# Patient Record
Sex: FEMALE | Race: BLACK OR AFRICAN AMERICAN | Employment: OTHER | URBAN - METROPOLITAN AREA
[De-identification: names, ages, dates, MRNs, and addresses within clinical notes are randomized per-mention and may not be internally consistent; named-entity substitution may affect disease eponyms.]

---

## 2017-01-23 RX ORDER — LEVOTHYROXINE SODIUM 25 UG/1
TABLET ORAL
Qty: 90 TAB | Refills: 1 | Status: SHIPPED | OUTPATIENT
Start: 2017-01-23 | End: 2017-07-28 | Stop reason: SDUPTHER

## 2017-02-14 ENCOUNTER — DOCUMENTATION ONLY (OUTPATIENT)
Dept: INTERNAL MEDICINE CLINIC | Age: 72
End: 2017-02-14

## 2017-02-14 NOTE — PROGRESS NOTES
Notification of ED visit from [facility] received. Patient seen at 2/10/17. Retrieved hospital notes from [facility] medical records portal and forwarded to BSD for review.

## 2017-02-15 ENCOUNTER — DOCUMENTATION ONLY (OUTPATIENT)
Dept: INTERNAL MEDICINE CLINIC | Age: 72
End: 2017-02-15

## 2017-02-15 NOTE — PROGRESS NOTES
Patient on the MSSP report dated 2/15/17. Admitted to [facility] 2/14/17 with AMS and aggressive behavior. Retrieved hospital [facility] from [facility] and forwarded to BSD for review.

## 2017-02-22 ENCOUNTER — TELEPHONE (OUTPATIENT)
Dept: INTERNAL MEDICINE CLINIC | Age: 72
End: 2017-02-22

## 2017-02-22 NOTE — TELEPHONE ENCOUNTER
Spoke with Mr. Chandrika Norman,    Patient was weaned off of her seizure medication. Shortly after, she had increased aggitation and ended up at [facility].  She had a seizure and stroke with right sided weakness while at [facility].  She is now back on her seizure meds and is following up with her neurologist at [facility].

## 2017-02-22 NOTE — TELEPHONE ENCOUNTER
----- Message from Won Rodriguez sent at 2/22/2017  1:01 PM EST -----  Regarding: Dr. Efrain Pino wanted to advise that his wife had a stroke a week ago, wanted to see if she needs to come in for a f/up.  Would like a callback on [phone]

## 2017-03-01 ENCOUNTER — DOCUMENTATION ONLY (OUTPATIENT)
Dept: INTERNAL MEDICINE CLINIC | Age: 72
End: 2017-03-01

## 2017-03-01 NOTE — PROGRESS NOTES
Retrieved hospital records from [facility] medical records portal and forwarded to BSD for review.

## 2017-03-10 PROBLEM — I63.9 CEREBROVASCULAR ACCIDENT (CVA) DUE TO THROMBOSIS (HCC): Status: ACTIVE | Noted: 2017-03-10

## 2017-04-20 ENCOUNTER — DOCUMENTATION ONLY (OUTPATIENT)
Dept: INTERNAL MEDICINE CLINIC | Age: 72
End: 2017-04-20

## 2017-04-20 NOTE — PROGRESS NOTES
Retrieved hospital notes from [facility] medical records portal and forwarded to BSD for review.

## 2017-05-02 NOTE — TELEPHONE ENCOUNTER
Patient needs a refill on generic Depakote 250mg, sig: take 3 tablest every 12 hours.  This medication was prescribed when she was admitted to [facility], please send script to          Pharmacy: [facility] [place], [address] [place]     Last OV:12/12/16  Next OV:06/05/17

## 2017-05-03 RX ORDER — DIVALPROEX SODIUM 250 MG/1
TABLET, DELAYED RELEASE ORAL
Refills: 2 | COMMUNITY
Start: 2017-04-05 | End: 2018-09-10 | Stop reason: SDUPTHER

## 2017-05-03 RX ORDER — DIVALPROEX SODIUM 250 MG/1
750 TABLET, DELAYED RELEASE ORAL EVERY 12 HOURS
Qty: 180 TAB | OUTPATIENT
Start: 2017-05-03

## 2017-05-03 NOTE — TELEPHONE ENCOUNTER
Last office visit - 12.12.16  Next office visit - 06.05.17      Requested Prescriptions     Pending Prescriptions Disp Refills    divalproex DR (DEPAKOTE) 250 mg tablet 180 Tab      Sig: Take 3 Tabs by mouth every twelve (12) hours.

## 2017-05-04 NOTE — TELEPHONE ENCOUNTER
Call to pt - pt was unable to identify herself and  Caretaker came onto the phone and advised pt was \"unable to talk like me & her\". She requested that I call back at 04:45pm and speak with the  Haylee Thompson. Will call back to request they contact her neurologist for further refills on this medication.

## 2017-05-05 NOTE — TELEPHONE ENCOUNTER
Call to Mr. Cyn Aguilar (/HIPPA) - advised that her Rx Depakote refills should be filled by her neurologist.  Cyn Aguilar acknowledged understanding and all questions were answered to patients satisfaction. No further questions or concerns at this time.

## 2017-06-05 ENCOUNTER — OFFICE VISIT (OUTPATIENT)
Dept: INTERNAL MEDICINE CLINIC | Age: 72
End: 2017-06-05

## 2017-06-05 VITALS
OXYGEN SATURATION: 97 % | BODY MASS INDEX: 22.48 KG/M2 | DIASTOLIC BLOOD PRESSURE: 62 MMHG | HEART RATE: 65 BPM | WEIGHT: 157 LBS | RESPIRATION RATE: 14 BRPM | SYSTOLIC BLOOD PRESSURE: 108 MMHG | TEMPERATURE: 98 F | HEIGHT: 70 IN

## 2017-06-05 DIAGNOSIS — Z79.899 ENCOUNTER FOR LONG-TERM (CURRENT) USE OF MEDICATIONS: ICD-10-CM

## 2017-06-05 DIAGNOSIS — E03.9 HYPOTHYROIDISM, ADULT: Primary | ICD-10-CM

## 2017-06-05 DIAGNOSIS — E78.2 MIXED HYPERLIPIDEMIA: ICD-10-CM

## 2017-06-05 DIAGNOSIS — G40.909 SEIZURE DISORDER (HCC): ICD-10-CM

## 2017-06-05 NOTE — PROGRESS NOTES
1. Have you been to the ER, urgent care clinic since your last visit? Hospitalized since your last visit? 3/1/2017 [facility] for ICU admit x1 week. 2. Have you seen or consulted any other health care providers outside of the [facility] since your last visit? Include any pap smears or colon screening. See above. Chief Complaint   Patient presents with    Hypothyroidism     6 month follow up    Depression     6 month follow up    Mass     Mass found on left kidney     Not fasting.

## 2017-06-05 NOTE — PROGRESS NOTES
Subjective:      Kyle Elam is a 73 y.o. female who presents today for follow up of her hypothyroid. Since her last visit, she had another stroke in 3/2017. Labs done at [facility] in 3/2017 - Tsh in normal range. She is getting OT and speech due to right arm weakness from last CVA. Her expressive dysphagia has also significantly worsened. She is still seeing her psychiatrist at [facility]. Her neurologist did a follow up  MRI of her brain and it did not show a mass that was initially thought with previous scans. She does have a mass on her left kidney that is being followed by urology at [facility]. She is going to have another CT of her kidneys in October, 2017. 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 90 Tab 1    atorvastatin (LIPITOR) 20 mg tablet Take 0.5 Tabs by mouth nightly. 30 Tab 5    sertraline (ZOLOFT) 100 mg tablet Take 150 mg by mouth daily.       clopidogrel (PLAVIX) 75 mg tablet Take 1 Tab by mouth daily. 30 Tab 1    Calcium Carbonate-Vit D3-Min (CALTRATE 600+D PLUS MINERALS) 600 mg calcium- 400 unit tab Take 1 Tab by mouth daily.  fish oil-omega-3 fatty acids 340-1,000 mg capsule Take 1 Cap by mouth daily. Review of Systems    Pertinent items are noted in HPI. Objective:     Visit Vitals    /62 (BP 1 Location: Left arm, BP Patient Position: Sitting)    Pulse 65    Temp 98 °F (36.7 °C) (Oral)    Resp 14    Ht 5' 10\" (1.778 m)    Wt 157 lb (71.2 kg)    SpO2 97%    BMI 22.53 kg/m2     General appearance: alert, cooperative, no distress, appears stated age, dyarthric  Head: Normocephalic, without obvious abnormality, atraumatic  Lungs: clear to auscultation bilaterally  Heart: regular rate and rhythm, S1, S2 normal, no murmur, click, rub or gallop    Assessment/Plan:     1. Hypothyroidism, adult  -last TSH done in 3/2017 (see scanned)  -continue levothyroxine 25mcg daily    2. Mixed hyperlipidemia  -dose of lipitor was increased from 10mg daily to 20mg daily in 3/2017 during her hospitalization for her CVA    3. Seizure disorder [address] [address]  -now on depakote. Levels and seizure followed closely by neurologist at [facility]    4. Encounter for long-term (current) use of medications    5. Left renal mass  -will be getting another renal imaging study done in October, 2017.   -followed by urology at [facility]    No orders of the defined types were placed in this encounter. Follow-up Disposition:     Follow up in 6 months     Return if symptoms worsen or fail to improve. Advised patient to call back or return to office if symptoms worsen/change/persist.     Discussed expected course/resolution/complications of diagnosis in detail with patient. Medication risks/benefits/costs/interactions/alternatives discussed with patient. Patient was given an after visit summary which includes diagnoses, current medications, & vitals.    Patient expressed understanding with the diagnosis and plan.

## 2017-06-05 NOTE — MR AVS SNAPSHOT
Visit Information Date & Time Provider Department Dept. Phone Encounter #  
 6/5/2017 10:40 AM Hannah Moore MD [place] [address] [facility] [phone] Follow-up Instructions Return in about 6 months (around 12/5/2017) for hypothyroid. Upcoming Health Maintenance Date Due DTaP/Tdap/Td series (1 - Tdap) 10/7/1968 ZOSTER VACCINE AGE 60> 10/7/2007 OSTEOPOROSIS SCREENING (DEXA) 10/7/2012 GLAUCOMA SCREENING Q2Y 10/7/2016 MEDICARE YEARLY EXAM 6/14/2017 INFLUENZA AGE 9 TO ADULT 8/1/2017 BREAST CANCER SCRN MAMMOGRAM 7/1/2018 COLONOSCOPY 1/1/2019 Allergies as of 6/5/2017  Review Complete On: 12/12/2016 By: Hannah Moore MD  
  
 Severity Noted Reaction Type Reactions Rofecoxib Medium 12/12/2016    Hives, Rash Other reaction(s): Hives, Hives Aleve [Naproxen Sodium]  12/05/2011   Side Effect Nausea and Vomiting Motrin [Ibuprofen]  12/05/2011   Side Effect Nausea and Vomiting Percocet [Oxycodone-acetaminophen]  12/05/2011   Side Effect Other (comments) Other reaction(s): Hallucinations (finding) 
hallucinations Current Immunizations  Reviewed on 12/12/2016 Name Date Influenza High Dose Vaccine PF 12/12/2016 Influenza Vaccine Intradermal PF 1/11/2013 Influenza Vaccine Whole 10/6/2009 Pneumococcal Polysaccharide (PPSV-23) 7/30/2013  8:45 AM  
  
 Not reviewed this visit Vitals BP Pulse Temp Resp Height(growth percentile) Weight(growth percentile) 108/62 (BP 1 Location: Left arm, BP Patient Position: Sitting) 65 98 °F (36.7 °C) (Oral) 14 5' 10\" (1.778 m) 157 lb (71.2 kg) SpO2 BMI OB Status Smoking Status 97% 22.53 kg/m2 Postmenopausal Never Smoker Vitals History BMI and BSA Data Body Mass Index Body Surface Area  
 22.53 kg/m 2 1.88 m 2 Preferred Pharmacy Pharmacy Name Phone [id number], [address] [address] [address] [phone] Your Updated Medication List  
  
   
This list is accurate as of: 6/5/17 11:23 AM.  Always use your most recent med list.  
  
  
  
  
 atorvastatin 20 mg tablet Commonly known as:  LIPITOR Take 0.5 Tabs by mouth nightly. CALTRATE 600+D PLUS MINERALS 600 mg calcium- 400 unit Tab Generic drug:  Calcium Carbonate-Vit D3-Min Take 1 Tab by mouth daily. clopidogrel 75 mg Tab Commonly known as:  PLAVIX Take 1 Tab by mouth daily. divalproex  mg tablet Commonly known as:  DEPAKOTE  
TAKE 3 TABLETS BY MOUTH EVERY 12 HOURS  
  
 fish oil-omega-3 fatty acids 340-1,000 mg capsule Take 1 Cap by mouth daily. levothyroxine 25 mcg tablet Commonly known as:  SYNTHROID  
TAKE 1 TAB BY MOUTH DAILY (BEFORE BREAKFAST). sertraline 100 mg tablet Commonly known as:  ZOLOFT Take 150 mg by mouth daily. Follow-up Instructions Return in about 6 months (around 12/5/2017) for hypothyroid. To-Do List   
 07/07/2017 11:00 AM  
  Appointment with [facility] DAV[facility] 1 at [address] [place] [address] ([phone]) Shower or bathe using soap and water. Do not use deodorant, powder, perfumes, or lotion the day of your exam.  If your prior mammograms were not performed at [facility] 6 please bring films with you or forward prior images 2 days before your procedure. Check in at registration 15min before your appointment time unless you were instructed to do otherwise. A script is not necessary, but if you have one, please bring it on the day of the mammogram or have it faxed to the department. [facility] [phone] [facility]  [phone] [facility] [address]  [phone] [facility] [phone] [place] [address] Kimber Gowers [phone] Introducing [facility]! [facility] introduces [employer] patient portal. Now you can access parts of your medical record, email your doctor's office, and request medication refills online.    
 
1. In your internet browser, go to [email or web address] 2. Click on the First Time User? Click Here link in the Sign In box. You will see the New Member Sign Up page. 3. Enter your 39 [employer] Access Code exactly as it appears below. You will not need to use this code after youve completed the sign-up process. If you do not sign up before the expiration date, you must request a new code. · 39 [employer] Access Code: [email or web address] Expires: 8/29/2017  9:52 AM 
 
4. Enter the last four digits of your Social Security Number (xxxx) and Date of Birth (mm/dd/yyyy) as indicated and click Submit. You will be taken to the next sign-up page. 5. Create a 39 Health ID. This will be your 39 Health login ID and cannot be changed, so think of one that is secure and easy to remember. 6. Create a 39 Health password. You can change your password at any time. 7. Enter your Password Reset Question and Answer. This can be used at a later time if you forget your password. 8. Enter your e-mail address. You will receive e-mail notification when new information is available in [address] 9. Click Sign Up. You can now view and download portions of your medical record. 10. Click the Download Summary menu link to download a portable copy of your medical information. If you have questions, please visit the Frequently Asked Questions section of the 39 [employer] website. Remember, 39 [employer] is NOT to be used for urgent needs. For medical emergencies, dial 911. Now available from your iPhone and Android! Please provide this summary of care documentation to your next provider. Your primary care clinician is listed as Nubia Lopez. If you have any questions after today's visit, please call [phone].

## 2017-07-07 ENCOUNTER — HOSPITAL ENCOUNTER (OUTPATIENT)
Dept: MAMMOGRAPHY | Age: 72
Discharge: HOME | End: 2017-07-07
Attending: INTERNAL MEDICINE
Payer: MEDICARE

## 2017-07-07 DIAGNOSIS — Z12.31 VISIT FOR SCREENING MAMMOGRAM: ICD-10-CM

## 2017-07-07 PROCEDURE — 77067 SCR MAMMO BI INCL CAD: CPT

## 2017-07-28 RX ORDER — LEVOTHYROXINE SODIUM 25 UG/1
TABLET ORAL
Qty: 90 TAB | Refills: 1 | Status: SHIPPED | OUTPATIENT
Start: 2017-07-28 | End: 2017-12-12 | Stop reason: DRUGHIGH

## 2017-09-11 ENCOUNTER — OFFICE VISIT (OUTPATIENT)
Dept: INTERNAL MEDICINE CLINIC | Age: 72
End: 2017-09-11

## 2017-09-11 VITALS
RESPIRATION RATE: 18 BRPM | HEART RATE: 76 BPM | WEIGHT: 160 LBS | BODY MASS INDEX: 22.9 KG/M2 | HEIGHT: 70 IN | OXYGEN SATURATION: 98 % | SYSTOLIC BLOOD PRESSURE: 100 MMHG | DIASTOLIC BLOOD PRESSURE: 58 MMHG | TEMPERATURE: 98.5 F

## 2017-09-11 DIAGNOSIS — Z00.00 INITIAL MEDICARE ANNUAL WELLNESS VISIT: Primary | ICD-10-CM

## 2017-09-11 NOTE — PROGRESS NOTES
Chief Complaint   Patient presents with   [facility] Annual Wellness Visit        1. Have you been to the ER, urgent care clinic since your last visit? No  Hospitalized since your last visit? No    2. Have you seen or consulted any other health care providers outside of the [address] [place] [address] since your last visit? No  Include any pap smears or colon screening.  No

## 2017-09-11 NOTE — PATIENT INSTRUCTIONS

## 2017-09-29 ENCOUNTER — TELEPHONE (OUTPATIENT)
Dept: INTERNAL MEDICINE CLINIC | Age: 72
End: 2017-09-29

## 2017-09-29 DIAGNOSIS — R47.1 DYSARTHRIA: ICD-10-CM

## 2017-09-29 DIAGNOSIS — R29.898 RIGHT HAND WEAKNESS: ICD-10-CM

## 2017-09-29 DIAGNOSIS — Z86.73 HISTORY OF STROKE: Primary | ICD-10-CM

## 2017-10-02 NOTE — TELEPHONE ENCOUNTER
Orders fax to [address] [place] [address] per pt request. Confirmation received. No further action needed.

## 2017-12-11 ENCOUNTER — HOSPITAL ENCOUNTER (OUTPATIENT)
Dept: LAB | Age: 72
Discharge: HOME | End: 2017-12-11
Payer: MEDICARE

## 2017-12-11 ENCOUNTER — OFFICE VISIT (OUTPATIENT)
Dept: INTERNAL MEDICINE CLINIC | Age: 72
End: 2017-12-11

## 2017-12-11 VITALS
DIASTOLIC BLOOD PRESSURE: 68 MMHG | HEIGHT: 70 IN | TEMPERATURE: 97.4 F | HEART RATE: 67 BPM | RESPIRATION RATE: 14 BRPM | WEIGHT: 162 LBS | SYSTOLIC BLOOD PRESSURE: 122 MMHG | OXYGEN SATURATION: 96 % | BODY MASS INDEX: 23.19 KG/M2

## 2017-12-11 DIAGNOSIS — E78.2 MIXED HYPERLIPIDEMIA: ICD-10-CM

## 2017-12-11 DIAGNOSIS — Z79.899 ENCOUNTER FOR LONG-TERM (CURRENT) USE OF MEDICATIONS: ICD-10-CM

## 2017-12-11 DIAGNOSIS — R73.09 ELEVATED GLUCOSE: ICD-10-CM

## 2017-12-11 DIAGNOSIS — R29.898 RIGHT HAND WEAKNESS: ICD-10-CM

## 2017-12-11 DIAGNOSIS — E03.9 HYPOTHYROIDISM, ADULT: Primary | ICD-10-CM

## 2017-12-11 PROCEDURE — 84439 ASSAY OF FREE THYROXINE: CPT

## 2017-12-11 PROCEDURE — 84443 ASSAY THYROID STIM HORMONE: CPT

## 2017-12-11 PROCEDURE — 80053 COMPREHEN METABOLIC PANEL: CPT

## 2017-12-11 PROCEDURE — 36415 COLL VENOUS BLD VENIPUNCTURE: CPT

## 2017-12-11 PROCEDURE — 85025 COMPLETE CBC W/AUTO DIFF WBC: CPT

## 2017-12-11 PROCEDURE — 80061 LIPID PANEL: CPT

## 2017-12-11 PROCEDURE — 83036 HEMOGLOBIN GLYCOSYLATED A1C: CPT

## 2017-12-11 RX ORDER — LEVOTHYROXINE SODIUM 25 UG/1
TABLET ORAL
COMMUNITY
Start: 2017-03-01 | End: 2017-12-11 | Stop reason: SDUPTHER

## 2017-12-11 RX ORDER — CLOPIDOGREL BISULFATE 75 MG/1
TABLET ORAL
COMMUNITY
Start: 2017-05-15 | End: 2017-12-11 | Stop reason: SDUPTHER

## 2017-12-11 RX ORDER — SERTRALINE HYDROCHLORIDE 100 MG/1
TABLET, FILM COATED ORAL
COMMUNITY
Start: 2017-05-01 | End: 2017-12-11 | Stop reason: SDUPTHER

## 2017-12-11 RX ORDER — GLUCOSAM/CHONDRO/HERB 149/HYAL 750-100 MG
TABLET ORAL
COMMUNITY
Start: 2017-03-01 | End: 2017-12-11

## 2017-12-11 NOTE — PROGRESS NOTES
Patient's identity verified with two patient identifiers (name and date of birth). 1. Have you been to the ER, urgent care clinic since your last visit? Hospitalized since your last visit? No  2. Have you seen or consulted any other health care providers outside of the [address] [place] [address] since your last visit? Include any pap smears or colon screening. No    Chief Complaint   Patient presents with    Thyroid Problem     6 month follow up    Cholesterol Problem     6 month follow up    Hand Swelling     Right hand, swelling has decreased, but still no movement. Would like to discuss- needs to see [facility]oped[facility], referral?      Not fasting. Health Maintenance Due   Topic Date Due    ZOSTER VACCINE AGE 60>  08/07/2007    GLAUCOMA SCREENING Q2Y  10/07/2016     Reviewed record in preparation for visit and have obtained necessary documentation. Wt Readings from Last 3 Encounters:   12/11/17 162 lb (73.5 kg)   09/11/17 160 lb (72.6 kg)   06/05/17 157 lb (71.2 kg)     Temp Readings from Last 3 Encounters:   12/11/17 97.4 °F (36.3 °C) (Oral)   09/11/17 98.5 °F (36.9 °C) (Oral)   06/05/17 98 °F (36.7 °C) (Oral)     BP Readings from Last 3 Encounters:   12/11/17 122/68   09/11/17 100/58   06/05/17 108/62     Pulse Readings from Last 3 Encounters:   12/11/17 67   09/11/17 76   06/05/17 65     Patient is accompanied by  I have received verbal consent from Adam Nair to discuss any/all medical information while they are present in the room.

## 2017-12-11 NOTE — MR AVS SNAPSHOT
Visit Information Date & Time Provider Department Dept. Phone Encounter #  
 12/11/2017 11:20 AM Suzan Medina MD [place] [id number] [facility] [phone] Follow-up Instructions Return in about 6 months (around 6/11/2018) for hypothyroid. Upcoming Health Maintenance Date Due  
 GLAUCOMA SCREENING Q2Y 10/7/2016 ZOSTER VACCINE AGE 60> 3/1/2018* MEDICARE YEARLY EXAM 9/12/2018 COLONOSCOPY 1/1/2019 BREAST CANCER SCRN MAMMOGRAM 7/7/2019 DTaP/Tdap/Td series (2 - Td) 9/11/2027 *Topic was postponed. The date shown is not the original due date. Allergies as of 12/11/2017  Review Complete On: 12/11/2017 By: Suzan Medina MD  
  
 Severity Noted Reaction Type Reactions Rofecoxib Medium 12/12/2016    Hives, Rash Other reaction(s): Hives, Hives Aleve [Naproxen Sodium]  12/05/2011   Side Effect Nausea and Vomiting Motrin [Ibuprofen]  12/05/2011   Side Effect Nausea and Vomiting Percocet [Oxycodone-acetaminophen]  12/05/2011   Side Effect Other (comments) Other reaction(s): Hallucinations (finding) 
hallucinations Current Immunizations  Reviewed on 12/11/2017 Name Date Influenza High Dose Vaccine PF 12/12/2016 Influenza Vaccine Intradermal PF 1/11/2013 Influenza Vaccine Whole 10/6/2009 Pneumococcal Conjugate (PCV-13) 6/20/2016 Pneumococcal Polysaccharide (PPSV-23) 7/30/2013  8:45 AM  
  
 Reviewed by Suzan Medina MD on 12/11/2017 at 11:55 AM  
You Were Diagnosed With   
  
 Codes Comments Hypothyroidism, adult    -  Primary ICD-10-CM: E03.9 ICD-9-CM: 244.9 Mixed hyperlipidemia     ICD-10-CM: E78.2 ICD-9-CM: 272.2 Right hand weakness     ICD-10-CM: R29.898 ICD-9-CM: 728.87 Elevated glucose     ICD-10-CM: R73.09 
ICD-9-CM: 790.29 Encounter for long-term (current) use of medications     ICD-10-CM: Z79.899 ICD-9-CM: V58.69 Vitals BP Pulse Temp Resp Height(growth percentile) Weight(growth percentile) 122/68 (BP 1 Location: Left arm, BP Patient Position: Sitting) 67 97.4 °F (36.3 °C) (Oral) 14 5' 10\" (1.778 m) 162 lb (73.5 kg) SpO2 BMI OB Status Smoking Status 96% 23.24 kg/m2 Postmenopausal Never Smoker Vitals History BMI and BSA Data Body Mass Index Body Surface Area  
 23.24 kg/m 2 1.91 m 2 Preferred Pharmacy Pharmacy Name Phone [id number], [address] [place] [address] [phone] Your Updated Medication List  
  
   
This list is accurate as of: 12/11/17 12:24 PM.  Always use your most recent med list.  
  
  
  
  
 atorvastatin 20 mg tablet Commonly known as:  LIPITOR  
TAKE 1 TABLET BY MOUTH EVERY DAY  
  
 CALTRATE 600+D PLUS MINERALS 600 mg calcium- 400 unit Tab Generic drug:  Calcium Carbonate-Vit D3-Min Take 1 Tab by mouth daily. clopidogrel 75 mg Tab Commonly known as:  PLAVIX Take 1 Tab by mouth daily. divalproex  mg tablet Commonly known as:  DEPAKOTE  
TAKE 3 TABLETS BY MOUTH EVERY 12 HOURS  
  
 fish oil-omega-3 fatty acids 340-1,000 mg capsule Take 1 Cap by mouth daily. levothyroxine 25 mcg tablet Commonly known as:  SYNTHROID  
TAKE 1 TAB BY MOUTH DAILY (BEFORE BREAKFAST). sertraline 100 mg tablet Commonly known as:  ZOLOFT Take 150 mg by mouth daily. We Performed the Following CBC WITH AUTOMATED DIFF [47757 CPT(R)] HEMOGLOBIN A1C WITH EAG [62470 CPT(R)] LIPID PANEL [70239 CPT(R)] METABOLIC PANEL, COMPREHENSIVE [52754 CPT(R)] T4, FREE A847768 CPT(R)] TSH 3RD GENERATION [20742 CPT(R)] Follow-up Instructions Return in about 6 months (around 6/11/2018) for hypothyroid. Introducing [facility]! [facility] introduces eBrisk Video patient portal. Now you can access parts of your medical record, email your doctor's office, and request medication refills online.    
 
1. In your internet browser, go to [email or web address] 2. Click on the First Time User? Click Here link in the Sign In box. You will see the New Member Sign Up page. 3. Enter your Hotalot Access Code exactly as it appears below. You will not need to use this code after youve completed the sign-up process. If you do not sign up before the expiration date, you must request a new code. · Hotalot Access Code: [email or web address] Expires: 3/11/2018 12:24 PM 
 
4. Enter the last four digits of your Social Security Number (xxxx) and Date of Birth (mm/dd/yyyy) as indicated and click Submit. You will be taken to the next sign-up page. 5. Create a Freight Connectiont ID. This will be your Hotalot login ID and cannot be changed, so think of one that is secure and easy to remember. 6. Create a Hotalot password. You can change your password at any time. 7. Enter your Password Reset Question and Answer. This can be used at a later time if you forget your password. 8. Enter your e-mail address. You will receive e-mail notification when new information is available in [address] 9. Click Sign Up. You can now view and download portions of your medical record. 10. Click the Download Summary menu link to download a portable copy of your medical information. If you have questions, please visit the Frequently Asked Questions section of the Hotalot website. Remember, Hotalot is NOT to be used for urgent needs. For medical emergencies, dial 911. Now available from your iPhone and Android! Please provide this summary of care documentation to your next provider. Your primary care clinician is listed as Nubia Lopez. If you have any questions after today's visit, please call [phone].

## 2017-12-11 NOTE — PROGRESS NOTES
Subjective:      Chelle Veloz is a 81 y.o. female who presents today for follow up of her hypothyroid and hyperlipidemia. She is here with her . Since her last CVA, in 3/2017, she has had significant weakness and swelling in her right hand. She has worked with PT and OT. The swelling in her hand has resolved, but the mobility is still quite limited. Her dysarthria also worsened after this second stroke. She has also not regained much with speech therapy. Her  has hired someone to come twice weekly to help her with her speech and her mobility. She has been discharged by OT and PT.  OT suggested perhaps she can talk with orthopedics to see if they can do anything else with her hand. She is right handed. Patient is tearful today. Frustrated about not making progress with her speech and hand movement. She follows with neurology and psychiatry at [facility].     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    S/P arthroscopy of shoulder Z98.890    Colon cancer screening Z12.11    History of screening mammography Z92.89    Pap smear for cervical cancer screening Z12.4    S/P left cataract extraction Z98.42    Vitamin D deficiency E55.9    CVA (cerebral infarction) I63.9    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    Pseudobulbar affect F48.2    Right arm weakness R29.898    Seizure ([address] R56.9    Toxoplasma chorioretinitis, 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 90 Tab 1    divalproex DR (DEPAKOTE) 250 mg tablet TAKE 3 TABLETS BY MOUTH EVERY 12 HOURS  2    sertraline (ZOLOFT) 100 mg tablet Take 150 mg by mouth daily.  clopidogrel (PLAVIX) 75 mg tablet Take 1 Tab by mouth daily. 30 Tab 1    fish oil-omega-3 fatty acids 340-1,000 mg capsule Take 1 Cap by mouth daily.  Calcium Carbonate-Vit D3-Min (CALTRATE 600+D PLUS MINERALS) 600 mg calcium- 400 unit tab Take 1 Tab by mouth daily. Review of Systems    Pertinent items are noted in HPI. Objective:     Visit Vitals    /68 (BP 1 Location: Left arm, BP Patient Position: Sitting)    Pulse 67    Temp 97.4 °F (36.3 °C) (Oral)    Resp 14    Ht 5' 10\" (1.778 m)    Wt 162 lb (73.5 kg)    SpO2 96%    BMI 23.24 kg/m2     General appearance: alert, cooperative, no distress, appears stated age  Head: Normocephalic, without obvious abnormality, atraumatic  Neck: supple, symmetrical, trachea midline, no adenopathy, thyroid: not enlarged, symmetric, no tenderness/mass/nodules, no carotid bruit and no JVD  Lungs: clear to auscultation bilaterally  Heart: regular rate and rhythm, S1, S2 normal, no murmur, click, rub or gallop    Assessment/Plan:     1. Hypothyroidism, adult  - clinically euthyroid. Due for labs at this time.     - TSH 3RD GENERATION  - T4, FREE    2. Mixed hyperlipidemia  -taking statin with history of CVA. -due for fasting lipid panel at this time. - CBC WITH AUTOMATED DIFF  - METABOLIC PANEL, COMPREHENSIVE  - LIPID PANEL    3. Right hand weakness  -referred to ortho at request of patient. - Dr. Yung Sousa    4. Elevated glucose  -noted to have elevated glucose in past labs. Will recheck at this time.     - HEMOGLOBIN A1C WITH EAG    5.  Encounter for long-term (current) use of medications    Orders Placed This Encounter    CBC WITH AUTOMATED DIFF    METABOLIC PANEL, COMPREHENSIVE    LIPID PANEL    TSH 3RD GENERATION    T4, FREE    HEMOGLOBIN A1C WITH EAG    REFERRAL TO ORTHOPEDIC SURGERY     Referral Priority:   Routine     Referral Type:   Consultation     Referral Reason:   Specialty Services Required     Referred to Provider:   Sam Barrett MD     Requested Specialty:   Orthopedic Surgery     Follow-up Disposition:     Follow up in 6 months     Return if symptoms worsen or fail to improve. Advised patient to call back or return to office if symptoms worsen/change/persist.     Discussed expected course/resolution/complications of diagnosis in detail with patient. Medication risks/benefits/costs/interactions/alternatives discussed with patient. Patient was given an after visit summary which includes diagnoses, current medications, & vitals. Patient expressed understanding with the diagnosis and plan.

## 2017-12-12 ENCOUNTER — TELEPHONE (OUTPATIENT)
Dept: INTERNAL MEDICINE CLINIC | Age: 72
End: 2017-12-12

## 2017-12-12 LAB
ALBUMIN SERPL-MCNC: 4.3 G/DL (ref 3.5–4.8)
ALBUMIN/GLOB SERPL: 1.5 {RATIO} (ref 1.2–2.2)
ALP SERPL-CCNC: 53 IU/L (ref 39–117)
ALT SERPL-CCNC: 5 IU/L (ref 0–32)
AST SERPL-CCNC: 12 IU/L (ref 0–40)
BASOPHILS # BLD AUTO: 0 X10E3/UL (ref 0–0.2)
BASOPHILS NFR BLD AUTO: 0 %
BILIRUB SERPL-MCNC: 0.3 MG/DL (ref 0–1.2)
BUN SERPL-MCNC: 21 MG/DL (ref 8–27)
BUN/CREAT SERPL: 21 (ref 12–28)
CALCIUM SERPL-MCNC: 9.3 MG/DL (ref 8.7–10.3)
CHLORIDE SERPL-SCNC: 102 MMOL/L (ref 96–106)
CHOLEST SERPL-MCNC: 119 MG/DL (ref 100–199)
CO2 SERPL-SCNC: 25 MMOL/L (ref 18–29)
CREAT SERPL-MCNC: 1.02 MG/DL (ref 0.57–1)
EOSINOPHIL # BLD AUTO: 0 X10E3/UL (ref 0–0.4)
EOSINOPHIL NFR BLD AUTO: 0 %
ERYTHROCYTE [DISTWIDTH] IN BLOOD BY AUTOMATED COUNT: 15.4 % (ref 12.3–15.4)
EST. AVERAGE GLUCOSE BLD GHB EST-MCNC: 120 MG/DL
GFR SERPLBLD CREATININE-BSD FMLA CKD-EPI: 56 ML/MIN/1.73
GFR SERPLBLD CREATININE-BSD FMLA CKD-EPI: 64 ML/MIN/1.73
GLOBULIN SER CALC-MCNC: 2.9 G/DL (ref 1.5–4.5)
GLUCOSE SERPL-MCNC: 106 MG/DL (ref 65–99)
HBA1C MFR BLD: 5.8 % (ref 4.8–5.6)
HCT VFR BLD AUTO: 36.4 % (ref 34–46.6)
HDLC SERPL-MCNC: 59 MG/DL
HGB BLD-MCNC: 12.2 G/DL (ref 11.1–15.9)
IMM GRANULOCYTES # BLD: 0 X10E3/UL (ref 0–0.1)
IMM GRANULOCYTES NFR BLD: 0 %
INTERPRETATION, 910389: NORMAL
INTERPRETATION: NORMAL
LDLC SERPL CALC-MCNC: 46 MG/DL (ref 0–99)
LYMPHOCYTES # BLD AUTO: 1.4 X10E3/UL (ref 0.7–3.1)
LYMPHOCYTES NFR BLD AUTO: 27 %
MCH RBC QN AUTO: 28.6 PG (ref 26.6–33)
MCHC RBC AUTO-ENTMCNC: 33.5 G/DL (ref 31.5–35.7)
MCV RBC AUTO: 85 FL (ref 79–97)
MONOCYTES # BLD AUTO: 0.6 X10E3/UL (ref 0.1–0.9)
MONOCYTES NFR BLD AUTO: 12 %
NEUTROPHILS # BLD AUTO: 3.2 X10E3/UL (ref 1.4–7)
NEUTROPHILS NFR BLD AUTO: 61 %
PDF IMAGE, 910387: NORMAL
PLATELET # BLD AUTO: 129 X10E3/UL (ref 150–379)
POTASSIUM SERPL-SCNC: 4.3 MMOL/L (ref 3.5–5.2)
PROT SERPL-MCNC: 7.2 G/DL (ref 6–8.5)
RBC # BLD AUTO: 4.27 X10E6/UL (ref 3.77–5.28)
SODIUM SERPL-SCNC: 141 MMOL/L (ref 134–144)
T4 FREE SERPL-MCNC: 1.03 NG/DL (ref 0.82–1.77)
TRIGL SERPL-MCNC: 68 MG/DL (ref 0–149)
TSH SERPL DL<=0.005 MIU/L-ACNC: 5.56 UIU/ML (ref 0.45–4.5)
VLDLC SERPL CALC-MCNC: 14 MG/DL (ref 5–40)
WBC # BLD AUTO: 5.2 X10E3/UL (ref 3.4–10.8)

## 2017-12-12 RX ORDER — LEVOTHYROXINE SODIUM 50 UG/1
50 TABLET ORAL
Qty: 90 TAB | Refills: 1 | Status: SHIPPED | OUTPATIENT
Start: 2017-12-12 | End: 2018-06-09 | Stop reason: SDUPTHER

## 2017-12-12 NOTE — PROGRESS NOTES
See telephone note 12/12/2017. Increase levothyroxine to 50mcg daily.   Recheck TSH at her next visit

## 2017-12-12 NOTE — TELEPHONE ENCOUNTER
Discussed with her  that her TSH is elevated on her current dose of levothyroxine of 25mcg daily. Plan - increase to levothyroxine 50mcg daily . Recheck her TSH at her next visit.

## 2018-03-12 RX ORDER — ATORVASTATIN CALCIUM 20 MG/1
TABLET, FILM COATED ORAL
Qty: 90 TAB | Refills: 1 | Status: SHIPPED | OUTPATIENT
Start: 2018-03-12 | End: 2018-09-09 | Stop reason: SDUPTHER

## 2018-06-10 RX ORDER — LEVOTHYROXINE SODIUM 50 UG/1
TABLET ORAL
Qty: 90 TAB | Refills: 1 | Status: SHIPPED | OUTPATIENT
Start: 2018-06-10 | End: 2018-12-05 | Stop reason: SDUPTHER

## 2018-06-25 ENCOUNTER — TELEPHONE (OUTPATIENT)
Dept: INTERNAL MEDICINE CLINIC | Age: 73
End: 2018-06-25

## 2018-06-25 NOTE — TELEPHONE ENCOUNTER
Anyone in the Neuro practice at [facility] Loras Leyden is one in that group)

## 2018-06-25 NOTE — TELEPHONE ENCOUNTER
HIPAA verified. Spoke with patient's , two identifiers verified. Advised per DSE, NP of recommended Neuro office & provider. States he will look up this online. Advised to return call w/ questions/concerns. Patient's  verbalizes understanding.

## 2018-06-25 NOTE — TELEPHONE ENCOUNTER
states they are dissatisfied with the neurologist his wife is seeing at [facility], he would like Dr. Cristiana Nolasco to refer her to a neurologist in the [place] [address] (he was advised that Dr. Cristiana Nolasco is out of the office this week).

## 2018-07-06 ENCOUNTER — HOSPITAL ENCOUNTER (OUTPATIENT)
Dept: LAB | Age: 73
Discharge: HOME | End: 2018-07-06
Payer: MEDICARE

## 2018-07-06 ENCOUNTER — OFFICE VISIT (OUTPATIENT)
Dept: INTERNAL MEDICINE CLINIC | Age: 73
End: 2018-07-06

## 2018-07-06 VITALS
WEIGHT: 161 LBS | RESPIRATION RATE: 14 BRPM | DIASTOLIC BLOOD PRESSURE: 62 MMHG | TEMPERATURE: 97 F | BODY MASS INDEX: 23.05 KG/M2 | HEART RATE: 69 BPM | SYSTOLIC BLOOD PRESSURE: 124 MMHG | HEIGHT: 70 IN | OXYGEN SATURATION: 96 %

## 2018-07-06 DIAGNOSIS — I69.322 CVA, OLD, DYSARTHRIA: ICD-10-CM

## 2018-07-06 DIAGNOSIS — R10.13 DYSPEPSIA: ICD-10-CM

## 2018-07-06 DIAGNOSIS — E03.9 HYPOTHYROIDISM, ADULT: Primary | ICD-10-CM

## 2018-07-06 DIAGNOSIS — R29.898 RIGHT HAND WEAKNESS: ICD-10-CM

## 2018-07-06 DIAGNOSIS — R73.09 ELEVATED GLUCOSE: ICD-10-CM

## 2018-07-06 DIAGNOSIS — Z79.899 ENCOUNTER FOR LONG-TERM (CURRENT) USE OF MEDICATIONS: ICD-10-CM

## 2018-07-06 PROCEDURE — 84439 ASSAY OF FREE THYROXINE: CPT

## 2018-07-06 PROCEDURE — 36415 COLL VENOUS BLD VENIPUNCTURE: CPT

## 2018-07-06 PROCEDURE — 80053 COMPREHEN METABOLIC PANEL: CPT

## 2018-07-06 PROCEDURE — 83036 HEMOGLOBIN GLYCOSYLATED A1C: CPT

## 2018-07-06 PROCEDURE — 84443 ASSAY THYROID STIM HORMONE: CPT

## 2018-07-06 RX ORDER — SERTRALINE HYDROCHLORIDE 100 MG/1
TABLET, FILM COATED ORAL
COMMUNITY
Start: 2017-12-11 | End: 2018-07-06 | Stop reason: SDUPTHER

## 2018-07-06 RX ORDER — PNEUMOCOCCAL VACCINE POLYVALENT 25; 25; 25; 25; 25; 25; 25; 25; 25; 25; 25; 25; 25; 25; 25; 25; 25; 25; 25; 25; 25; 25; 25 UG/.5ML; UG/.5ML; UG/.5ML; UG/.5ML; UG/.5ML; UG/.5ML; UG/.5ML; UG/.5ML; UG/.5ML; UG/.5ML; UG/.5ML; UG/.5ML; UG/.5ML; UG/.5ML; UG/.5ML; UG/.5ML; UG/.5ML; UG/.5ML; UG/.5ML; UG/.5ML; UG/.5ML; UG/.5ML; UG/.5ML
INJECTION, SOLUTION INTRAMUSCULAR; SUBCUTANEOUS
Refills: 0 | COMMUNITY
Start: 2018-06-18 | End: 2018-07-06 | Stop reason: ALTCHOICE

## 2018-07-06 RX ORDER — GLUCOSAM/CHONDRO/HERB 149/HYAL 750-100 MG
TABLET ORAL
COMMUNITY
Start: 2017-03-01 | End: 2018-07-06 | Stop reason: SDUPTHER

## 2018-07-06 RX ORDER — LEVOTHYROXINE SODIUM 25 UG/1
TABLET ORAL
COMMUNITY
Start: 2017-03-01 | End: 2018-07-06 | Stop reason: SDUPTHER

## 2018-07-06 RX ORDER — CLOPIDOGREL BISULFATE 75 MG/1
TABLET ORAL
COMMUNITY
Start: 2017-07-14 | End: 2018-07-06 | Stop reason: SDUPTHER

## 2018-07-06 NOTE — PROGRESS NOTES
Subjective:      Madalyn Brody is a 81 y.o. female who presents today for follow up of her hypothyroid and elevated glucose. She is here with her  who is her primary care giver. She follows with neurology and psychiatry at [facility] for her hx of seizure, CVA and pseudobulbar affect. She brings today the exercises she does at home for the stiffness and loss of function of her right hand which occurred with her last stroke in 3/2017. She is working hard with her dysarthria. She is using 4th grade work books to work on reading comprehension and also her verbal skills. She has some left chest discomfort when she eats peppers. Otherwise, her appetite is good. She is also walking daily for 15 minutes to help her balance. 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    S/P arthroscopy of shoulder Z98.890    Colon cancer screening Z12.11    History of screening mammography Z92.89    Pap smear for cervical cancer screening Z12.4    S/P left cataract extraction Z98.42    Vitamin D deficiency E55.9    CVA (cerebral infarction) I63.9    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    Pseudobulbar affect F48.2    Right arm weakness R29.898    Seizure ([address] R56.9    Toxoplasma chorioretinitis, 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 90 Tab 1    divalproex DR (DEPAKOTE) 250 mg tablet TAKE 3 TABLETS BY MOUTH EVERY 12 HOURS  2    sertraline (ZOLOFT) 100 mg tablet Take 150 mg by mouth daily.       clopidogrel (PLAVIX) 75 mg tablet Take 1 Tab by mouth daily. 30 Tab 1    fish oil-omega-3 fatty acids 340-1,000 mg capsule Take 1 Cap by mouth daily.  Calcium Carbonate-Vit D3-Min (CALTRATE 600+D PLUS MINERALS) 600 mg calcium- 400 unit tab Take 1 Tab by mouth daily.  atorvastatin (LIPITOR) 20 mg tablet TAKE 1 TABLET BY MOUTH EVERY DAY 90 Tab 1        Review of Systems    Pertinent items are noted in HPI. Objective:     Visit Vitals    /62 (BP 1 Location: Left arm, BP Patient Position: Sitting)    Pulse 69    Temp 97 °F (36.1 °C) (Oral)    Resp 14    Ht 5' 10\" (1.778 m)    Wt 161 lb (73 kg)    SpO2 96%    BMI 23.1 kg/m2     General appearance: alert, cooperative, no distress, appears stated age  Head: Normocephalic, without obvious abnormality, atraumatic  Neck: supple, symmetrical, trachea midline, no adenopathy, thyroid: not enlarged, symmetric, no tenderness/mass/nodules, no carotid bruit and no JVD  Lungs: clear to auscultation bilaterally  Heart: regular rate and rhythm, S1, S2 normal, no murmur, click, rub or gallop  Extremities: no edema. Right hand- fingers slightly contracted. Patient able to move her hand at wrist slightly and slight movements of her fingers. Pulses: 2+ and symmetric    Assessment/Plan:     1. Hypothyroidism, adult  -appears clinically euthyroid  -check labs at this time.     - METABOLIC PANEL, COMPREHENSIVE  - TSH 3RD GENERATION  - T4, FREE    2. Elevated glucose  -continue low sugar and carbohydrate diet. - METABOLIC PANEL, COMPREHENSIVE  - HEMOGLOBIN A1C WITH EAG    3. Encounter for long-term (current) use of medications      4. CVA, old, dysarthria  -patient studying on her own for her dysarthria. May benefit from speech therapy.     - REFERRAL TO PHYSICAL THERAPY    5. Right hand weakness  -has been working hard at doing her exercises on her own. Will consult PT to advance her exercises. - REFERRAL TO PHYSICAL THERAPY    6. Dyspepsia  -occurs only with consumption of peppers.   Patient to avoid this food. Orders Placed This Encounter    METABOLIC PANEL, COMPREHENSIVE    TSH 3RD GENERATION    T4, FREE    HEMOGLOBIN A1C WITH EAG    CKD REPORT    REFERRAL TO PHYSICAL THERAPY     Referral Priority:   Routine     Referral Type:   PT/OT/ST     Referral Reason:   Specialty Services Required                                                      varicella-zoster recombinant, PF, (SHINGRIX) 50 mcg/0.5 mL susr injection     Si.5 mL by IntraMUSCular route once for 1 dose. Repeat in 2-4 months     Dispense:  0.5 mL     Refill:  1         Follow-up Disposition:     Follow up in 6 months     Return if symptoms worsen or fail to improve. Advised patient to call back or return to office if symptoms worsen/change/persist.     Discussed expected course/resolution/complications of diagnosis in detail with patient. Medication risks/benefits/costs/interactions/alternatives discussed with patient. Patient was given an after visit summary which includes diagnoses, current medications, & vitals. Patient expressed understanding with the diagnosis and plan.

## 2018-07-06 NOTE — MR AVS SNAPSHOT
[address] [place] [address], [facility] [address] [place] [address] 
[phone] Patient: Chelle Veloz MRN: [id number] :1945 Visit Information Date & Time Provider Department Dept. Phone Encounter #  
 2018 11:20 AM Andreia Hines MD [place] [id number] [facility] [phone] Follow-up Instructions Return in about 6 months (around 2019) for hypothyroid. Upcoming Health Maintenance Date Due ZOSTER VACCINE AGE 60> 2007 GLAUCOMA SCREENING Q2Y 10/7/2016 COLONOSCOPY 2019 Influenza Age 5 to Adult 2018 MEDICARE YEARLY EXAM 2018 BREAST CANCER SCRN MAMMOGRAM 2019 DTaP/Tdap/Td series (2 - Td) 2027 Allergies as of 2018  Review Complete On: 2018 By: Andreia Hines MD  
  
 Severity Noted Reaction Type Reactions Rofecoxib Medium 2016    Hives, Rash Other reaction(s): Hives, Hives Aleve [Naproxen Sodium]  2011   Side Effect Nausea and Vomiting Motrin [Ibuprofen]  2011   Side Effect Nausea and Vomiting Percocet [Oxycodone-acetaminophen]  2011   Side Effect Other (comments) Other reaction(s): Hallucinations (finding) 
hallucinations Current Immunizations  Reviewed on 2018 Name Date Influenza High Dose Vaccine PF 2018 12:00 AM, 2016 Influenza Vaccine Intradermal PF 2013 Influenza Vaccine Whole 10/6/2009 Pneumococcal Conjugate (PCV-13) 2016 Pneumococcal Polysaccharide (PPSV-23) 2018 12:00 AM, 2013  8:45 AM  
  
 Reviewed by Andreia Hines MD on 2018 at 11:32 AM  
You Were Diagnosed With   
  
 Codes Comments Hypothyroidism, adult    -  Primary ICD-10-CM: E03.9 ICD-9-CM: 244.9 Elevated glucose     ICD-10-CM: R73.09 
ICD-9-CM: 790.29 Encounter for long-term (current) use of medications     ICD-10-CM: Z79.899 ICD-9-CM: V58.65   
 CVA, old, dysarthria     ICD-10-CM: N51.869 
 ICD-9-CM: 438.13 Right hand weakness     ICD-10-CM: R29.898 ICD-9-CM: 728.87 Vitals BP Pulse Temp Resp Height(growth percentile) Weight(growth percentile) 124/62 (BP 1 Location: Left arm, BP Patient Position: Sitting) 69 97 °F (36.1 °C) (Oral) 14 5' 10\" (1.778 m) 161 lb (73 kg) SpO2 BMI OB Status Smoking Status 96% 23.1 kg/m2 Postmenopausal Never Smoker Vitals History BMI and BSA Data Body Mass Index Body Surface Area  
 23.1 kg/m 2 1.9 m 2 Preferred Pharmacy Pharmacy Name Phone [id number], [place] [address] [place] [address] [phone] Your Updated Medication List  
  
   
This list is accurate as of 18 11:59 AM.  Always use your most recent med list.  
  
  
  
  
 atorvastatin 20 mg tablet Commonly known as:  LIPITOR  
TAKE 1 TABLET BY MOUTH EVERY DAY  
  
 CALTRATE 600+D PLUS MINERALS 600 mg calcium- 400 unit Tab Generic drug:  Calcium Carbonate-Vit D3-Min Take 1 Tab by mouth daily. clopidogrel 75 mg Tab Commonly known as:  PLAVIX Take 1 Tab by mouth daily. divalproex  mg tablet Commonly known as:  DEPAKOTE  
TAKE 3 TABLETS BY MOUTH EVERY 12 HOURS  
  
 fish oil-omega-3 fatty acids 340-1,000 mg capsule Take 1 Cap by mouth daily. levothyroxine 50 mcg tablet Commonly known as:  SYNTHROID  
TAKE 1 TAB BY MOUTH DAILY (BEFORE BREAKFAST). sertraline 100 mg tablet Commonly known as:  ZOLOFT Take 150 mg by mouth daily. varicella-zoster recombinant (PF) 50 mcg/0.5 mL Susr injection Commonly known as:  SHINGRIX  
0.5 mL by IntraMUSCular route once for 1 dose. Repeat in 2-4 months Prescriptions Printed Refills  
 varicella-zoster recombinant, PF, (SHINGRIX) 50 mcg/0.5 mL susr injection 1 Si.5 mL by IntraMUSCular route once for 1 dose. Repeat in 2-4 months Class: Print Route: IntraMUSCular We Performed the Following HEMOGLOBIN A1C WITH EAG [06806 CPT(R)] METABOLIC PANEL, COMPREHENSIVE [98812 CPT(R)] REFERRAL TO PHYSICAL THERAPY [WPT52 Custom] T4, FREE [id number] CPT(R)] TSH 3RD GENERATION [13198 CPT(R)] Follow-up Instructions Return in about 6 months (around 1/6/2019) for hypothyroid. To-Do List   
 07/20/2018 2:45 PM  
  Appointment with [facility] DAV[facility] [address] at [address] [place] [address] ([phone]) Shower or bathe using soap and water. Do not use deodorant, powder, perfumes, or lotion the day of your exam.  If your prior mammograms were not performed at [facility] 6 please bring films with you or forward prior images 2 days before your procedure. Check in at registration 15min before your appointment time unless you were instructed to do otherwise. A script is not necessary, but if you have one, please bring it on the day of the mammogram or have it faxed to the department. You are responsible for finding a method of transportation to your appointment. If you don't have transportation, please reschedule your appointment at least 24 hours in advance. [facility] [phone] [facility]  [phone] [facility] [address]  [phone] [facility] [phone] [place] [phone] Referral Information Referral ID Referred By Referred To  
  
 [id number] SHALINI [facility] Not Available Visits Status Start Date End Date 1 New Request 7/6/18 7/6/19 If your referral has a status of pending review or denied, additional information will be sent to support the outcome of this decision. Introducing [facility]! [address] introduces [facility] patient portal. Now you can access parts of your medical record, email your doctor's office, and request medication refills online. 1. In your internet browser, go to [email or web address] 2. Click on the First Time User? Click Here link in the Sign In box. You will see the New Member Sign Up page. 3. Enter your PhotoRocket Access Code exactly as it appears below. You will not need to use this code after youve completed the sign-up process. If you do not sign up before the expiration date, you must request a new code. · PhotoRocket Access Code: [email or web address] Expires: 9/16/2018  9:40 AM 
 
4. Enter the last four digits of your Social Security Number (xxxx) and Date of Birth (mm/dd/yyyy) as indicated and click Submit. You will be taken to the next sign-up page. 5. Create a PhotoRocket ID. This will be your PhotoRocket login ID and cannot be changed, so think of one that is secure and easy to remember. 6. Create a PhotoRocket password. You can change your password at any time. 7. Enter your Password Reset Question and Answer. This can be used at a later time if you forget your password. 8. Enter your e-mail address. You will receive e-mail notification when new information is available in [address] 9. Click Sign Up. You can now view and download portions of your medical record. 10. Click the Download Summary menu link to download a portable copy of your medical information. If you have questions, please visit the Frequently Asked Questions section of the PhotoRocket website. Remember, PhotoRocket is NOT to be used for urgent needs. For medical emergencies, dial 911. Now available from your iPhone and Android! Please provide this summary of care documentation to your next provider. Your primary care clinician is listed as Nubia Lopez. If you have any questions after today's visit, please call [phone].

## 2018-07-06 NOTE — PROGRESS NOTES
Patient's identity verified with two patient identifiers (name and date of birth). Reviewed record in preparation for visit and have obtained necessary documentation. 1. Have you been to the ER, urgent care clinic since your last visit? Hospitalized since your last visit? No  2. Have you seen or consulted any other health care providers outside of the [address] [place] [address] since your last visit? Include any pap smears or colon screening. No    Chief Complaint   Patient presents with    Cholesterol Problem     6 month follow up    Hypertension     6 month follow up    Chest Pain     Midline, intermittent, none today. Patient is able to pinpoint pain/discomfort, size of a quarter, Left side of chest/breast.  Worse w/ pepper intake, thinks it is indigestion. Would like to discuss. Denies SOB/difficulty breathing. Not fasting. Health Maintenance Due   Topic Date Due    ZOSTER VACCINE AGE 60>   Has not had. 08/07/2007    GLAUCOMA SCREENING Q2Y   Has not had.  10/07/2016    COLONOSCOPY   Has not had. 01/01/2019       Wt Readings from Last 3 Encounters:   07/06/18 161 lb (73 kg)   12/11/17 162 lb (73.5 kg)   09/11/17 160 lb (72.6 kg)     Temp Readings from Last 3 Encounters:   07/06/18 97 °F (36.1 °C) (Oral)   12/11/17 97.4 °F (36.3 °C) (Oral)   09/11/17 98.5 °F (36.9 °C) (Oral)     BP Readings from Last 3 Encounters:   07/06/18 124/62   12/11/17 122/68   09/11/17 100/58     Pulse Readings from Last 3 Encounters:   07/06/18 69   12/11/17 67   09/11/17 76       Learning Assessment:  :     Learning Assessment 7/6/2018 8/4/2015 4/7/2014   PRIMARY LEARNER Patient Patient Patient   HIGHEST LEVEL OF EDUCATION - PRIMARY LEARNER  - > 4 YEARS OF COLLEGE > 4 YEARS OF COLLEGE   BARRIERS PRIMARY LEARNER COGNITIVE COGNITIVE NONE   CO-LEARNER CAREGIVER Yes Yes No   CO-LEARNER NAME  Patrick Jenkins [id number] - > 4 YEARS OF COLLEGE -   BARRIERS CO-LEARNER - NONE -   PRIMARY LANGUAGE ENGLISH ENGLISH ENGLISH   PRIMARY LANGUAGE CO-LEARNER - ENGLISH -    NEED - No No   LEARNER PREFERENCE PRIMARY DEMONSTRATION LISTENING OTHER (COMMENT)   LEARNER PREFERENCE CO-LEARNER - OTHER (COMMENT) -   LEARNING SPECIAL TOPICS - no no   ANSWERED BY patient patient patient   RELATIONSHIP SELF SELF SELF       Fall Risk Assessment:  :     Fall Risk Assessment, last 12 mths 7/6/2018   Able to walk? Yes   Fall in past 12 months? No       Abuse Screening:  :     Abuse Screening Questionnaire 7/6/2018 9/23/2015 8/4/2015 4/7/2014   Do you ever feel afraid of your partner? N (No Data) (No Data) N   Are you in a relationship with someone who physically or mentally threatens you? N (No Data) (No Data) N   Is it safe for you to go home? Y (No Data) (No Data) Y     Patient is accompanied by  I have received verbal consent from Kwesi Myles to discuss any/all medical information while they are present in the room.

## 2018-07-07 LAB
ALBUMIN SERPL-MCNC: 4.5 G/DL (ref 3.5–4.8)
ALBUMIN/GLOB SERPL: 1.6 {RATIO} (ref 1.2–2.2)
ALP SERPL-CCNC: 58 IU/L (ref 39–117)
ALT SERPL-CCNC: 9 IU/L (ref 0–32)
AST SERPL-CCNC: 10 IU/L (ref 0–40)
BILIRUB SERPL-MCNC: 0.3 MG/DL (ref 0–1.2)
BUN SERPL-MCNC: 19 MG/DL (ref 8–27)
BUN/CREAT SERPL: 16 (ref 12–28)
CALCIUM SERPL-MCNC: 9.5 MG/DL (ref 8.7–10.3)
CHLORIDE SERPL-SCNC: 107 MMOL/L (ref 96–106)
CO2 SERPL-SCNC: 26 MMOL/L (ref 20–29)
CREAT SERPL-MCNC: 1.2 MG/DL (ref 0.57–1)
EST. AVERAGE GLUCOSE BLD GHB EST-MCNC: 126 MG/DL
GLOBULIN SER CALC-MCNC: 2.9 G/DL (ref 1.5–4.5)
GLUCOSE SERPL-MCNC: 107 MG/DL (ref 65–99)
HBA1C MFR BLD: 6 % (ref 4.8–5.6)
INTERPRETATION: NORMAL
POTASSIUM SERPL-SCNC: 3.8 MMOL/L (ref 3.5–5.2)
PROT SERPL-MCNC: 7.4 G/DL (ref 6–8.5)
SODIUM SERPL-SCNC: 146 MMOL/L (ref 134–144)
T4 FREE SERPL-MCNC: 1.24 NG/DL (ref 0.82–1.77)
TSH SERPL DL<=0.005 MIU/L-ACNC: 3.92 UIU/ML (ref 0.45–4.5)

## 2018-07-20 ENCOUNTER — HOSPITAL ENCOUNTER (OUTPATIENT)
Dept: MAMMOGRAPHY | Age: 73
Discharge: HOME | End: 2018-07-20
Attending: INTERNAL MEDICINE
Payer: MEDICARE

## 2018-07-20 DIAGNOSIS — Z12.39 SCREENING BREAST EXAMINATION: ICD-10-CM

## 2018-07-20 PROCEDURE — 77067 SCR MAMMO BI INCL CAD: CPT

## 2018-07-30 ENCOUNTER — OFFICE VISIT (OUTPATIENT)
Dept: NEUROLOGY | Age: 73
End: 2018-07-30

## 2018-07-30 VITALS
WEIGHT: 160 LBS | RESPIRATION RATE: 18 BRPM | SYSTOLIC BLOOD PRESSURE: 116 MMHG | OXYGEN SATURATION: 98 % | DIASTOLIC BLOOD PRESSURE: 74 MMHG | BODY MASS INDEX: 22.96 KG/M2 | HEART RATE: 65 BPM

## 2018-07-30 DIAGNOSIS — G40.209 PARTIAL SYMPTOMATIC EPILEPSY WITH COMPLEX PARTIAL SEIZURES, NOT INTRACTABLE, WITHOUT STATUS EPILEPTICUS (HCC): ICD-10-CM

## 2018-07-30 DIAGNOSIS — I65.22 CAROTID STENOSIS, LEFT: ICD-10-CM

## 2018-07-30 DIAGNOSIS — I69.30 CHRONIC ISCHEMIC LEFT MCA STROKE: Primary | ICD-10-CM

## 2018-07-30 NOTE — PROGRESS NOTES
Chief Complaint   Patient presents with    Extremity Weakness       Referred by: Dr. Bhumika King      HPI    Mrs. Karina Morse is a 27-year-old woman with history of stroke here to establish care. She is a new patient to me. She has a complicated neurologic history consisting of left hemispheric infarcts, ICH, epilepsy. Her  is here who provided the collateral history. The patient has a motor aphasia and cannot communicate very well.  tells me that she has had 2 strokes beginning in 2014 and then again in 2017. This conflicts somewhat with the records I have. He tells me the first stroke in 2014 that developed into a subsequent ICH. She currently takes Plavix 75 mg for stroke prevention. She has carotid disease on the left for which she takes Plavix. She has epilepsy subsequent to her strokes. She is on Depakote 500 twice daily which helps with seizures and behavior control. She is also on Zoloft. She sees psychiatry at [facility]. Upon further review of the records and [facility] her last MRI in March 2017 suggested a possible underlying metastatic mass lesion. Upon my review of the neurologist notes there was question whether or not this was subacute ischemia versus mass lesion. A metastatic mass lesion was not ruled out. Currently the patient's level of function is that she has a right plegic arm and aphasia. She has about fourth grade communication level according to the record. She has been going to therapies. Her  is her caretaker. Since being on Depakote she has not had more seizures. She has had 2 cardiac evaluations to include ILR both of which were unrevealing for A. Fib per .       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 Social History Main Topics    Smoking status: Never Smoker    Smokeless tobacco: Never Used    Alcohol use No    Drug use: No    Sexual activity: Not Currently     Partners: Male     Other Topics Concern    Not on file     Social History Narrative     Current Outpatient Prescriptions   Medication Sig    levothyroxine (SYNTHROID) 50 mcg tablet TAKE 1 TAB BY MOUTH DAILY (BEFORE BREAKFAST).  atorvastatin (LIPITOR) 20 mg tablet TAKE 1 TABLET BY MOUTH EVERY DAY    divalproex DR (DEPAKOTE) 250 mg tablet TAKE 3 TABLETS BY MOUTH EVERY 12 HOURS    sertraline (ZOLOFT) 100 mg tablet Take 150 mg by mouth daily.  clopidogrel (PLAVIX) 75 mg tablet Take 1 Tab by mouth daily.  fish oil-omega-3 fatty acids 340-1,000 mg capsule Take 1 Cap by mouth daily.  Calcium Carbonate-Vit D3-Min (CALTRATE 600+D PLUS MINERALS) 600 mg calcium- 400 unit tab Take 1 Tab by mouth daily. No current facility-administered medications for this visit. Allergies   Allergen Reactions    Rofecoxib Hives and Rash     Other reaction(s): Hives, Hives    Aleve [Naproxen Sodium] Nausea and Vomiting    Motrin [Ibuprofen] Nausea and Vomiting    Percocet [Oxycodone-Acetaminophen] Other (comments)     Other reaction(s): Hallucinations (finding)  hallucinations         Neurologic Exam     Mental Status   Level of consciousness: alert       Awake and alert.   Some difficulty following commands due to impaired comprehension in the setting of aphasia  Naming to high frequency items intact with hesitation  Impaired repetition  Cannot write     Cranial Nerves     CN III, IV, VI   Pupils are equal, round, and reactive to light. Extraocular motions are normal.   Ophthalmoparesis: none    CN VII   Right facial weakness: central    CN XII   Tongue deviation: none    Motor Exam   Muscle bulk: normal       Right arm plegic with minimal flexion of the fingers  All else 5/5     Sensory Exam        Unreliable testing     Gait, Coordination, and Reflexes     Gait  Gait: normal    Reflexes   Right brachioradialis: 2+  Left brachioradialis: 1+  Right biceps: 2+  Left biceps: 1+  Right patellar: 2+  Left patellar: 2+    Physical Exam   Constitutional: She appears well-developed and well-nourished. Eyes: EOM are normal. Pupils are equal, round, and reactive to light. Cardiovascular: Normal rate. Pulmonary/Chest: Effort normal.   Neurological: Gait normal.   Reflex Scores:       Bicep reflexes are 2+ on the right side and 1+ on the left side. Brachioradialis reflexes are 2+ on the right side and 1+ on the left side. Patellar reflexes are 2+ on the right side and 2+ on the left side. Skin: Skin is warm and dry. Psychiatric: Her behavior is normal.   Vitals reviewed.     Visit Vitals    /74    Pulse 65    Resp 18    Wt 72.6 kg (160 lb)    SpO2 98%    BMI 22.96 kg/m2       Lab Results  Component Value Date/Time   WBC 5.2 12/11/2017 12:29 PM   HGB 12.2 12/11/2017 12:29 PM   Hemoglobin (POC) 13.6 07/26/2015 04:43 PM   HCT 36.4 12/11/2017 12:29 PM   Hematocrit (POC) 40 07/26/2015 04:43 PM   PLATELET 162 (L) 72/84/1909 12:29 PM   MCV 85 12/11/2017 12:29 PM     Lab Results  Component Value Date/Time   Hemoglobin A1c 6.0 (H) 07/06/2018 12:15 PM   Hemoglobin A1c 5.8 (H) 12/11/2017 12:29 PM   Hemoglobin A1c 6.0 (H) 12/12/2016 10:01 AM   Glucose 107 (H) 07/06/2018 12:15 PM   Glucose (POC) 121 (H) 09/17/2015 03:19 PM   LDL, calculated 46 12/11/2017 12:29 PM   Creatinine (POC) 1.2 07/26/2015 04:43 PM   Creatinine 1.20 (H) 07/06/2018 12:15 PM      Lab Results  Component Value Date/Time   Cholesterol, total 119 12/11/2017 12:29 PM   HDL Cholesterol 59 12/11/2017 12:29 PM   LDL, calculated 46 12/11/2017 12:29 PM   Triglyceride 68 12/11/2017 12:29 PM   CHOL/HDL Ratio 1.7 09/17/2015 03:42 PM     Lab Results  Component Value Date/Time   ALT (SGPT) 9 07/06/2018 12:15 PM   AST (SGOT) 10 07/06/2018 12:15 PM   Alk. phosphatase 58 07/06/2018 12:15 PM   Bilirubin, total 0.3 07/06/2018 12:15 PM   Albumin 4.5 07/06/2018 12:15 PM   Protein, total 7.4 07/06/2018 12:15 PM   INR 1.1 07/27/2015 03:20 AM   INR (POC) 1.0 09/17/2015 03:21 PM   Prothrombin time 10.9 07/27/2015 03:20 AM   PLATELET 903 (L) 66/00/8342 12:29 PM          CT Results (maximum last 3): Results from [place] encounter on 09/17/15   CTA NECK   Narrative **Final Report**      ICD Codes / Adm. 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 Unchanged nonspecific linear defect with minimal narrowing of   origin of left internal carotid artery. Cervical vessels otherwise normal.   No substantial intracranial stenosis demonstrated. Preliminary report was provided by Dr. Stuart Mckeon, the on-call radiologist, at   6:30 PM    Final report to follow. *END PRELIMINARY REPORT*    INDICATION: New CVA symptoms. Right-handed weakness. PROCEDURE: Multiple contiguous helically acquired images were obtained   through the cervical region and brain following intravenous contrast   administration, 97 mL. Sagittal and axial and coronal reconstructions were   performed to optimize evaluation of  the arterial vascular system. 3D post   processing was performed.     FINDINGS: Comparison exam:  July 26, 2015. Images through the thoracic arch reveal patency of the right innominate,   left common carotid, and left subclavian arteries. Both vertebral arteries   are patent. The vertebral arteries are roughly codominant. Images through the neck reveal patency of both common and internal and   external carotid arteries with no significant bifurcation flow-limiting   stenosis involving the right cervical carotid artery. However, there is a   short segment linear filling defect in the left carotid bifurcation region. Right internal carotid arterial stenosis:  5  % by NASCET criteria. Left internal carotid arterial stenosis:  60  % by NASCET criteria. Images through the brain reveal patency of the cavernous carotid arteries as   well as the bilateral anterior and middle cerebral arterial distributions. Vertebral and basilar arteries are patent. There is patency of the bilateral   posterior cerebral arteries. Superior sagittal sinus patent. No significant edema or hemorrhage or mass effect are identified. IMPRESSION:    No intracranial large vessel occlusive change. Old left temporoparietal infarct. Mild to moderate stenosis of the left cervical carotid artery and chronic   linear filling defect present possibly representing ulceration versus   limited dissection. Recommend duplex correlation. Signing/Reading Doctor: Tasha Vo. Sam Parra ([id number])    Tamika Burns ([id number])  Sep 18 2015  8:03AM                              CTA HEAD   Narrative **Final Report**      ICD Codes / Adm. 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 Unchanged nonspecific linear defect with minimal narrowing of   origin of left internal carotid artery. Cervical vessels otherwise normal.   No substantial intracranial stenosis demonstrated. Preliminary report was provided by Dr. Angeles Caraballo, the on-call radiologist, at   6:30 PM    Final report to follow. *END PRELIMINARY REPORT*    INDICATION: New CVA symptoms. Right-handed weakness. PROCEDURE: Multiple contiguous helically acquired images were obtained   through the cervical region and brain following intravenous contrast   administration, 97 mL. Sagittal and axial and coronal reconstructions were   performed to optimize evaluation of  the arterial vascular system. 3D post   processing was performed. FINDINGS: Comparison exam:  July 26, 2015. Images through the thoracic arch reveal patency of the right innominate,   left common carotid, and left subclavian arteries. Both vertebral arteries   are patent. The vertebral arteries are roughly codominant. Images through the neck reveal patency of both common and internal and   external carotid arteries with no significant bifurcation flow-limiting   stenosis involving the right cervical carotid artery. However, there is a   short segment linear filling defect in the left carotid bifurcation region. Right internal carotid arterial stenosis:  5  % by NASCET criteria. Left internal carotid arterial stenosis:  60  % by NASCET criteria. Images through the brain reveal patency of the cavernous carotid arteries as   well as the bilateral anterior and middle cerebral arterial distributions. Vertebral and basilar arteries are patent. There is patency of the bilateral   posterior cerebral arteries. Superior sagittal sinus patent. No significant edema or hemorrhage or mass effect are identified. IMPRESSION:    No intracranial large vessel occlusive change. Old left temporoparietal infarct.     Mild to moderate stenosis of the left cervical carotid artery and chronic   linear filling defect present possibly representing ulceration versus   limited dissection. Recommend duplex correlation. Signing/Reading Doctor: Aneudy Hernandez. Charlotte Lloyd ([id number])    Approved: Yuly Wylie ([id number])  Sep 18 2015  8:03AM                                MRI Results (maximum last 3): Results from [facility] encounter on 05/04/17   MRI BRAIN W WO CONT  Results from [place] encounter on 09/17/15   MRI BRAIN WO CONT   Narrative **Final Report**      ICD Codes / Adm. 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 Stroke work up      REPORT:  INDICATION:   OBSERVATION Patient. Stroke work up     EXAMINATION:  MRI BRAIN WO CONTRAST    COMPARISON:  MRI July 27, 2015 and CT 9/17/2015    TECHNIQUE:  MR imaging of the brain was performed with sagittal T1, axial   T1, T2, FLAIR, GRE, DWI/ADC, coronal T2.    FINDINGS:      The ventricles are midline without hydrocephalus. There is no acute intra or   extra-axial fluid collection. There is chronic hemorrhagic infarction   posterior left temporal anterior left parietal lobes. There is a punctate   focus of acute infarction left centrum semiovale. There is scattered   supratentorial white matter disease likely due to chronic small vessel   ischemic changes. The major intracranial vascular flow-voids are patent. IMPRESSION:  Punctate acute infarction left centrum semiovale. Background of chronic   ischemic changes including remote hemorrhagic left posterior temporal   /anterior parietal lobe infarction.            Signing/Reading Doctor: Dean Callahan ([id number])    Approved: Dean Callahan ([id number])  Sep 18 2015  1:12PM                                   Results from Hospital Encounter encounter on 07/26/15   MRI BRAIN W WO CONT   Narrative **Final Report**      ICD Codes / Adm. Diagnosis: 434.01  780.39 / Cerebral thrombosis with cereb    Other convulsions [facility])  Examination:  MR BRAIN Bull Stevens  - [id number] - Jul 27 2015  2:56PM  Accession No:  [id number]  Reason:  Right arm weakness, seizure      REPORT:  CLINICAL HISTORY: Right arm weakness and new onset seizure        INDICATION: Right arm weakness, seizure. COMPARISON: 1/12/2014     TECHNIQUE: MR examination of the brain includes axial and sagittal T1   pre-and-post contrast, axial T2, axial FLAIR, axial gradient echo, axial   DWI, coronal T1 postcontrast.    Contrast: The patient was administered gadolinium-based contrast material   axial and coronal T1-weighted postcontrast enhanced imaging was obtained. FINDINGS:     There is moderate-sized remote infarction with encephalomalacia affecting   most of the posterior portion of the left temporal lobe and portions of the   contiguous insular cortex. There is no evidence of new infarction. Axial   vacuo dilatation of the posterior horn of the lateral ventricle on the left   related to the infarct. Minimal scattered foci of increased T2 to signal   intensity in the cerebral white matter consistent with chronic microvascular   ischemic change. The major cranial vessels flow-voids are unremarkable. There is no abnormal parenchymal enhancement. There is no abnormal meningeal   enhancement. There is no Chiari or syrinx. .    Enhancement is unremarkable. There is no midline shift. There is no evidence   for mass or hydrocephalus. There is no extra-axial fluid collection. IMPRESSION:  No intracranial mass, hemorrhage or evidence of acute infarction. Moderate to large note infarction of the posterior portion the left temporal   lobe and the left parietal lobe. There is encephalomalacia in this region.            Signing/Reading Doctor: Lizeth Yadav ([id number])    Approved: Lizeth Yadav ([id number])  Jul 27 2015  3:12PM PET Results (maximum last 3): No results found for this or any previous visit. Assessment and Plan   Diagnoses and all orders for this visit:    1. Chronic ischemic left MCA stroke  -     MRI BRAIN W WO CONT; Future  -     CTA HEAD NECK W CONT; Future    2. Partial symptomatic epilepsy with complex partial seizures, not intractable, without status epilepticus ([facility] [address]  -     MRI BRAIN W WO CONT; Future  -     CTA HEAD NECK W CONT; Future    3. Carotid stenosis, left  -     MRI BRAIN W WO CONT; Future  -     CTA HEAD NECK W CONT; Future      82-year-old woman who has a history of what sounds like to left hemispheric infarcts I suspect this may have been due to her left carotid disease. She has right hemiplegia and motor aphasia. She takes Plavix for prevention. She cannot have combination therapy because of her history of ICH according to the records. I am going to repeat a CTA evaluating the left carotid disease. I am going to repeat the MRI of the brain to further assess this question of underlying contrast-enhancing mass lesion which has not been ruled out according to [facility] neurology notes. Continue Depakote and Plavix as is. Continue Zoloft. Continue therapies. I will see them after the studies are done. I reviewed and decided to continue the current medications. A notice of this visit/encounter being completed has been sent electronically to the patient's PCP and/or referring provider.      2 [facility], [facility] [address]  Diplomate ALEXANDRAN

## 2018-07-30 NOTE — MR AVS SNAPSHOT
[facility] [address] [place] [address] 
[phone] Patient: Alma Ruiz MRN: [id number] :1945 Visit Information Date & Time Provider Department Dept. Phone Encounter #  
 2018  3:00  [facility] [address], [address] [facility] at [facility] [phone] Follow-up Instructions Return in about 2 months (around 10/2/2018). Routing History Your Appointments 2019 11:40 AM  
ROUTINE CARE with MD Regine Villalta [facility] ([facility]) Appt Note: 6m fu for hypothyroid [address] , [address] [place] [address]  
[address], Tanesha Tish [address] [id number] Upcoming Health Maintenance Date Due ZOSTER VACCINE AGE 60> 2007 GLAUCOMA SCREENING Q2Y 10/7/2016 COLONOSCOPY 2019 Influenza Age 5 to Adult 2018 MEDICARE YEARLY EXAM 2018 BREAST CANCER SCRN MAMMOGRAM 2020 DTaP/Tdap/Td series (2 - Td) 2027 Allergies as of 2018  Review Complete On: 2018 By: Ricarda Persaud LPN Severity Noted Reaction Type Reactions Rofecoxib Medium 2016    Hives, Rash Other reaction(s): Hives, Hives Aleve [Naproxen Sodium]  2011   Side Effect Nausea and Vomiting Motrin [Ibuprofen]  2011   Side Effect Nausea and Vomiting Percocet [Oxycodone-acetaminophen]  2011   Side Effect Other (comments) Other reaction(s): Hallucinations (finding) 
hallucinations Current Immunizations  Reviewed on 2018 Name Date Influenza High Dose Vaccine PF 2018 12:00 AM, 2016 Influenza Vaccine Intradermal PF 2013 Influenza Vaccine Whole 10/6/2009 Pneumococcal Conjugate (PCV-13) 2016 Pneumococcal Polysaccharide (PPSV-23) 2018 12:00 AM, 2013  8:45 AM  
  
 Not reviewed this visit You Were Diagnosed With   
  
 Codes Comments Chronic ischemic left MCA stroke    -  Primary ICD-10-CM: I69.30 ICD-9-CM: V12.54 Partial symptomatic epilepsy with complex partial seizures, not intractable, without status epilepticus ([facility]     ICD-10-CM: Y04.840 ICD-9-CM: 345.40 Carotid stenosis, left     ICD-10-CM: C63.53 
ICD-9-CM: 433.10 Vitals BP Pulse Resp Weight(growth percentile) SpO2 BMI  
 116/74 65 18 160 lb (72.6 kg) 98% 22.96 kg/m2 OB Status Smoking Status Postmenopausal Never Smoker Vitals History BMI and BSA Data Body Mass Index Body Surface Area  
 22.96 kg/m 2 1.89 m 2 Preferred Pharmacy Pharmacy Name Phone [id number], [address] [place] [address] [phone] Your Updated Medication List  
  
   
This list is accurate as of 7/30/18  3:21 PM.  Always use your most recent med list.  
  
  
  
  
 atorvastatin 20 mg tablet Commonly known as:  LIPITOR  
TAKE 1 TABLET BY MOUTH EVERY DAY  
  
 CALTRATE 600+D PLUS MINERALS 600 mg calcium- 400 unit Tab Generic drug:  Calcium Carbonate-Vit D3-Min Take 1 Tab by mouth daily. clopidogrel 75 mg Tab Commonly known as:  PLAVIX Take 1 Tab by mouth daily. divalproex  mg tablet Commonly known as:  DEPAKOTE  
TAKE 3 TABLETS BY MOUTH EVERY 12 HOURS  
  
 fish oil-omega-3 fatty acids 340-1,000 mg capsule Take 1 Cap by mouth daily. levothyroxine 50 mcg tablet Commonly known as:  SYNTHROID  
TAKE 1 TAB BY MOUTH DAILY (BEFORE BREAKFAST). sertraline 100 mg tablet Commonly known as:  ZOLOFT Take 150 mg by mouth daily. Follow-up Instructions Return in about 2 months (around 10/2/2018). To-Do List   
 07/30/2018 Imaging:  CTA HEAD NECK W CONT   
  
 07/30/2018 Imaging:  MRI BRAIN W WO CONT Patient Instructions A Healthy Lifestyle: Care Instructions Your Care Instructions A healthy lifestyle can help you feel good, stay at a healthy weight, and have plenty of energy for both work and play. A healthy lifestyle is something you can share with your whole family. A healthy lifestyle also can lower your risk for serious health problems, such as high blood pressure, heart disease, and diabetes. You can follow a few steps listed below to improve your health and the health of your family. Follow-up care is a key part of your treatment and safety. Be sure to make and go to all appointments, and call your doctor if you are having problems. It's also a good idea to know your test results and keep a list of the medicines you take. How can you care for yourself at home? · Do not eat too much sugar, fat, or fast foods. You can still have dessert and treats now and then. The goal is moderation. · Start small to improve your eating habits. Pay attention to portion sizes, drink less juice and soda pop, and eat more fruits and vegetables. ¨ Eat a healthy amount of food. A 3-ounce serving of meat, for example, is about the size of a deck of cards. Fill the rest of your plate with vegetables and whole grains. ¨ Limit the amount of soda and sports drinks you have every day. Drink more water when you are thirsty. ¨ Eat at least 5 servings of fruits and vegetables every day. It may seem like a lot, but it is not hard to reach this goal. A serving or helping is 1 piece of fruit, 1 cup of vegetables, or 2 cups of leafy, raw vegetables. Have an apple or some carrot sticks as an afternoon snack instead of a candy bar. Try to have fruits and/or vegetables at every meal. 
· Make exercise part of your daily routine. You may want to start with simple activities, such as walking, bicycling, or slow swimming. Try to be active 30 to 60 minutes every day. You do not need to do all 30 to 60 minutes all at once.  For example, you can exercise 3 times a day for 10 or 20 minutes. Moderate exercise is safe for most people, but it is always a good idea to talk to your doctor before starting an exercise program. 
· Keep moving. Rajni Mckenzie the lawn, work in the garden, or [employer]4. Take the stairs instead of the elevator at work. · If you smoke, quit. People who smoke have an increased risk for heart attack, stroke, cancer, and other lung illnesses. Quitting is hard, but there are ways to boost your chance of quitting tobacco for good. ¨ Use nicotine gum, patches, or lozenges. ¨ Ask your doctor about stop-smoking programs and medicines. ¨ Keep trying. In addition to reducing your risk of diseases in the future, you will notice some benefits soon after you stop using tobacco. If you have shortness of breath or asthma symptoms, they will likely get better within a few weeks after you quit. · Limit how much alcohol you drink. Moderate amounts of alcohol (up to 2 drinks a day for men, 1 drink a day for women) are okay. But drinking too much can lead to liver problems, high blood pressure, and other health problems. Family health If you have a family, there are many things you can do together to improve your health. · Eat meals together as a family as often as possible. · Eat healthy foods. This includes fruits, vegetables, lean meats and dairy, and whole grains. · Include your family in your fitness plan. Most people think of activities such as jogging or tennis as the way to fitness, but there are many ways you and your family can be more active. Anything that makes you breathe hard and gets your heart pumping is exercise. Here are some tips: 
¨ Walk to do errands or to take your child to school or the bus. ¨ Go for a family bike ride after dinner instead of watching TV. Where can you learn more? Go to http://haydee-gely.info/. Enter U904 in the search box to learn more about \"A Healthy Lifestyle: Care Instructions. \" Current as of: December 7, 2017 Content Version: 11.7 © 0019-7729 [employer]. Care instructions adapted under license by [employer] (which disclaims liability or warranty for this information). If you have questions about a medical condition or this instruction, always ask your healthcare professional. [address] any warranty or liability for your use of this information. Introducing [facility]! [employer] introduces YOUnite patient portal. Now you can access parts of your medical record, email your doctor's office, and request medication refills online. 1. In your internet browser, go to [email or web address] 2. Click on the First Time User? Click Here link in the Sign In box. You will see the New Member Sign Up page. 3. Enter your YOUnite Access Code exactly as it appears below. You will not need to use this code after youve completed the sign-up process. If you do not sign up before the expiration date, you must request a new code. · YOUnite Access Code: [email or web address] Expires: 9/16/2018  9:40 AM 
 
4. Enter the last four digits of your Social Security Number (xxxx) and Date of Birth (mm/dd/yyyy) as indicated and click Submit. You will be taken to the next sign-up page. 5. Create a YOUnite ID. This will be your YOUnite login ID and cannot be changed, so think of one that is secure and easy to remember. 6. Create a YOUnite password. You can change your password at any time. 7. Enter your Password Reset Question and Answer. This can be used at a later time if you forget your password. 8. Enter your e-mail address. You will receive e-mail notification when new information is available in [address] 9. Click Sign Up. You can now view and download portions of your medical record. 10. Click the Download Summary menu link to download a portable copy of your medical information. If you have questions, please visit the Frequently Asked Questions section of the YouFigt website. Remember, [employer] is NOT to be used for urgent needs. For medical emergencies, dial 911. Now available from your iPhone and Android! Please provide this summary of care documentation to your next provider. Your primary care clinician is listed as Nubia Lopez. If you have any questions after today's visit, please call [phone].

## 2018-07-30 NOTE — PATIENT INSTRUCTIONS

## 2018-08-06 ENCOUNTER — PATIENT OUTREACH (OUTPATIENT)
Dept: INTERNAL MEDICINE CLINIC | Age: 73
End: 2018-08-06

## 2018-08-06 NOTE — PROGRESS NOTES
Returned call to patient's , Mr. Magda Lundy (on HIPPA) who requested name of counselor/psychologist. ID verified with two identifiers.   It was agreed Mr. Magda Lundy will contact patient's psychiatrist and request psychologist referral.

## 2018-08-10 ENCOUNTER — HOSPITAL ENCOUNTER (OUTPATIENT)
Dept: CT IMAGING | Age: 73
Discharge: HOME | End: 2018-08-10
Attending: PSYCHIATRY & NEUROLOGY
Payer: MEDICARE

## 2018-08-10 DIAGNOSIS — G40.209 PARTIAL SYMPTOMATIC EPILEPSY WITH COMPLEX PARTIAL SEIZURES, NOT INTRACTABLE, WITHOUT STATUS EPILEPTICUS (HCC): ICD-10-CM

## 2018-08-10 DIAGNOSIS — I69.30 CHRONIC ISCHEMIC LEFT MCA STROKE: ICD-10-CM

## 2018-08-10 DIAGNOSIS — I65.22 CAROTID STENOSIS, LEFT: ICD-10-CM

## 2018-08-10 PROCEDURE — 74011636320 HC RX REV CODE- 636/320: Performed by: PSYCHIATRY & NEUROLOGY

## 2018-08-10 PROCEDURE — 70498 CT ANGIOGRAPHY NECK: CPT

## 2018-08-10 PROCEDURE — 74011000258 HC RX REV CODE- 258: Performed by: PSYCHIATRY & NEUROLOGY

## 2018-08-10 PROCEDURE — 70496 CT ANGIOGRAPHY HEAD: CPT

## 2018-08-10 RX ORDER — SODIUM CHLORIDE 0.9 % (FLUSH) 0.9 %
10 SYRINGE (ML) INJECTION
Status: COMPLETED | OUTPATIENT
Start: 2018-08-10 | End: 2018-08-10

## 2018-08-10 RX ADMIN — SODIUM CHLORIDE 100 ML: 900 INJECTION, SOLUTION INTRAVENOUS at 13:52

## 2018-08-10 RX ADMIN — Medication 10 ML: at 13:52

## 2018-08-10 RX ADMIN — IOPAMIDOL 100 ML: 755 INJECTION, SOLUTION INTRAVENOUS at 13:52

## 2018-08-13 ENCOUNTER — TELEPHONE (OUTPATIENT)
Dept: NEUROLOGY | Age: 73
End: 2018-08-13

## 2018-08-13 NOTE — TELEPHONE ENCOUNTER
CTA was done evaluating the left carotid. No changes since last studies in 2015 which is reassuring. No change to her antiplatelet regimen. I am awaiting the MRI results regarding this possible underlying mass lesion.

## 2018-08-14 NOTE — TELEPHONE ENCOUNTER
Spoke with pt and relayed the results from Dr Edgard Padron. Pt voiced her understanding of this information. Patient/Caregiver provided printed discharge information.

## 2018-09-05 ENCOUNTER — HOSPITAL ENCOUNTER (OUTPATIENT)
Age: 73
Setting detail: OBSERVATION
Discharge: HOME | End: 2018-09-06
Attending: EMERGENCY MEDICINE | Admitting: HOSPITALIST
Payer: MEDICARE

## 2018-09-05 ENCOUNTER — APPOINTMENT (OUTPATIENT)
Dept: MRI IMAGING | Age: 73
End: 2018-09-05
Attending: HOSPITALIST
Payer: MEDICARE

## 2018-09-05 ENCOUNTER — APPOINTMENT (OUTPATIENT)
Dept: CT IMAGING | Age: 73
End: 2018-09-05
Attending: EMERGENCY MEDICINE
Payer: MEDICARE

## 2018-09-05 ENCOUNTER — APPOINTMENT (OUTPATIENT)
Dept: MRI IMAGING | Age: 73
End: 2018-09-05
Attending: PSYCHIATRY & NEUROLOGY
Payer: MEDICARE

## 2018-09-05 DIAGNOSIS — G93.89: ICD-10-CM

## 2018-09-05 DIAGNOSIS — R56.9 SEIZURE (HCC): ICD-10-CM

## 2018-09-05 DIAGNOSIS — G45.8 OTHER SPECIFIED TRANSIENT CEREBRAL ISCHEMIAS: Primary | ICD-10-CM

## 2018-09-05 DIAGNOSIS — F05: ICD-10-CM

## 2018-09-05 DIAGNOSIS — R41.82 ALTERED MENTAL STATUS, UNSPECIFIED ALTERED MENTAL STATUS TYPE: ICD-10-CM

## 2018-09-05 LAB
ALBUMIN SERPL-MCNC: 4 G/DL (ref 3.5–5)
ALBUMIN/GLOB SERPL: 0.9 {RATIO} (ref 1.1–2.2)
ALP SERPL-CCNC: 71 U/L (ref 45–117)
ALT SERPL-CCNC: 15 U/L (ref 12–78)
ANION GAP SERPL CALC-SCNC: 11 MMOL/L (ref 5–15)
APPEARANCE UR: CLEAR
APTT PPP: 28.3 SEC (ref 22.1–32)
AST SERPL-CCNC: 17 U/L (ref 15–37)
ATRIAL RATE: 59 BPM
BASOPHILS # BLD: 0 K/UL (ref 0–0.1)
BASOPHILS NFR BLD: 0 % (ref 0–1)
BILIRUB SERPL-MCNC: 0.4 MG/DL (ref 0.2–1)
BILIRUB UR QL: NEGATIVE
BUN SERPL-MCNC: 19 MG/DL (ref 6–20)
BUN/CREAT SERPL: 15 (ref 12–20)
CALCIUM SERPL-MCNC: 9.6 MG/DL (ref 8.5–10.1)
CALCULATED P AXIS, ECG09: 64 DEGREES
CALCULATED R AXIS, ECG10: 22 DEGREES
CALCULATED T AXIS, ECG11: 39 DEGREES
CHLORIDE SERPL-SCNC: 107 MMOL/L (ref 97–108)
CO2 SERPL-SCNC: 25 MMOL/L (ref 21–32)
COLOR UR: NORMAL
COMMENT, HOLDF: NORMAL
CREAT SERPL-MCNC: 1.27 MG/DL (ref 0.55–1.02)
DIAGNOSIS, 93000: NORMAL
DIFFERENTIAL METHOD BLD: ABNORMAL
EOSINOPHIL # BLD: 0 K/UL (ref 0–0.4)
EOSINOPHIL NFR BLD: 1 % (ref 0–7)
ERYTHROCYTE [DISTWIDTH] IN BLOOD BY AUTOMATED COUNT: 14.3 % (ref 11.5–14.5)
GLOBULIN SER CALC-MCNC: 4.5 G/DL (ref 2–4)
GLUCOSE SERPL-MCNC: 86 MG/DL (ref 65–100)
GLUCOSE UR STRIP.AUTO-MCNC: NEGATIVE MG/DL
HCT VFR BLD AUTO: 39.3 % (ref 35–47)
HGB BLD-MCNC: 13.2 G/DL (ref 11.5–16)
HGB UR QL STRIP: NEGATIVE
IMM GRANULOCYTES # BLD: 0 K/UL (ref 0–0.04)
IMM GRANULOCYTES NFR BLD AUTO: 0 % (ref 0–0.5)
INR PPP: 1 (ref 0.9–1.1)
KETONES UR QL STRIP.AUTO: NEGATIVE MG/DL
LEUKOCYTE ESTERASE UR QL STRIP.AUTO: NEGATIVE
LYMPHOCYTES # BLD: 1.5 K/UL (ref 0.8–3.5)
LYMPHOCYTES NFR BLD: 26 % (ref 12–49)
MCH RBC QN AUTO: 29.1 PG (ref 26–34)
MCHC RBC AUTO-ENTMCNC: 33.6 G/DL (ref 30–36.5)
MCV RBC AUTO: 86.8 FL (ref 80–99)
MONOCYTES # BLD: 0.7 K/UL (ref 0–1)
MONOCYTES NFR BLD: 12 % (ref 5–13)
NEUTS SEG # BLD: 3.3 K/UL (ref 1.8–8)
NEUTS SEG NFR BLD: 60 % (ref 32–75)
NITRITE UR QL STRIP.AUTO: NEGATIVE
NRBC # BLD: 0 K/UL (ref 0–0.01)
NRBC BLD-RTO: 0 PER 100 WBC
P-R INTERVAL, ECG05: 136 MS
PH UR STRIP: 8 [PH] (ref 5–8)
PLATELET # BLD AUTO: 107 K/UL (ref 150–400)
PMV BLD AUTO: 12.9 FL (ref 8.9–12.9)
POTASSIUM SERPL-SCNC: 4.1 MMOL/L (ref 3.5–5.1)
PROT SERPL-MCNC: 8.5 G/DL (ref 6.4–8.2)
PROT UR STRIP-MCNC: NEGATIVE MG/DL
PROTHROMBIN TIME: 10.3 SEC (ref 9–11.1)
Q-T INTERVAL, ECG07: 422 MS
QRS DURATION, ECG06: 80 MS
QTC CALCULATION (BEZET), ECG08: 417 MS
RBC # BLD AUTO: 4.53 M/UL (ref 3.8–5.2)
SAMPLES BEING HELD,HOLD: NORMAL
SODIUM SERPL-SCNC: 143 MMOL/L (ref 136–145)
SP GR UR REFRACTOMETRY: 1.01 (ref 1–1.03)
THERAPEUTIC RANGE,PTTT: NORMAL SECS (ref 58–77)
UR CULT HOLD, URHOLD: NORMAL
UROBILINOGEN UR QL STRIP.AUTO: 0.2 EU/DL (ref 0.2–1)
VALPROATE SERPL-MCNC: 110 UG/ML (ref 50–100)
VENTRICULAR RATE, ECG03: 59 BPM
WBC # BLD AUTO: 5.5 K/UL (ref 3.6–11)

## 2018-09-05 PROCEDURE — 85610 PROTHROMBIN TIME: CPT | Performed by: EMERGENCY MEDICINE

## 2018-09-05 PROCEDURE — 36415 COLL VENOUS BLD VENIPUNCTURE: CPT | Performed by: HOSPITALIST

## 2018-09-05 PROCEDURE — 80053 COMPREHEN METABOLIC PANEL: CPT | Performed by: EMERGENCY MEDICINE

## 2018-09-05 PROCEDURE — 99218 HC RM OBSERVATION: CPT

## 2018-09-05 PROCEDURE — 95816 EEG AWAKE AND DROWSY: CPT | Performed by: HOSPITALIST

## 2018-09-05 PROCEDURE — 85025 COMPLETE CBC W/AUTO DIFF WBC: CPT | Performed by: HOSPITALIST

## 2018-09-05 PROCEDURE — 93005 ELECTROCARDIOGRAM TRACING: CPT

## 2018-09-05 PROCEDURE — 70544 MR ANGIOGRAPHY HEAD W/O DYE: CPT

## 2018-09-05 PROCEDURE — 85730 THROMBOPLASTIN TIME PARTIAL: CPT | Performed by: EMERGENCY MEDICINE

## 2018-09-05 PROCEDURE — A9575 INJ GADOTERATE MEGLUMI 0.1ML: HCPCS | Performed by: EMERGENCY MEDICINE

## 2018-09-05 PROCEDURE — 81003 URINALYSIS AUTO W/O SCOPE: CPT | Performed by: EMERGENCY MEDICINE

## 2018-09-05 PROCEDURE — 80164 ASSAY DIPROPYLACETIC ACD TOT: CPT | Performed by: PSYCHIATRY & NEUROLOGY

## 2018-09-05 PROCEDURE — 74011250636 HC RX REV CODE- 250/636: Performed by: EMERGENCY MEDICINE

## 2018-09-05 PROCEDURE — 99285 EMERGENCY DEPT VISIT HI MDM: CPT

## 2018-09-05 PROCEDURE — 74011250637 HC RX REV CODE- 250/637: Performed by: HOSPITALIST

## 2018-09-05 PROCEDURE — 70553 MRI BRAIN STEM W/O & W/DYE: CPT

## 2018-09-05 PROCEDURE — 70450 CT HEAD/BRAIN W/O DYE: CPT

## 2018-09-05 RX ORDER — CLOPIDOGREL BISULFATE 75 MG/1
75 TABLET ORAL DAILY
Status: DISCONTINUED | OUTPATIENT
Start: 2018-09-06 | End: 2018-09-05

## 2018-09-05 RX ORDER — SODIUM CHLORIDE 0.9 % (FLUSH) 0.9 %
5-10 SYRINGE (ML) INJECTION EVERY 8 HOURS
Status: DISCONTINUED | OUTPATIENT
Start: 2018-09-05 | End: 2018-09-06 | Stop reason: HOSPADM

## 2018-09-05 RX ORDER — GADOTERATE MEGLUMINE 376.9 MG/ML
15 INJECTION INTRAVENOUS
Status: COMPLETED | OUTPATIENT
Start: 2018-09-05 | End: 2018-09-05

## 2018-09-05 RX ORDER — LEVOTHYROXINE SODIUM 50 UG/1
50 TABLET ORAL
Status: DISCONTINUED | OUTPATIENT
Start: 2018-09-06 | End: 2018-09-06 | Stop reason: HOSPADM

## 2018-09-05 RX ORDER — SODIUM CHLORIDE 0.9 % (FLUSH) 0.9 %
5-10 SYRINGE (ML) INJECTION AS NEEDED
Status: DISCONTINUED | OUTPATIENT
Start: 2018-09-05 | End: 2018-09-06 | Stop reason: HOSPADM

## 2018-09-05 RX ORDER — ACETAMINOPHEN 650 MG/1
650 SUPPOSITORY RECTAL
Status: DISCONTINUED | OUTPATIENT
Start: 2018-09-05 | End: 2018-09-06 | Stop reason: HOSPADM

## 2018-09-05 RX ORDER — SERTRALINE HYDROCHLORIDE 50 MG/1
100 TABLET, FILM COATED ORAL EVERY 12 HOURS
Status: DISCONTINUED | OUTPATIENT
Start: 2018-09-05 | End: 2018-09-06 | Stop reason: HOSPADM

## 2018-09-05 RX ORDER — DIVALPROEX SODIUM 500 MG/1
500 TABLET, DELAYED RELEASE ORAL EVERY 12 HOURS
Status: DISCONTINUED | OUTPATIENT
Start: 2018-09-05 | End: 2018-09-06 | Stop reason: HOSPADM

## 2018-09-05 RX ORDER — ATORVASTATIN CALCIUM 20 MG/1
20 TABLET, FILM COATED ORAL
Status: DISCONTINUED | OUTPATIENT
Start: 2018-09-05 | End: 2018-09-06 | Stop reason: HOSPADM

## 2018-09-05 RX ORDER — SODIUM CHLORIDE 0.9 % (FLUSH) 0.9 %
10 SYRINGE (ML) INJECTION
Status: COMPLETED | OUTPATIENT
Start: 2018-09-05 | End: 2018-09-05

## 2018-09-05 RX ORDER — CLOPIDOGREL BISULFATE 75 MG/1
75 TABLET ORAL DAILY
Status: DISCONTINUED | OUTPATIENT
Start: 2018-09-06 | End: 2018-09-06 | Stop reason: HOSPADM

## 2018-09-05 RX ORDER — ACETAMINOPHEN 325 MG/1
650 TABLET ORAL
Status: DISCONTINUED | OUTPATIENT
Start: 2018-09-05 | End: 2018-09-06 | Stop reason: HOSPADM

## 2018-09-05 RX ADMIN — Medication 10 ML: at 16:03

## 2018-09-05 RX ADMIN — Medication 10 ML: at 17:00

## 2018-09-05 RX ADMIN — ATORVASTATIN CALCIUM 20 MG: 20 TABLET, FILM COATED ORAL at 21:20

## 2018-09-05 RX ADMIN — DIVALPROEX SODIUM 500 MG: 500 TABLET, DELAYED RELEASE ORAL at 21:20

## 2018-09-05 RX ADMIN — GADOTERATE MEGLUMINE 15 ML: 376.9 INJECTION INTRAVENOUS at 16:03

## 2018-09-05 RX ADMIN — SERTRALINE HYDROCHLORIDE 100 MG: 50 TABLET ORAL at 21:20

## 2018-09-05 RX ADMIN — Medication 10 ML: at 21:21

## 2018-09-05 NOTE — PROGRESS NOTES
1943: Bedside shift change report given to [employer] (oncoming nurse) by Carmelo Recinos (offgoing nurse). Report included the following information SBAR, Intake/Output, MAR, Accordion, Recent Results, Med Rec Status and Cardiac Rhythm SB/NSR.  
 
6321: Bedside shift change report given to Anastasiya Regan (oncoming nurse) by [employer] (offgoing nurse). Report included the following information SBAR, Intake/Output, MAR, Accordion, Recent Results, Med Rec Status and Cardiac Rhythm SB/NSR.

## 2018-09-05 NOTE — PROGRESS NOTES
Admission Medication Reconciliation: 
 
Information obtained from:  Patient's  Comments/Recommendations: All medications/allergies have been reviewed and updated; last medication administration times reviewed and recorded. GUERRERO's  was very knowledgeable about her medications and administration times. He was able to confirm her current medications as well as doses. Changes made to Prior to Admission (PTA) Medication List: ? Medications Added: - Women's multivitamin ? Medications Changed: - Divalproex  mg 3 tablets Q12H to 2 tablets Q12H 
- Sertraline 100 mg 1.5 tablets every day to 1 tablets BID ? Medications Removed:  
- None Significant PMH/Disease States:  
Past Medical History:  
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  
Prior to Admission Medications Prescriptions Last Dose Informant Patient Reported? Taking? Calcium Carbonate-Vit D3-Min (CALTRATE 600+D PLUS MINERALS) 600 mg calcium- 400 unit tab 9/5/2018  Yes Yes Sig: Take 1 Tab by mouth daily. atorvastatin (LIPITOR) 20 mg tablet 9/4/2018 at 2200  No Yes Sig: TAKE 1 TABLET BY MOUTH EVERY DAY  
clopidogrel (PLAVIX) 75 mg tablet 9/5/2018 at 1000  No Yes Sig: Take 1 Tab by mouth daily. divalproex DR (DEPAKOTE) 250 mg tablet 9/5/2018 at 1000  Yes Yes Sig: TAKE 2 TABLETS BY MOUTH EVERY 12 HOURS  
fish oil-omega-3 fatty acids 340-1,000 mg capsule 9/5/2018  Yes Yes Sig: Take 1 Cap by mouth daily. levothyroxine (SYNTHROID) 50 mcg tablet 9/5/2018 at AM  No Yes Sig: TAKE 1 TAB BY MOUTH DAILY (BEFORE BREAKFAST). mv-min/iron/folic/calcium/vitK (WOMEN'S MULTIVITAMIN PO) 9/5/2018  Yes Yes Sig: Take  by mouth. sertraline (ZOLOFT) 100 mg tablet 9/5/2018 at 1000  Yes Yes Sig: Take 100 mg by mouth every twelve (12) hours. Facility-Administered Medications: None Thank you for allowing pharmacy to participate in the coordination of this patient's care. If you have any other questions, please contact the medication reconciliation pharmacist at [phone]. Sign-off:Mitesh Brizuela, PharmD Candidate [id number]

## 2018-09-05 NOTE — PROGRESS NOTES
Spiritual Care Assessment/Progress Note [address] 
 
 
NAME: Jeanette Buitrago      MRN: [id number] AGE: 81 y.o. SEX: female Yazdanism Affiliation: Faith  
Language: [place] 9/5/2018     Total Time (in minutes): 6 Spiritual Assessment begun in [address] [facility] [address] DEP through conversation with: 
  
    []Patient        [] Family    [] Friend(s) Reason for Consult: Other (comment) (Code Stroke) Spiritual beliefs: (Please include comment if needed) 
   [] Identifies with a sim tradition:     
   [] Supported by a sim community:        
   [] Claims no spiritual orientation:       
   [] Seeking spiritual identity:            
   [] Adheres to an individual form of spirituality:       
   [x] Not able to assess:                   
 
    
Identified resources for coping:  
   [] Prayer                           
   [] Music                  [] Guided Imagery 
   [] Family/friends                 [] Pet visits [] Devotional reading                         [] Unknown 
   [] Other:                                         
 
 
Interventions offered during this visit: (See comments for more details) Patient Interventions: Initial visit Plan of Care: 
 
 [] Support spiritual and/or cultural needs  
 [] Support AMD and/or advance care planning process    
 [] Support grieving process 
 [] Coordinate Rites and/or Rituals  
 [] Coordination with community clergy [] No spiritual needs identified at this time 
 [] Detailed Plan of Care below (See Comments)  [] Make referral to Music Therapy 
[] Make referral to Pet Therapy    
[] Make referral to Addiction services 
[] Make referral to [facility] 
[] Make referral to Spiritual Care Partner 
[] No future visits requested       
[] Follow up visits as needed Comments: Responded to Code Stroke called for Ms Elsi Pretty who will be admitted to ED-17.  Spoke briefly to patient as EMS were taking her to CT; patient was smiling and appeared relaxed. Also offered brief words of support to patient's caregiver who was with her at that time. Unable to do spiritual assessment due to procedures being done. : Rev. Latricia Cruz. Jc Morales; [facility], to contact [phone] Ihsan Velez call: [phone]

## 2018-09-05 NOTE — IP AVS SNAPSHOT
[facility] [address] [place] [address] 
[phone] Patient: Cathy Ho MRN: [id number] :1945 About your hospitalization You were admitted on:  2018 You last received care in the:  [facility] 6S NEURO-SCI TELE You were discharged on:  2018 Why you were hospitalized Your primary diagnosis was:  Not on File Your diagnoses also included:  Tia (Transient Ischemic Attack), Sudden Severe Confusion Due To Brain Dysfunction Caused By Illness Follow-up Information Follow up With Details Comments Contact Info Mary Phan MD   [address] [facility] [address] [facility] [place]  
[phone] Your Scheduled Appointments 2018  1:20 PM EDT Follow Up with DO Tomy Alicia [facility] at [address] [place] [address]  
[phone] Discharge Orders None A check sandrine indicates which time of day the medication should be taken. My Medications CONTINUE taking these medications Instructions Each Dose to Equal  
 Morning Noon Evening Bedtime  
 atorvastatin 20 mg tablet Commonly known as:  LIPITOR Your last dose was: Your next dose is: TAKE 1 TABLET BY MOUTH EVERY DAY  
     
   
   
   
  
 CALTRATE 600+D PLUS MINERALS 600 mg calcium- 400 unit Tab Generic drug:  Calcium Carbonate-Vit D3-Min Your last dose was: Your next dose is: Take 1 Tab by mouth daily. 1 Tab  
    
   
   
   
  
 clopidogrel 75 mg Tab Commonly known as:  PLAVIX Your last dose was: Your next dose is: Take 1 Tab by mouth daily. 75 mg  
    
   
   
   
  
 divalproex  mg tablet Commonly known as:  DEPAKOTE Your last dose was: Your next dose is: TAKE 2 TABLETS BY MOUTH EVERY 12 HOURS  
     
   
   
   
  
 fish oil-omega-3 fatty acids 340-1,000 mg capsule Your last dose was: Your next dose is: Take 1 Cap by mouth daily. 1 Cap  
    
   
   
   
  
 levothyroxine 50 mcg tablet Commonly known as:  SYNTHROID Your last dose was: Your next dose is: TAKE 1 TAB BY MOUTH DAILY (BEFORE BREAKFAST). sertraline 100 mg tablet Commonly known as:  ZOLOFT Your last dose was: Your next dose is: Take 100 mg by mouth every twelve (12) hours. 100 mg  
    
   
   
   
  
 WOMEN'S MULTIVITAMIN PO Your last dose was: Your next dose is: Take  by mouth. Discharge Instructions Discharge Instructions PATIENT ID: Willette Skiff MRN: [id number] YOB: 1945 DATE OF ADMISSION: 9/5/2018 11:14 AM   
DATE OF DISCHARGE: 9/6/2018 PRIMARY CARE PROVIDER: Brad Carlisle MD  
 
ATTENDING PHYSICIAN: Elizbeth Dakins, MD 
DISCHARGING PROVIDER: Elizbeth Dakins, MD   
To contact this individual call [phone] and ask the  to page. If unavailable ask to be transferred the Adult Hospitalist Department. DISCHARGE DIAGNOSES There was no specific diagnosis for you symptoms of confusion and worsening weakness to your right arm. Head cat scan and brain MRI were negative;EEG without evidence of seizure activity. Its possible you might have suffered transient ischemic attack. We have consulted with neurologist who recommended continuing current home medications. Occupation therapist recommended continuing out therapy. CONSULTATIONS: IP CONSULT TO NEUROLOGY PROCEDURES/SURGERIES: * No surgery found * PENDING TEST RESULTS:  
At the time of discharge the following test results are still pending: none FOLLOW UP APPOINTMENTS:  
Follow-up Information Follow up With Details Comments Contact Info Daria Pfeiffer MD   [address] [facility] [address] [facility] [place]  
[phone] ADDITIONAL CARE RECOMMENDATIONS:  
 
DIET: Cardiac Diet ACTIVITY: Activity as tolerated WOUND CARE: NA 
 
EQUIPMENT needed: NA 
 
 
  
 SNF/Inpatient Rehab/LTAC Independent/assisted living Hospice Other:  
 
 
 
Signed: 1100  Emanuel Macias MD 
9/6/2018 
3:59 PM 
 
 
  
Transient Ischemic Attack: After Your Visit to the Emergency Room Your Care Instructions Even though you have been released from the emergency room, you still need to watch for any problems. The doctor carefully checked you. But sometimes problems can develop later. If you have new symptoms, or if your symptoms do not get better, return to the emergency room or call your doctor right away. In a transient ischemic attack (TIA), the blood flow to your brain is blocked for a short time. You may have had vision problems, slurred speech, or other symptoms that are now gone. A stroke can cause symptoms similar to a TIA, but it causes lasting damage to your brain. A TIA is a warning that you may have a stroke in the future. If you think you are having another TIA, call 911 or seek other emergency help right away. Early treatment can help prevent a stroke. If you have other new symptoms, or if your symptoms do not get better, return to the emergency room or call your doctor right away. Prompt treatment may prevent long-term brain damage caused by a stroke. A visit to the emergency room is only one step in your treatment. Even if you feel better, you still need to do what your doctor recommends, such as going to all suggested follow-up appointments and taking medicines as directed. This will help you recover and help prevent future problems. How can you care for yourself at home? · Know the signs of a TIA or stroke. Ask family members or caregivers to watch for signs of a stroke, and make sure they know what to do if these signs appear. They may notice these signs before you do. · Take your medicines exactly as prescribed. Call your doctor if you think you are having a problem with your medicine. · Your doctor may advise you to take aspirin every day. Aspirin helps prevent strokes in people who have had a TIA. · Do not take any over-the-counter medicines or herbal products without talking to your doctor first. 
When should you call for help? Call 911 if: 
· You have signs of a stroke, even if these symptoms go away after a few minutes. These may include: 
¨ Sudden numbness, paralysis, or weakness in your face, arm, or leg, especially on only one side of your body. ¨ New problems with walking or balance. ¨ Sudden vision changes. ¨ Drooling or slurred speech. ¨ New problems speaking or understanding simple statements, or feeling confused. ¨ A sudden, severe headache that is different from past headaches. · You feel like you are having another TIA. · You have signs of internal bleeding, especially if you are taking medicine to prevent blood clots, such as warfarin (Coumadin) or aspirin. These include: 
¨ You cough up blood. ¨ You vomit blood or what looks like coffee grounds. ¨ You pass maroon or very bloody stools. · You have other symptoms that you think are a medical emergency. Return to the emergency room now if: 
· You have new symptoms that may be related to a TIA, such as falls or trouble swallowing. · You are taking aspirin or other medicine that prevents blood clots, and you have: ¨ Severe vaginal bleeding. You are passing clots of blood and soaking through your usual pads or tampons every hour for 2 or more hours. ¨ New bruises or blood spots under your skin. ¨ A nosebleed. ¨ Bleeding gums when you brush your teeth. ¨ Blood in your urine. Where can you learn more? Go to [employer] K.K..be Enter G722 in the search box to learn more about \"Transient Ischemic Attack: After Your Visit to the Emergency Room. \"  
© 6073-7157 Healthwise, Incorporated. Care instructions adapted under license by [employer] (which disclaims liability or warranty for this information). This care instruction is for use with your licensed healthcare professional. If you have questions about a medical condition or this instruction, always ask your healthcare professional. [place] [address] any warranty or liability for your use of this information. Content Version: 9.4.74796; Last Revised: October 31, 2011 
 
  
   
 
 
 
ACO Transitions of Care Introducing [employer] 508 Alaina Garcia offers a voluntary care coordination program to provide high quality service and care to [facility] fee-for-service beneficiaries. Alexandro Roman was designed to help you enhance your health and well-being through the following services: ? Transitions of Care  support for individuals who are transitioning from one care setting to another (example: Hospital to home). ?  Chronic and Complex Care Coordination  support for individuals and caregivers of those with serious or chronic illnesses or with more than one chronic (ongoing) condition and those who take a number of different medications. If you meet specific medical criteria, a [facility] [address] may call you directly to coordinate your care with your primary care physician and your other care providers. For questions about the [facility] programs, please, contact your physicians office. For general questions or additional information about Accountable Care Organizations: 
Please visit www.medicare.gov/acos. html or call 1-800-MEDICARE (8-658.929.6161) TTY users should call 4-743.845.7688. Introducing [facility] [place] & HE[facility] SERVICES! [employer] introduces Dhf Taxi patient portal. Now you can access parts of your medical record, email your doctor's office, and request medication refills online. 1. In your internet browser, go to [email or web address] 2. Click on the First Time User? Click Here link in the Sign In box. You will see the New Member Sign Up page. 3. Enter your [employer] Access Code exactly as it appears below. You will not need to use this code after youve completed the sign-up process. If you do not sign up before the expiration date, you must request a new code. · [employer] Access Code: [email or web address] Expires: 9/16/2018  9:40 AM 
 
4. Enter the last four digits of your Social Security Number (xxxx) and Date of Birth (mm/dd/yyyy) as indicated and click Submit. You will be taken to the next sign-up page. 5. Create a Tapitt ID. This will be your Dhf Taxi login ID and cannot be changed, so think of one that is secure and easy to remember. 6. Create a Dh[employer] Taxi password. You can change your password at any time. 7. Enter your Password Reset Question and Answer. This can be used at a later time if you forget your password. 8. Enter your e-mail address.  You will receive e-mail notification when new information is available in Everlawt. 9. Click Sign Up. You can now view and download portions of your medical record. 10. Click the Download Summary menu link to download a portable copy of your medical information. If you have questions, please visit the Frequently Asked Questions section of the Everlawt website. Remember, Hango is NOT to be used for urgent needs. For medical emergencies, dial 911. Now available from your iPhone and Android! Introducing Joce Kwong As a [facility] patient, I wanted to make you aware of our electronic visit tool called Joce Kwong. TopFun 24/7 allows you to connect within minutes with a medical provider 24 hours a day, seven days a week via a mobile device or tablet or logging into a secure website from your computer. You can access Joce Kwong from anywhere in the [place]. A virtual visit might be right for you when you have a simple condition and feel like you just dont want to get out of bed, or cant get away from work for an appointment, when your regular [facility] provider is not available (evenings, weekends or holidays), or when youre out of town and need minor care. Electronic visits cost only $49 and if the TellezBlockboard 24/7 provider determines a prescription is needed to treat your condition, one can be electronically transmitted to a nearby pharmacy*. Please take a moment to enroll today if you have not already done so. The enrollment process is free and takes just a few minutes. To enroll, please download the TopFun 24/7 yaz to your tablet or phone, or visit [email or web address] to enroll on your computer. And, as an [address] [place] [address] patient with a [employer] account, the results of your visits will be scanned into your electronic medical record and your primary care provider will be able to view the scanned results. We urge you to continue to see your regular Klarissa Barajas provider for your ongoing medical care. And while your primary care provider may not be the one available when you seek a Queerfeed Mediahunterfin virtual visit, the peace of mind you get from getting a real diagnosis real time can be priceless. For more information on [email or web address], view our Frequently Asked Questions (FAQs) at [email or web address]. Sincerely, 
 
Ellis Moise MD 
Chief Medical Officer Joseph8 Alaina Garcia *:  certain medications cannot be prescribed via [email or web address] Providers Seen During Your Hospitalization Provider Specialty Primary office phone Mahogany Prakash MD Emergency Medicine [phone] Benita Danielle MD Internal Medicine [phone] Your Primary Care Physician (PCP) Primary Care Physician Office Phone Office Fax [address] [facility], [address] [place] [address] [phone] You are allergic to the following Allergen Reactions Rofecoxib Hives Rash Other reaction(s): Hives, Hives Aleve (Naproxen Sodium) Nausea and Vomiting Motrin (Ibuprofen) Nausea and Vomiting Percocet (Oxycodone-Acetaminophen) Other (comments) Other reaction(s): Hallucinations (finding) 
hallucinations Recent Documentation Height Weight Breastfeeding? BMI OB Status Smoking Status 1.778 m 72.3 kg No 22.87 kg/m2 Postmenopausal Never Smoker Emergency Contacts Name Discharge Info Relation Home Work Mobile Wong Carr DISCHARGE CAREGIVER [3] Spouse [3] [phone] BrunoJesus DISCHARGE CAREGIVER [3] Son [22] [phone] Patient Belongings The following personal items are in your possession at time of discharge: 
  Dental Appliances: None  Visual Aid: Glasses, With patient      Home Medications: None   Jewelry: None  Clothing: Pants, Shirt, Socks, With patient (Zamora, Bra)    Other Valuables: None Please provide this summary of care documentation to your next provider. Signatures-by signing, you are acknowledging that this After Visit Summary has been reviewed with you and you have received a copy. Patient Signature:  ____________________________________________________________ Date:  ____________________________________________________________  
  
Ange Hero Provider Signature:  ____________________________________________________________ Date:  ____________________________________________________________

## 2018-09-05 NOTE — PROGRESS NOTES
Speech Path Consult received and appreciated. Chart reviewed. Discussed with RN who reports patient completed dysphagia screen with no difficulty noted. +aphasia. Currently TORRES for testing. Will f/u when available. Alicia Shipman MS, CCC-SLP, BCS-S

## 2018-09-05 NOTE — ROUTINE PROCESS
Bedside and Verbal shift change report given to  Ruby Cranker, RN (oncoming nurse) by Sheri Seals RN (offgoing nurse). Report included the following information SBAR, Kardex, ED Summary, Procedure Summary, Intake/Output, Accordion, Recent Results, Med Rec Status and Cardiac Rhythm NSR. Caren Hooper

## 2018-09-05 NOTE — ED PROVIDER NOTES
HPI  
 
  74y F with hx of prior CVA and residual dysarthria and RUE weakness here with confusion. Was doing some of her daily exercising when she had a period of several minutes where she felt like she was unable to follow directions and felt confused. No other sx's associated with this. After sx's resolved she felt back to baseline. No recent illnesses. No reports of pain. No new weakness or numbness. Past Medical History:  
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  
Procedure Laterality Date  ENDOSCOPY, COLON, DIAGNOSTIC  3/8/2008 ([facility])  HX HEENT    
 HX ORTHOPAEDIC  2001  
 arthroscopic sx cristopher. shoulders after fall [facility]  VASCULAR SURGERY PROCEDURE UNLIST  1975  
 varicose vein stripping Right leg Family History:  
Problem Relation Age of Onset  Stroke Mother 67 Cerebral hemorrhage  Cancer Sister 52 Breast  
 Breast Cancer Sister 52  
 Diabetes Father Social History Social History  Marital status:  Spouse name: N/A  
 Number of children: N/A  
 Years of education: N/A Occupational History  Not on file. Social History Main Topics  Smoking status: Never Smoker  Smokeless tobacco: Never Used  Alcohol use No  
 Drug use: No  
 Sexual activity: Not Currently Partners: Male Other Topics Concern  Not on file Social History Narrative ALLERGIES: Rofecoxib; Aleve [naproxen sodium]; Motrin [ibuprofen]; and Percocet [oxycodone-acetaminophen] Review of Systems Review of Systems Constitutional: (-) weight loss. HEENT: (-) stiff neck Eyes: (-) discharge. Respiratory: (-) cough. Cardiovascular: (-) syncope. Gastrointestinal: (-) blood in stool. Genitourinary: (-) hematuria. Musculoskeletal: (-) myalgias. Neurological: (-) seizure. Skin: (-) petechiae Lymph/Immunologic: (-) enlarged lymph nodes All other systems reviewed and are negative. There were no vitals filed for this visit. Physical Exam Nursing note and vitals reviewed. Constitutional: oriented to person, place, and time. appears well-developed and well-nourished. No distress. Head: Normocephalic and atraumatic. Sclera anicteric Nose: No rhinorrhea Mouth/Throat: Oropharynx is clear and moist. Pharynx normal 
Eyes: Conjunctivae are normal. Pupils are equal, round, and reactive to light. Right eye exhibits no discharge. Left eye exhibits no discharge. Neck: Painless normal range of motion. Neck supple. No LAD. Cardiovascular: Normal rate, regular rhythm, normal heart sounds and intact distal pulses. Exam reveals no gallop and no friction rub. No murmur heard. Pulmonary/Chest:  No respiratory distress. No wheezes. No rales. No rhonchi. No increased work of breathing. No accessory muscle use. Good air exchange throughout. Abdominal: soft, non-tender, no rebound or guarding. No hepatosplenomegaly. Normal bowel sounds throughout. Back: no tenderness to palpation, no deformities, no CVA tenderness Extremities/Musculoskeletal: Normal range of motion. no tenderness. No edema. Distal extremities are neurovasc intact. Lymphadenopathy:   No adenopathy. Neurological:  CN II-XII intact, 5/5 strength throughout aside from 4/5 strength in RUE (old), nl sensation throughout, nl cerebellar function, nl speech/fluency with mild dysarthria (old), nl gait/station, no pronator drift. Normal mental status. Skin: Skin is warm and dry. No rash noted. No pallor. MDM 74y F here with possible TIA. In CT now and neuro will see. ED Course Procedures ED EKG interpretation: 
Rhythm: sinus bradycardia; and regular . Rate (approx.): 59;  Axis: normal; P wave: normal; QRS interval: normal ; ST/T wave: normal;  This EKG was interpreted by Diego Cruz MD,ED Provider. 12:18 PM 
CT ok. Seen by neuro. Plan to admit. TIA vs seizure? Total critical care time (excluding procedures): 35 min

## 2018-09-05 NOTE — ED NOTES
3949 [facility] RN notified regarding pt being transported to MRI and then to the floor; RN verbalized understanding.

## 2018-09-05 NOTE — PROGRESS NOTES
Problem: Pressure Injury - Risk of 
Goal: *Prevention of pressure injury Document Asaf Scale and appropriate interventions in the flowsheet. Outcome: Progressing Towards Goal 
Pressure Injury Interventions: 
Sensory Interventions: Assess changes in LOC Moisture Interventions: Offer toileting Q_hr Activity Interventions: PT/OT evaluation, Increase time out of bed Mobility Interventions: PT/OT evaluation Nutrition Interventions: Document food/fluid/supplement intake Problem: Falls - Risk of 
Goal: *Absence of Falls Document Bishop Hawkins Fall Risk and appropriate interventions in the flowsheet. Outcome: Progressing Towards Goal 
Fall Risk Interventions: 
  
 
  
 
Medication Interventions: Patient to call before getting OOB, Teach patient to arise slowly Elimination Interventions: Call light in reach, Toileting schedule/hourly rounds

## 2018-09-05 NOTE — ROUTINE PROCESS
TRANSFER - OUT REPORT: 
 
Verbal report given to Maura(name) on Christina Hong  being transferred to 6S(unit) for routine progression of care Report consisted of patients Situation, Background, Assessment and  
Recommendations(SBAR). Information from the following report(s) ED Summary and Recent Results was reviewed with the receiving nurse. Lines:  
Peripheral IV 09/05/18 Left Hand (Active) Opportunity for questions and clarification was provided. Patient transported with: 
 Xtime

## 2018-09-05 NOTE — IP AVS SNAPSHOT
[place] [address] [facility] 
[phone] Patient: Jeannie Mylse MRN: [id number] :1945 A check sandrine indicates which time of day the medication should be taken. My Medications CONTINUE taking these medications Instructions Each Dose to Equal  
 Morning Noon Evening Bedtime  
 atorvastatin 20 mg tablet Commonly known as:  LIPITOR Your last dose was: Your next dose is: TAKE 1 TABLET BY MOUTH EVERY DAY  
     
   
   
   
  
 CALTRATE 600+D PLUS MINERALS 600 mg calcium- 400 unit Tab Generic drug:  Calcium Carbonate-Vit D3-Min Your last dose was: Your next dose is: Take 1 Tab by mouth daily. 1 Tab  
    
   
   
   
  
 clopidogrel 75 mg Tab Commonly known as:  PLAVIX Your last dose was: Your next dose is: Take 1 Tab by mouth daily. 75 mg  
    
   
   
   
  
 divalproex  mg tablet Commonly known as:  DEPAKOTE Your last dose was: Your next dose is: TAKE 2 TABLETS BY MOUTH EVERY 12 HOURS  
     
   
   
   
  
 fish oil-omega-3 fatty acids 340-1,000 mg capsule Your last dose was: Your next dose is: Take 1 Cap by mouth daily. 1 Cap  
    
   
   
   
  
 levothyroxine 50 mcg tablet Commonly known as:  SYNTHROID Your last dose was: Your next dose is: TAKE 1 TAB BY MOUTH DAILY (BEFORE BREAKFAST). sertraline 100 mg tablet Commonly known as:  ZOLOFT Your last dose was: Your next dose is: Take 100 mg by mouth every twelve (12) hours. 100 mg  
    
   
   
   
  
 WOMEN'S MULTIVITAMIN PO Your last dose was: Your next dose is: Take  by mouth.

## 2018-09-05 NOTE — ED NOTES
Pt in EEG;  MRI states that they have a transport request in for the patient, who is to come to MRI after EEG; MRI verbalized understanding that the patient is to go to 6S 664.

## 2018-09-05 NOTE — ED TRIAGE NOTES
1123: Patient arrives from home via EMS after caregiver states that patient had \"difficulty getting her words out this morning while she was dancing. She then stopped dancing and said why am I dancing? \" Onset of confusion started 1030 this morning. Pt has prior history of a stroke with right sided deficits. BG-102. NIH - 6 
 
1132: Dr. Josee Messina on monitor. 1224: Bedside and Verbal shift change report given to Linda Hassan RN (oncoming nurse) by Smooth Souza RN (offgoing nurse). Report included the following information SBAR, Kardex, ED Summary, Recent Results and Cardiac Rhythm sinus rhythm.

## 2018-09-05 NOTE — ROUTINE PROCESS
TRANSFER - IN REPORT: 
 
Verbal report received from [facility] (name) on [place]  being received from ED (unit) for change in patient condition(.) Report consisted of patients Situation, Background, Assessment and  
Recommendations(SBAR). Information from the following report(s) SBAR, Kardex, ED Summary, Procedure Summary, Intake/Output, MAR, Recent Results, Med Rec Status, Cardiac Rhythm NSR.  and Pre Procedure Checklist was reviewed with the receiving nurse. Opportunity for questions and clarification was provided. Assessment completed upon patients arrival to unit and care assumed.

## 2018-09-06 VITALS
HEIGHT: 70 IN | SYSTOLIC BLOOD PRESSURE: 109 MMHG | HEART RATE: 70 BPM | RESPIRATION RATE: 19 BRPM | TEMPERATURE: 98.4 F | DIASTOLIC BLOOD PRESSURE: 55 MMHG | WEIGHT: 159.39 LBS | BODY MASS INDEX: 22.82 KG/M2 | OXYGEN SATURATION: 100 %

## 2018-09-06 PROBLEM — G93.89: Status: ACTIVE | Noted: 2018-09-06

## 2018-09-06 PROBLEM — F05: Status: ACTIVE | Noted: 2018-09-06

## 2018-09-06 LAB
CHOLEST SERPL-MCNC: 115 MG/DL
EST. AVERAGE GLUCOSE BLD GHB EST-MCNC: 126 MG/DL
HBA1C MFR BLD: 6 % (ref 4.2–6.3)
HDLC SERPL-MCNC: 68 MG/DL
HDLC SERPL: 1.7 {RATIO} (ref 0–5)
LDLC SERPL CALC-MCNC: 28.8 MG/DL (ref 0–100)
LIPID PROFILE,FLP: NORMAL
TRIGL SERPL-MCNC: 91 MG/DL (ref ?–150)
VALPROATE SERPL-MCNC: 110 UG/ML (ref 50–100)
VLDLC SERPL CALC-MCNC: 18.2 MG/DL

## 2018-09-06 PROCEDURE — 80061 LIPID PANEL: CPT | Performed by: HOSPITALIST

## 2018-09-06 PROCEDURE — G8979 MOBILITY GOAL STATUS: HCPCS

## 2018-09-06 PROCEDURE — 99218 HC RM OBSERVATION: CPT

## 2018-09-06 PROCEDURE — 93306 TTE W/DOPPLER COMPLETE: CPT

## 2018-09-06 PROCEDURE — 97165 OT EVAL LOW COMPLEX 30 MIN: CPT

## 2018-09-06 PROCEDURE — 80164 ASSAY DIPROPYLACETIC ACD TOT: CPT | Performed by: HOSPITALIST

## 2018-09-06 PROCEDURE — G8987 SELF CARE CURRENT STATUS: HCPCS

## 2018-09-06 PROCEDURE — 83036 HEMOGLOBIN GLYCOSYLATED A1C: CPT | Performed by: HOSPITALIST

## 2018-09-06 PROCEDURE — G8980 MOBILITY D/C STATUS: HCPCS

## 2018-09-06 PROCEDURE — G8988 SELF CARE GOAL STATUS: HCPCS

## 2018-09-06 PROCEDURE — 97116 GAIT TRAINING THERAPY: CPT

## 2018-09-06 PROCEDURE — G8978 MOBILITY CURRENT STATUS: HCPCS

## 2018-09-06 PROCEDURE — 97161 PT EVAL LOW COMPLEX 20 MIN: CPT

## 2018-09-06 PROCEDURE — G8989 SELF CARE D/C STATUS: HCPCS

## 2018-09-06 PROCEDURE — 74011250637 HC RX REV CODE- 250/637: Performed by: HOSPITALIST

## 2018-09-06 PROCEDURE — 36415 COLL VENOUS BLD VENIPUNCTURE: CPT | Performed by: HOSPITALIST

## 2018-09-06 RX ADMIN — CLOPIDOGREL BISULFATE 75 MG: 75 TABLET ORAL at 09:34

## 2018-09-06 RX ADMIN — DIVALPROEX SODIUM 500 MG: 500 TABLET, DELAYED RELEASE ORAL at 09:33

## 2018-09-06 RX ADMIN — Medication 10 ML: at 14:05

## 2018-09-06 RX ADMIN — Medication 10 ML: at 06:52

## 2018-09-06 RX ADMIN — LEVOTHYROXINE SODIUM 50 MCG: 50 TABLET ORAL at 06:51

## 2018-09-06 RX ADMIN — SERTRALINE HYDROCHLORIDE 100 MG: 50 TABLET ORAL at 09:34

## 2018-09-06 NOTE — PROGRESS NOTES
Speech pathology Orders received, chart reviewed. Patient with baseline aphasia from old CVA. No reports of changes with language. MRI negative for acute infarct. Patient passed STAND and has been tolerating a regular diet consistency. Formal speech or swallow eval not indicated. Will complete orders.  Tamela Barnes M.S. CCC-SLP

## 2018-09-06 NOTE — PROGRESS NOTES
Occupational therapy [id number] -  
9.6.2018 Orders acknowledged and chart reviewed in prep for OT evaluation. Patient cleared by RN to be seen for evaluation. Entered room, patient currently TORRES, will f/u later in AM/PM for evaluation as able and appropriate. Thank you. Catina Christian MS, OTR/L

## 2018-09-06 NOTE — PROGRESS NOTES
physical Therapy neuro EVALUATION/discharge Patient: Uday Cage (81 y.o. female) Date: 9/6/2018 Primary Diagnosis: TIA (transient ischemic attack) Precautions:   Fall ASSESSMENT : 
Based on the objective data described below, the patient presents at her baseline functional level following admission for c/o AMS. CT and MRI were negative for acute change. 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 Prior to admission, pt reports that she lived at home with her spouse and hired caregiver assist (4 days/wk for 7.5hrs), however was mod I with most ADLs and mobility w/o use of AD. Neuro exam today appears nonfocal for acute change and consistent with all baseline deficits. She was able to complete all mobility at an overall SBA/SUP level including ambulating x300ft in hallway and asc/desc 8 stairs with use of L rail. No overt LOB or difficulty with integration of head movements or functional based tasks. Pt reports (and caregiver confirms) that she is back her her baseline. No further acute care PT indicated at this time, will complete orders and sign off. Recommend discharge home with prior set up and resumption of OP OT. Skilled physical therapy is not indicated at this time. PLAN : 
Discharge Recommendations: Outpatient OT. None for PT Further Equipment Recommendations for Discharge: None SUBJECTIVE:  
Patient stated I feel magnificent and proud of myself.  OBJECTIVE DATA SUMMARY:  
HISTORY:   
Past Medical History:  
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  
Procedure Laterality Date  ENDOSCOPY, COLON, DIAGNOSTIC  3/8/2008 ([facility])  HX HEENT    
 HX ORTHOPAEDIC  2001  
 arthroscopic sx cristopher. shoulders after fall [facility]  VASCULAR SURGERY PROCEDURE UNLIST  1975  
 varicose vein stripping Right leg Prior Level of Function/Home Situation: lives at home with caregiver assist;mod I with ADLs and mobility w/o AD Personal factors and/or comorbidities impacting plan of care:  
 
Home Situation Home Environment: Private residence # Steps to Enter: 4 One/Two Story Residence: Two story # of Interior Steps: 15 Living Alone: No 
Support Systems: Home care staff, Spouse/Significant Other/Partner Patient Expects to be Discharged to[de-identified] Private residence Current DME Used/Available at Home: None Tub or Shower Type: Tub/Shower combination EXAMINATION/PRESENTATION/DECISION MAKING:  
Critical Behavior: 
Neurologic State: Alert Orientation Level: Oriented X4 Cognition: Follows commands Safety/Judgement: Awareness of environment, Insight into deficits Hearing: Auditory Auditory Impairment: None Skin:  Intact where exposed Edema: none noted Range Of Motion: 
AROM: Generally decreased, functional (R UE, baseline) Strength:   
Strength: Generally decreased, functional (R UE; baseline) Tone & Sensation:  
Tone: Abnormal 
Sensation: Impaired Coordination: 
Coordination: Generally decreased, functional (R UE;baseline) Vision:  
Tracking: Able to track stimulus in all quadrants w/o difficulty Diplopia: No 
Acuity: Impaired far vision; Impaired near vision Corrective Lenses: Glasses Functional Mobility: 
Bed Mobility: 
Rolling:  (NT - received in BR, returned to chair) Transfers: 
Sit to Stand: Supervision Stand to Sit: Supervision Balance:  
Sitting: Intact Standing: Intact Ambulation/Gait Training: 
Distance (ft): 300 Feet (ft) Assistive Device: Gait belt Ambulation - Level of Assistance: Contact guard assistance;Supervision Gait Description (WDL): Exceptions to [facility] Stair Training: 
Number of Stairs Trained: 8 Stairs - Level of Assistance: Contact guard assistance Rail Use: Left Functional Measure: 
Laynewton Bowersen Balance Test: 
 
Sitting to Standin Standing Unsupported: 4 Sitting with Back Unsupported: 4 Standing to Sittin Transfers: 4 Standing Unsupported with Eyes Closed: 3 Standing Unsupported with Feet Together: 3 Reach Forward with Outstretched Arm: 3  Object: 4 Turn to Look Over Shoulders: 4 Turn 360 Degrees: 3 Alternate Foot on Step/Stool: 1 Standing Unsupported One Foot in Front: 2 Stand on One Le Total: 45 
  
 
 
56=Maximum possible score;  
0-20=High fall risk 21-40=Moderate fall risk 41-56=Low fall risk Stephen Balance Test and G-code impairment scale: 
Percentage of Impairment CH 
 
0% 
 CI 
 
1-19% CJ 
 
20-39% CK 
 
40-59% CL 
 
60-79% CM 
 
80-99% CN  
 
100% Sage Dong Score 0-56 56 45-55 34-44 23-33 12-22 1-11 0  
 
G codes: In compliance with CMSs Claims Based Outcome Reporting, the following G-code set was chosen for this patient based on their primary functional limitation being treated: The outcome measure chosen to determine the severity of the functional limitation was the Layman Keen with a score of 45/56 which was correlated with the impairment scale. ? Mobility - Walking and Moving Around:  
  - CURRENT STATUS: CI - 1%-19% impaired, limited or restricted  - GOAL STATUS: CI - 1%-19% impaired, limited or restricted  - D/C STATUS:  CI - 1%-19% impaired, limited or restricted Physical Therapy Evaluation Charge Determination History Examination Presentation Decision-Making HIGH Complexity :3+ comorbidities / personal factors will impact the outcome/ POC  LOW Complexity : 1-2 Standardized tests and measures addressing body structure, function, activity limitation and / or participation in recreation  LOW Complexity : Stable, uncomplicated  Other outcome measures stephen  LOW Based on the above components, the patient evaluation is determined to be of the following complexity level: LOW Pain: 
Pain Scale 1: Numeric (0 - 10) Pain Intensity 1: 0 Activity Tolerance:  
NAD Please refer to the flowsheet for vital signs taken during this treatment. After treatment:  
[x]         Patient left in no apparent distress sitting up on toilet with OT beginning session 
[]         Patient left in no apparent distress in bed 
[]         Call bell left within reach [x]         Nursing notified 
[x]         Caregiver present 
[]         Bed alarm activated COMMUNICATION/EDUCATION:  
Patient was educated regarding Her deficit(s) of resolved AMS as this relates to Her diagnosis of r/o CVA. She demonstrated Good understanding as evidenced by nodding. Patient and/or family was verbally educated on the BE FAST acronym for signs/symptoms of CVA and TIA. BE FAST was written on patient's communication board  for visual education and reinforcement. All questions answered with patient indicating good understanding. [x]   Fall prevention education was provided and the patient/caregiver indicated understanding. [x]   Patient/family have participated as able and agree with findings and recommendations. []   Patient is unable to participate in plan of care at this time. Findings and recommendations were discussed with: Occupational Therapist and Registered Nurse Thank you for this referral. 
Keagan Penaloza, PT, DPT Time Calculation: 20 mins

## 2018-09-06 NOTE — PROGRESS NOTES
Care Management Interventions PCP Verified by CM: Yes Semaj Freire MD) Transition of Care Consult (CM Consult):  (None) MyChart Signup: No 
Discharge Durable Medical Equipment: No 
Physical Therapy Consult: Yes Occupational Therapy Consult: Yes Speech Therapy Consult: Yes Current Support Network: Lives with Spouse Confirm Follow Up Transport: Other (see comment) (professional caregiver) Plan discussed with Pt/Family/Caregiver: Yes Freedom of Choice Offered:  (N/A) Discharge Location Discharge Placement: Other: (Home with family and professional caregiver support.) Reason for Admission:   posible TIA/ Confusion History of CVA 2014 with residual aphasia and right sided weakness. RRAT Score:     16 Do you (patient/family) have any concerns for transition/discharge? Plan for utilizing home health:     None. Receives out patient OT with [facility]. Patient's OT has requested outpatient speech referral from patient's PCP. Likelihood of readmission? Moderate. Transition of Care Plan:       
CM met with patient and her professional caregiver. Patient lives with her . Patient has private pay caregivers for 7.5 hours per day/ 4 days per week while  is at work. Patient has 2 supportive sons locally and a son who is a professor at [employer]. Patient reports good family /social support. Patient is ambulatory and expects return to previous level of functioning. Patient confirmed PCP, health insurance, and prescription coverage. Transport at discharge will be provided by professional caregiver. No discharge planning needs presented at this time.

## 2018-09-06 NOTE — DISCHARGE INSTRUCTIONS
Discharge Instructions       PATIENT ID: Willette Skiff  MRN: [id number]   YOB: 1945    DATE OF ADMISSION: 9/5/2018 11:14 AM    DATE OF DISCHARGE: 9/6/2018    PRIMARY CARE PROVIDER: Brad Carlisle MD     ATTENDING PHYSICIAN: Elizbeth Dakins, MD  DISCHARGING PROVIDER: Elizbeth Dakins, MD    To contact this individual call [phone] and ask the  to page. If unavailable ask to be transferred the Adult Hospitalist Department. DISCHARGE DIAGNOSES   There was no specific diagnosis for you symptoms of confusion and worsening weakness to your right arm. Head cat scan and brain MRI were negative;EEG without evidence of seizure activity. Its possible you might have suffered transient ischemic attack. We have consulted with neurologist who recommended continuing current home medications. Occupation therapist recommended continuing out therapy. The Depakote drug level was slightly high and neurologist suggested you KEEP the same dose. Please ask your neurologist to check levels as out patient,if the level is significantly elevated,dose may need to be decreased but make sure that is done by your neurologist.    CONSULTATIONS: IP CONSULT TO NEUROLOGY    PROCEDURES/SURGERIES: * No surgery found *    PENDING TEST RESULTS:   At the time of discharge the following test results are still pending: none    FOLLOW UP APPOINTMENTS:   Follow-up Information     Follow up With Details Comments Contact Info    Brad Carlisle MD   [address] [place] [address] [facility]  [address] [place] [address]  [phone]             ADDITIONAL CARE RECOMMENDATIONS:     DIET: Cardiac Diet    ACTIVITY: Activity as tolerated    WOUND CARE: NA    EQUIPMENT needed: NA      DISCHARGE MEDICATIONS:   See Medication Reconciliation Form    · It is important that you take the medication exactly as they are prescribed.    · Keep your medication in the bottles provided by the pharmacist and keep a list of the medication names, dosages, and times to be taken in your wallet. · Do not take other medications without consulting your doctor. NOTIFY YOUR PHYSICIAN FOR ANY OF THE FOLLOWING:   Fever over 101 degrees for 24 hours. Chest pain, shortness of breath, fever, chills, nausea, vomiting, diarrhea, change in mentation, falling, weakness, bleeding. Severe pain or pain not relieved by medications. Or, any other signs or symptoms that you may have questions about. DISPOSITION:  x  Home With:   OT  PT  HH  RN       SNF/Inpatient Rehab/LTAC    Independent/assisted living    Hospice    Other:         Signed: Magdy Alan MD  9/6/2018  3:59 PM         Transient Ischemic Attack: After Your Visit to the Emergency Room  Your Care Instructions  Even though you have been released from the emergency room, you still need to watch for any problems. The doctor carefully checked you. But sometimes problems can develop later. If you have new symptoms, or if your symptoms do not get better, return to the emergency room or call your doctor right away. In a transient ischemic attack (TIA), the blood flow to your brain is blocked for a short time. You may have had vision problems, slurred speech, or other symptoms that are now gone. A stroke can cause symptoms similar to a TIA, but it causes lasting damage to your brain. A TIA is a warning that you may have a stroke in the future. If you think you are having another TIA, call 911 or seek other emergency help right away. Early treatment can help prevent a stroke. If you have other new symptoms, or if your symptoms do not get better, return to the emergency room or call your doctor right away. Prompt treatment may prevent long-term brain damage caused by a stroke. A visit to the emergency room is only one step in your treatment. Even if you feel better, you still need to do what your doctor recommends, such as going to all suggested follow-up appointments and taking medicines as directed.  This will help you recover and help prevent future problems. How can you care for yourself at home? · Know the signs of a TIA or stroke. Ask family members or caregivers to watch for signs of a stroke, and make sure they know what to do if these signs appear. They may notice these signs before you do. · Take your medicines exactly as prescribed. Call your doctor if you think you are having a problem with your medicine. · Your doctor may advise you to take aspirin every day. Aspirin helps prevent strokes in people who have had a TIA. · Do not take any over-the-counter medicines or herbal products without talking to your doctor first.  When should you call for help? Call 911 if:  · You have signs of a stroke, even if these symptoms go away after a few minutes. These may include:  ¨ Sudden numbness, paralysis, or weakness in your face, arm, or leg, especially on only one side of your body. ¨ New problems with walking or balance. ¨ Sudden vision changes. ¨ Drooling or slurred speech. ¨ New problems speaking or understanding simple statements, or feeling confused. ¨ A sudden, severe headache that is different from past headaches. · You feel like you are having another TIA. · You have signs of internal bleeding, especially if you are taking medicine to prevent blood clots, such as warfarin (Coumadin) or aspirin. These include:  ¨ You cough up blood. ¨ You vomit blood or what looks like coffee grounds. ¨ You pass maroon or very bloody stools. · You have other symptoms that you think are a medical emergency. Return to the emergency room now if:  · You have new symptoms that may be related to a TIA, such as falls or trouble swallowing. · You are taking aspirin or other medicine that prevents blood clots, and you have:  ¨ Severe vaginal bleeding. You are passing clots of blood and soaking through your usual pads or tampons every hour for 2 or more hours. ¨ New bruises or blood spots under your skin.   ¨ A nosebleed. ¨ Bleeding gums when you brush your teeth. ¨ Blood in your urine. Where can you learn more? Go to [employer]  Enter U000 in the search box to learn more about \"Transient Ischemic Attack: After Your Visit to the Emergency Room. \"   © 5811-8655 [employer], [employer]orporated. Care instructions adapted under license by [employer] (which disclaims liability or warranty for this information). This care instruction is for use with your licensed healthcare professional. If you have questions about a medical condition or this instruction, always ask your healthcare professional. [address] any warranty or liability for your use of this information. Content Version: 9.4.41514;  Last Revised: October 31, 2011

## 2018-09-06 NOTE — PROGRESS NOTES
Problem: Discharge Planning Goal: *Discharge to safe environment Outcome: Progressing Towards Goal 
Home with family and professional caregivers support.

## 2018-09-06 NOTE — PROGRESS NOTES
Problem: Pressure Injury - Risk of 
Goal: *Prevention of pressure injury Document Asaf Scale and appropriate interventions in the flowsheet. Outcome: Progressing Towards Goal 
Pressure Injury Interventions: 
Sensory Interventions: Assess changes in LOC, Assess need for specialty bed, Keep linens dry and wrinkle-free, Maintain/enhance activity level, Pressure redistribution bed/mattress (bed type), Discuss PT/OT consult with provider Moisture Interventions: Assess need for specialty bed, Minimize layers Activity Interventions: Increase time out of bed, Pressure redistribution bed/mattress(bed type), PT/OT evaluation Mobility Interventions: HOB 30 degrees or less, Pressure redistribution bed/mattress (bed type), PT/OT evaluation Nutrition Interventions: Document food/fluid/supplement intake Problem: Falls - Risk of 
Goal: *Absence of Falls Document Mathew Hurst Fall Risk and appropriate interventions in the flowsheet. Outcome: Progressing Towards Goal 
Fall Risk Interventions: 
  
 
  
 
Medication Interventions: Patient to call before getting OOB, Teach patient to arise slowly Elimination Interventions: Call light in reach, Patient to call for help with toileting needs, Toileting schedule/hourly rounds

## 2018-09-06 NOTE — PROGRESS NOTES
Stroke Education documented in Patient Education: YES Core Measures Documented in [facility]: 
Risk Factors: YES Warning signs of stroke: YES When to Activate 911: YES Medication Education for Risk Factors: YES Smoking cessation if applicable: YES Written Education Given:  Manisha Barajas Discharge NIH Completed: YES Score: 1 BRAINS: YES Follow Up Appointment Made: YES Date/Time if applicable: 66-9-70 Bedside RN performed patient education and medication education. Discharge concerns initiated and discussed with patient, including clarification on \"who\" assists the patient at their home and instructions for when the home going patient should call their provider after discharge. Opportunity for questions and clarification was provided. Patient receptive to education: YES Patient stated: I am so ready to go home I feel very well Barriers to Education: None Diagnosis Education given:  YES Length of stay: 0 Expected Day of Discharge: 9/6/18 Ask if they have \"Help at Home\" & add to white board? YES Education Day #: 1 Medication Education Given:  YES 
M in the box Medication name: Depakote Pt aware of HCAHPS survey: YES I have reviewed discharge instructions with the patient. The patient verbalized understanding. Patient feels ready for discharge. She was discharged with her caregiver Rustam Roth to home. She has a prescription for Occupational therapy from Dr. Chay Evans. Patient has her belongings clothes and books with her. Her IV was removed.

## 2018-09-06 NOTE — PROGRESS NOTES
Hospitalist Progress Note Keri Painting MD 
     
                             Answering service: [phone] OR [phone] from in house phone Date of Service:  2018 NAME:  Jeannie Myles :  1945 MRN:  [id number] Admission Summary: This is a 81year old female with PMH significant for CVA with residual  aphasia and R arm weakness and seizure came for confusion and worsening R arm weakness. No seizure like activity witnessed by aide and . Reason for follow up:TIA She is up in a chair. She is alert and oriented. Her  just left the unit. Assessment & Plan:  
Transient ischemic attack presented with confusion and worsening of R UE weakness. These symptoms had resolved by the time she got to the ER. She had had multiple ischemic strokes,she had received tPA during the first stroke in  which resulted in hemorrhagic complications. 
-Head CT negative. EEG no epileptogenic activity. MRI and MRA head,negative for ACUTE events. TTE ,neurology eval pending 
-Continue Plavix,lipitor,LDL 28! 
-Pt/OT/Speech therapy consulted 
-Anticipate discharge once they above are completed. Seizure due to stroke: continue Depakote. Depakote level slightly out of range. She had seizure recurrence in the past when Depakote,will keep sasme dose unless recommended by neurologist 
 
Hypothyroidism: continue synthroid Depression,stable,no SI/HI:continue Seroquel. . 
 
 
 
 
 
Diet:cardiac Code status: full DVT prophylaxis: scd Care Plan discussed with: patient Anticipate discharge home today once TTE,therapy and neurology eval are completed. Hospital Problems  Date Reviewed: 2018 Codes Class Noted POA  
 TIA (transient ischemic attack) ICD-10-CM: G45.9 ICD-9-CM: 435.9  2015 Unknown Review of Systems: Pertinent items are noted in HPI. Physical Examination:  
 
 Last 24hrs VS reviewed since prior progress note. Most recent are: 
Visit Vitals  /73 (BP 1 Location: Right arm, BP Patient Position: At rest)  Pulse 65  Temp 97.8 °F (36.6 °C)  Resp 14  
 Ht 5' 10\" (1.778 m)  Wt 72.3 kg (159 lb 6.3 oz)  SpO2 96%  Breastfeeding No  
 BMI 22.87 kg/m2 Constitutional:  No acute distress, cooperative, pleasant   
HEENT: Head is a traumatic, Un icteric sclera. Pink conjunctiva,no erythema or discharge. Oral mucous moist, oropharynx benign. Neck supple, Resp:  CTA bilaterally. No wheezing/rhonchi/rales. No accessory muscle use CV:  Regular rhythm, normal rate, no murmurs, gallops, rubs GI:  Soft, non distended, non tender. normoactive bowel sounds, no hepatosplenomegaly :  No CVA or suprapubic tenderness Skin  :  No erythema,rash,bullae,dipigmentation Musculoskeletal:  R hand spasm. Neurologic:  AAOx3,expressive aphasia,RUW 3/5. Psych:  Good insight, Not anxious nor agitated. Intake/Output Summary (Last 24 hours) at 09/06/18 8059 Last data filed at 09/06/18 0300 Gross per 24 hour Intake              250 ml Output                0 ml Net              250 ml Data Review:  
 Review and/or order of clinical lab test 
Review and/or order of tests in the radiology section of CPT Review and/or order of tests in the medicine section of CPT Labs:  
 
Recent Labs  
   09/05/18 
 1846 WBC  5.5 HGB  13.2 HCT  39.3 PLT  107* Recent Labs  
   09/05/18 
 1213 NA  143  
K  4.1 CL  107 CO2  25 BUN  19  
CREA  1.27* GLU  86  
CA  9.6 Recent Labs  
   09/05/18 
 1213 SGOT  17 ALT  15  
AP  71 TBILI  0.4 TP  8.5* ALB  4.0  
GLOB  4.5* Recent Labs  
   09/05/18 
 1213 INR  1.0 PTP  10.3 APTT  28.3 No results for input(s): FE, TIBC, PSAT, FERR in the last 72 hours.   
No results found for: FOL, RBCF  
 No results for input(s): PH, PCO2, PO2 in the last 72 hours. No results for input(s): CPK, CKNDX, TROIQ in the last 72 hours. No lab exists for component: CPKMB Lab Results Component Value Date/Time Cholesterol, total 115 09/06/2018 03:59 AM  
 HDL Cholesterol 68 09/06/2018 03:59 AM  
 LDL, calculated 28.8 09/06/2018 03:59 AM  
 Triglyceride 91 09/06/2018 03:59 AM  
 CHOL/HDL Ratio 1.7 09/06/2018 03:59 AM  
 
Lab Results Component Value Date/Time Glucose (POC) 121 (H) 09/17/2015 03:19 PM  
 Glucose (POC) 110 (H) 07/26/2015 04:43 PM  
 Glucose (POC) 106 (H) 07/26/2015 04:37 PM  
 
Lab Results Component Value Date/Time Color YELLOW/STRAW 09/05/2018 01:38 PM  
 Appearance CLEAR 09/05/2018 01:38 PM  
 Specific gravity 1.010 09/05/2018 01:38 PM  
 pH (UA) 8.0 09/05/2018 01:38 PM  
 Protein NEGATIVE  09/05/2018 01:38 PM  
 Glucose NEGATIVE  09/05/2018 01:38 PM  
 Ketone NEGATIVE  09/05/2018 01:38 PM  
 Bilirubin NEGATIVE  09/05/2018 01:38 PM  
 Urobilinogen 0.2 09/05/2018 01:38 PM  
 Nitrites NEGATIVE  09/05/2018 01:38 PM  
 Leukocyte Esterase NEGATIVE  09/05/2018 01:38 PM  
 Epithelial cells FEW 03/04/2015 02:27 PM  
 Bacteria NEGATIVE  03/04/2015 02:27 PM  
 WBC 0-4 03/04/2015 02:27 PM  
 RBC 0-5 03/04/2015 02:27 PM  
 
 
 
Medications Reviewed:  
 
Current Facility-Administered Medications Medication Dose Route Frequency  sodium chloride (NS) flush 5-10 mL  5-10 mL IntraVENous Q8H  
 sodium chloride (NS) flush 5-10 mL  5-10 mL IntraVENous PRN  
 acetaminophen (TYLENOL) tablet 650 mg  650 mg Oral Q4H PRN Or  
 acetaminophen (TYLENOL) solution 650 mg  650 mg Per NG tube Q4H PRN Or  
 acetaminophen (TYLENOL) suppository 650 mg  650 mg Rectal Q4H PRN  
 clopidogrel (PLAVIX) tablet 75 mg  75 mg Oral DAILY  levothyroxine (SYNTHROID) tablet 50 mcg  50 mcg Oral 6am  
 atorvastatin (LIPITOR) tablet 20 mg  20 mg Oral QHS  divalproex DR (DEPAKOTE) tablet 500 mg  500 mg Oral Q12H  sertraline (ZOLOFT) tablet 100 mg  100 mg Oral Q12H  
 
______________________________________________________________________ EXPECTED LENGTH OF STAY: - - - 
ACTUAL LENGTH OF STAY:          0 Lurdes Soto MD

## 2018-09-06 NOTE — PROCEDURES
ELECTROENCEPHALOGRAM REPORT     Patient Name: Binh Carlos  : 1945  Age: 81 y.o. Ordering physician: ER  Date of EE2018  Interpreting physician: Nazia Wiley DO      PROCEDURE: EEG. CLINICAL INDICATION: The patient is a 81 y.o. female who is being evaluated for baseline electro cerebral activities and to rule out seizure focus. DESCRIPTION OF THE RECORD:     Throughout the recording the left hemisphere demonstrates low voltage and slowing compared to the right side. No discharges seen. Hyperventilation and photic stimulation were not performed. During the recording the patient did not achieve stage II sleep      INTERPRETATION:     This is an abnormal electroencephalogram showing left-sided slowing suggestive for focal cortical dysfunction which is nonspecific and may be seen in underlying metabolic/infectious/inflammatory/structural processes. No clear focal abnormalities or epileptiform activity was seen. Clinical and imaging correlation recommended.       [address] [place] [address],   Diplomate, American Board of Psychiatry & Neurology (Neurology)

## 2018-09-06 NOTE — DISCHARGE SUMMARY
Discharge Summary       PATIENT ID: Jeanette Buitrago  MRN: [id number]   YOB: 1945    DATE OF ADMISSION: 9/5/2018 11:14 AM    DATE OF DISCHARGE: 9/6/2018  PRIMARY CARE PROVIDER: Simeon Rodriguez MD     ATTENDING PHYSICIAN: Kemal Kc MD  DISCHARGING PROVIDER: Kemal Kc MD    To contact this individual call [phone] and ask the  to page. If unavailable ask to be transferred the Adult Hospitalist Department. CONSULTATIONS: IP CONSULT TO NEUROLOGY    PROCEDURES/SURGERIES: * No surgery found *    ADMITTING [address] [place] [address] COURSE:         Assessment & Plan:   Possible transient ischemic attack presented with confusion and worsening of R UE weakness. These symptoms had resolved by the time she got to the ER. She had had multiple ischemic strokes,she had received tPA during the first stroke in 2014 which resulted in hemorrhagic complications.  -Head CT negative. EEG no epileptogenic activity. MRI and MRA head,negative for ACUTE events. TTE ,unremarkable.  -Continue Plavix,lipitor,LDL 28!  -Out patient Ot recommended  -Discussed with neurologist who recommended to continue current treatment as is and no need to do CUS as pt had CTA head and neck recently. Seizure due to stroke: continue Depakote. Depakote level slightly out of range. She had seizure recurrence in the past when Depakote was changed. Confererd with neurologist who suggested to keep current dose. I have advised patient to get levels checked as out patient. Hypothyroidism: continue synthroid     Depression,stable,no SI/HI:continue Seroquel. Ethelene Gauss                PENDING TEST RESULTS:   At the time of discharge the following test results are still pending: none    FOLLOW UP APPOINTMENTS:    Follow-up Information     Follow up With Details Comments Contact Info    Simeon Rodriguez MD   [address] [place] [address] [facility]  [address] [place] [address]  [phone]             ADDITIONAL CARE RECOMMENDATIONS:     DIET: Cardiac Diet    ACTIVITY: Activity as tolerated    WOUND CARE: NA    EQUIPMENT needed: NA      DISCHARGE MEDICATIONS:  Current Discharge Medication List      START taking these medications    Details   OTHER,NON-FORMULARY, Diagnosis: right upper extremity weakness,hand spasm,stiffness from previous stroke      Occupation therapy: please evaluate and treat. Qty: 1 Each, Refills: 0         CONTINUE these medications which have NOT CHANGED    Details   mv-min/iron/folic/calcium/vitK (WOMEN'S MULTIVITAMIN PO) Take  by mouth.      levothyroxine (SYNTHROID) 50 mcg tablet TAKE 1 TAB BY MOUTH DAILY (BEFORE BREAKFAST). Qty: 90 Tab, Refills: 1      atorvastatin (LIPITOR) 20 mg tablet TAKE 1 TABLET BY MOUTH EVERY DAY  Qty: 90 Tab, Refills: 1      divalproex DR (DEPAKOTE) 250 mg tablet TAKE 2 TABLETS BY MOUTH EVERY 12 HOURS  Refills: 2      sertraline (ZOLOFT) 100 mg tablet Take 100 mg by mouth every twelve (12) hours. clopidogrel (PLAVIX) 75 mg tablet Take 1 Tab by mouth daily. Qty: 30 Tab, Refills: 1      fish oil-omega-3 fatty acids 340-1,000 mg capsule Take 1 Cap by mouth daily. Calcium Carbonate-Vit D3-Min (CALTRATE 600+D PLUS MINERALS) 600 mg calcium- 400 unit tab Take 1 Tab by mouth daily. NOTIFY YOUR PHYSICIAN FOR ANY OF THE FOLLOWING:   Fever over 101 degrees for 24 hours. Chest pain, shortness of breath, fever, chills, nausea, vomiting, diarrhea, change in mentation, falling, weakness, bleeding. Severe pain or pain not relieved by medications. Or, any other signs or symptoms that you may have questions about.     DISPOSITION:   x Home With:   OT  PT  HH  RN       Long term SNF/Inpatient Rehab    Independent/assisted living    Hospice    Other:       PATIENT CONDITION AT DISCHARGE:     Functional status    Poor     Deconditioned    x Independent      Cognition    x Lucid     Forgetful     Dementia      Catheters/lines (plus indication)    Watt     PICC     PEG    x None      Code status    x Full code     DNR      PHYSICAL EXAMINATION AT DISCHARGE:     Visit Vitals    /55 (BP 1 Location: Right arm, BP Patient Position: At rest;Supine)    Pulse 70    Temp 98.4 °F (36.9 °C)    Resp 19    Ht 5' 10\" (1.778 m)    Wt 72.3 kg (159 lb 6.3 oz)    SpO2 100%    Breastfeeding No    BMI 22.87 kg/m2      O2 Device: Room air    Temp (24hrs), Av °F (36.7 °C), Min:97.8 °F (36.6 °C), Max:98.4 °F (36.9 °C)         1901 -  0700  In: 250 [P.O.:250]  Out: -     GENERAL:  Alert, oriented, cooperative, no apparent distress  HEENT:  Normocephalic, atraumatic, non icteric sclerae, non pallor conjuctivae, EOMs intact, PERRLA. NECK: Supple, trachea midline, no adenopathy, no thyromegally or tenderness, no carotid bruit and no JVD. LUNGS:   Vesicular breath sounds bilaterally, no added sounds. HEART:   S1 and S2 well heard,RRR,  no murmur, click, rub or gallop. ABDOMEB:   Soft, non-tender. Normoactive bowel sounds. No masses,  No organomegaly. EXTREMETIES:  Atraumatic, acyanotic, no edema  PULSES: 2+ and symmetric all extremities. SKIN:  No rashes or lesions  NEUROLOGY: Alert and oriented . Aphasic. R UE 3/5. Hand spastic. CHRONIC MEDICAL DIAGNOSES:  Problem List as of 2018  Date Reviewed: 2018          Codes Class Noted - Resolved    Sudden severe confusion due to brain dysfunction caused by illness ICD-10-CM: R41.0  ICD-9-CM: 780.09  2018 - Present        Cerebrovascular accident (CVA) due to thrombosis ([facility] ICD-10-CM: I63.9  ICD-9-CM: 434.01  3/10/2017 - Present    Overview Signed 3/10/2017  4:09 PM by Tammy Zaragoza MD     Right sided weakness.   Admitted to [facility] - 3/1/17 - 3/6/17             Depression ICD-10-CM: F32.9  ICD-9-CM: 466  2016 - Present    Overview Signed 2016  7:55 PM by MD Dr. 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  ICD-9-CM: 130.2  8/12/2015 - Present        Right arm weakness ICD-10-CM: R29.898  ICD-9-CM: 729.89  7/26/2015 - Present        Seizure ([facility] ICD-10-CM: R56.9  ICD-9-CM: 780.39  7/26/2015 - Present    Overview Signed 8/4/2015 10:51 AM by Brad Moreno MD     Hospitalization 7/26-28/2015             Dysarthria following nontraumatic intracerebral hemorrhage ICD-10-CM: I90.132  ICD-9-CM: 438.13  1/19/2015 - Present        Pseudobulbar affect ICD-10-CM: F48.2  ICD-9-CM: 310.81  1/19/2015 - Present    Overview Addendum 12/29/2015  2:30 PM by Brad Moreno MD     Secondary to hemorrhagic cva 1/14. [place] [facility]             CVA (cerebral infarction) ICD-10-CM: I63.9  ICD-9-CM: 434.91  1/12/2014 - Present    Overview Addendum 12/29/2015  2:30 PM by Brad Moreno MD     1/2014. Left parietal and superior temporal hemorrhagic cva.   Hospitalization and rehab - speech therapy  [facility], Dr. Xenia Groves             Vitamin D deficiency ICD-10-CM: E55.9  ICD-9-CM: 268.9  8/2/2013 - Present    Overview Signed 8/2/2013  1:58 PM by Brad Moreno MD     Ergocalciferol course 8/2/2013               S/P left cataract extraction ICD-10-CM: H55.37  ICD-9-CM: V45.61  7/6/2012 - Present    Overview Signed 7/6/2012  4:04 PM by MD Dr. Kayla Salazar, 3/5/12             Elevated homocysteine ([facility] ICD-10-CM: E72.11  ICD-9-CM: 270.4  2/2/2012 - Present        S/P arthroscopy of shoulder ICD-10-CM: J03.541  ICD-9-CM: V45.89  2/2/2012 - Present    Overview Signed 2/2/2012  9:44 AM by Brad Moreno MD     Bilateral, 2001, due to fall             Colon cancer screening (Chronic) ICD-10-CM: Z12.11  ICD-9-CM: V76.51  2/2/2012 - Present    Overview Signed 2/2/2012  9:45 AM by MD Dr. Yandy Salazar, 3/6/08, normal             History of screening mammography (Chronic) ICD-10-CM: Z92.89  ICD-9-CM: V15.89  2/2/2012 - Present    Overview Addendum 7/22/2018 12:15 PM by Brad Moreno MD     [facility],7/20/18             Pap smear for cervical cancer screening (Chronic) ICD-10-CM: Z12.4  ICD-9-CM: V76.2  2/2/2012 - Present    Overview Addendum 7/30/2013  8:29 AM by MD Dr. 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 Treated at [place], [place]                     Signed:    Elizbeth Dakins, MD  9/6/2018  4:06 PM

## 2018-09-06 NOTE — H&P
[facility] HISTORY AND PHYSICAL Aisha Clayton 
MR#: [id number] : 1945 ACCOUNT #: [de-identified] ADMIT DATE: 2018 HISTORY OF PRESENT ILLNESS:  This is a 68-year-old black female with a significant past medical history of ischemic stroke with right-sided upper extremity weakness, and aphasia, expressive; and complicated subsequently with seizure disorder, presented to the emergency room for acute onset of confusion. Patient has expressive aphasia. The abundance of the history was obtained from her , and I have also reviewed and summarized records. According to her  and her aide who is working with her, she had the TV on, and when music came on the patient got up to dance with the TV, but during that time she appeared confused and she asked her aide what she was doing. She stated her right arm felt weaker; that is the weak side. Her speech is unchanged, her gait was unchanged. She was able to walk. Due to these concerns, she was brought to the emergency room. Both aide and  said there was no seizure-like activity. In the ED, her workup was fairly unremarkable. Teleneurology were consulted.  is telling me he refused TPA. Patient had TPA during her 1st ischemic stroke, her  said. It was complicated by hemorrhage, and he feels the residual weakness was a complication of the TPA, and he would like no TPA included in her chart. Urine examination was negative. Serum chemistry is quite unremarkable. Currently a creatine of 1.27. A noncontrasted CT of the head was negative. She had CT angiogram on 08/10, head and neck, that showed chronic web-like intraluminal structure, origin left internal carotid, that was unchanged. She had left posterior frontal and parietal junction old infarcts.   At the time of this dictation, MRI of the brain is already completed, and there is no intracranial mass, hemorrhage, or evidence of acute infarct. There is a large area of chronic infarction in the posterior left frontal and parietal lobes. MR angiogram:  No major vessel occlusion, flow-limiting stenosis, or intracranial aneurysm. MEDICATIONS:  She takes Plavix. She has taken this morning's dose. Other medications include Lipitor, Depakote, Synthroid, Zoloft. PAST MEDICAL HISTORY:  Stroke, right hemiparesis, expressive aphasia, depression, seizure, hypothyroidism. SOCIAL HISTORY:  , lives with her . Never smoked. No alcohol or drugs. FAMILY MEDICAL HISTORY:  Mother had cerebral hemorrhage, sister has breast cancer, and father had diabetes. REVIEW OF SYSTEMS:  Expressive aphasia, right arm weakness, chronic; no other symptoms. Patient has expressive aphasia, as witnessed by her  and aide, is at baseline. PHYSICAL EXAMINATION: 
GENERAL:  Patient is alert present, with expressive aphasia, but trying her best to express herself. VITAL SIGNS:  Temperature 98, pulse 57, blood pressure 122/68, respirations 17, oxygen saturation of 95%. HEENT:  Head is atraumatic. No pallor or jaundice. LUNGS:  Not tachypneic. Breath sounds are symmetrical on both anterior and posterior lung fields. CARDIOVASCULAR:  No carotid bruit. S1, S2 regular. No gallop or murmur. ABDOMEN:  Soft, nontender. No hepato and splenomegaly. EXTREMITIES:  No edema. Right hand is spastic. CENTRAL NERVOUS SYSTEM:  She is alert and oriented. Cranial nerves:  She has expressive aphasia. Motor:  Right upper extremity is 3/5. Sensation:  Light touch sensation is diminished. Lower extremity power is 5/5 symmetrical. 
 
ASSESSMENT AND PLAN: 
1. Transient ischemic attack, questionable, possible seizure with transient confusion. Patient had multiple strokes. She is at high risk. She will be admitted to neuro unit for observation and workup.   N.p.o. pending dysphagia screen. She had her 1st dose of Plavix; that will be continued, Lipitor will be continued. Workup MRI is completed, and MRA is completed -- unremarkable except the chronic findings. Will check A1c, lipids, and echocardiogram.  Fall precautions. Physical therapy and Occupational Therapy will be consulted, as well as Speech Therapy. Her  wants us to clearly document that no matter what, in the future he would not want her receive TPA, even if she could be a candidate, as he feels most of the complication following her previous ischemic stroke was from the TPA administered. 2.  Seizure due to stroke. Patient is on Depakote; that will be continued. The seizure medications were temporarily stopped for about 2 weeks for about 1-1/2 years, and subsequently she developed seizure, and she is back on them. 3.  Depression. She is on Seroquel 4. Hypothyroidism. Continue Synthroid. 5.  Prophylaxis:  Gastrointestinal:  No indication. Deep vein thrombosis:  SCD. 6.  Advance care planning was discussed. PATIENT'S  WISHED FOR HER TO BE FULL CODE. DISPOSITION:  If the patient remains neurologically stable, and after completion of workup, she could possibly be discharged tomorrow. Neurology is consulted. MD CHANTALE Mcknight/SOURAV 
D: 09/05/2018 18:52 T: 09/05/2018 21:36 
JOB #: [id number]

## 2018-09-06 NOTE — CONSULTS
Neurology Progress Note    Patient ID:  Everlyn Nissen  [id number]  68 y.o.  1945    Chief Complaint: Sudden confusion    Subjective:     Ms. Enrrique Villalobos is a 32-year-old woman known to me in the neurology clinic for her history of left hemispheric infarct and subsequent seizure disorder who came to the hospital yesterday emergently because of the development of new symptoms. Her caregiver who is with her 5 days a week and knows her very well tells me that yesterday after having breakfast the patient's had too much pepper on her breakfast and had some hyperventilation temporarily. Once that calmed down she went on about her morning and he did some dancing in the living room and during that time the patient recalls having sudden severe pain in the right arm and immediately subsequent to that she had a little bit of confusion. It did not sound like she had any distinctly new deficits. She was brought to the hospital on as a precaution. She takes Plavix daily. She feels her baseline currently. Objective:       ROS:  Motor aphasia  Cannot obtain secondary to patient medical condition      Meds:  Current Facility-Administered Medications   Medication Dose Route Frequency    sodium chloride (NS) flush 5-10 mL  5-10 mL IntraVENous Q8H    sodium chloride (NS) flush 5-10 mL  5-10 mL IntraVENous PRN    acetaminophen (TYLENOL) tablet 650 mg  650 mg Oral Q4H PRN    Or    acetaminophen (TYLENOL) solution 650 mg  650 mg Per NG tube Q4H PRN    Or    acetaminophen (TYLENOL) suppository 650 mg  650 mg Rectal Q4H PRN    clopidogrel (PLAVIX) tablet 75 mg  75 mg Oral DAILY    levothyroxine (SYNTHROID) tablet 50 mcg  50 mcg Oral 6am    atorvastatin (LIPITOR) tablet 20 mg  20 mg Oral QHS    divalproex DR (DEPAKOTE) tablet 500 mg  500 mg Oral Q12H    sertraline (ZOLOFT) tablet 100 mg  100 mg Oral Q12H       MRI Results (maximum last 3):     Results from [place] encounter on 09/05/18   [address] CONT   Narrative Clinical history: patient had \"difficulty getting her words out this morning  while she was dancing. She then stopped dancing and said why am I dancing? \"  Onset of confusion started 1030 this morning. Pt has prior history of a stroke  with right sided deficits. BG-102. ? NIH - 6    INDICATION: See above    COMPARISON:  8/10/2018, 7/27/2015    TECHNIQUE:      TECHNIQUE: MR examination of the brain includes axial and sagittal T1  pre-and-post contrast, axial T2, axial FLAIR, axial gradient echo, axial DWI,  coronal T1 postcontrast.  CONTRAST: The patient was then administered gadolinium-based contrast material  axial and coronal T1-weighted postcontrast enhanced imaging was obtained. Next, 3-D time-of-flight MRA of the brain was performed. Multiplanar  reconstructions were obtained. FINDINGS:   There is no intracranial mass, hemorrhage or evidence of acute infarction. Large area of chronic infarction in the posterior left frontal and parietal  lobes. Ex vacuo dilatation of the left lateral ventricle. Chronic hemosiderin  deposition. No new midline shift or mass effect  There is no Chiari or syrinx. The pituitary and infundibulum are grossly  unremarkable. There is no skull base mass. Cerebellopontine angles are grossly  unremarkable. The cavernous sinuses are symmetric. Optic chiasm and infundibulum  grossly unremarkable. Orbits are grossly symmetric. Dural venous sinuses are  grossly patent. The brain architecture is normal. There are no extra-axial fluid  collections. Vertebral arteries are patent. Basilar artery is patent. P1 segments are  patent. A 2 segments are patent. M1 segments are patent. Proximal M2 vessels on  the right are patent. Attenuation of M2 vessels on the left. Large area of  chronic infarction in the posterior left frontal and parietal lobes. Ex vacuo  dilatation of the left lateral ventricle. . The basilar artery and its branches  are normal. The internal carotid, anterior cerebral, and middle cerebral  arteries are patent. Impression IMPRESSION:  No major vessel occlusion, flow limiting stenosis or intracranial aneurysm. Attenuated M2 branch vessels on the left extending to region of large area of  remote infarction in the posterior left frontal, and inferior left parietal  lobes. Chronic findings related to left frontoparietal infarction. MRI BRAIN W WO CONT   Narrative Clinical history: patient had \"difficulty getting her words out this morning  while she was dancing. She then stopped dancing and said why am I dancing? \"  Onset of confusion started 1030 this morning. Pt has prior history of a stroke  with right sided deficits. BG-102. ? NIH - 6    INDICATION: See above    COMPARISON:  8/10/2018, 7/27/2015    TECHNIQUE:      TECHNIQUE: MR examination of the brain includes axial and sagittal T1  pre-and-post contrast, axial T2, axial FLAIR, axial gradient echo, axial DWI,  coronal T1 postcontrast.  CONTRAST: The patient was then administered gadolinium-based contrast material  axial and coronal T1-weighted postcontrast enhanced imaging was obtained. Next, 3-D time-of-flight MRA of the brain was performed. Multiplanar  reconstructions were obtained. FINDINGS:   There is no intracranial mass, hemorrhage or evidence of acute infarction. Large area of chronic infarction in the posterior left frontal and parietal  lobes. Ex vacuo dilatation of the left lateral ventricle. Chronic hemosiderin  deposition. No new midline shift or mass effect  There is no Chiari or syrinx. The pituitary and infundibulum are grossly  unremarkable. There is no skull base mass. Cerebellopontine angles are grossly  unremarkable. The cavernous sinuses are symmetric. Optic chiasm and infundibulum  grossly unremarkable. Orbits are grossly symmetric. Dural venous sinuses are  grossly patent. The brain architecture is normal. There are no extra-axial fluid  collections.     Vertebral arteries are patent. Basilar artery is patent. P1 segments are  patent. A 2 segments are patent. M1 segments are patent. Proximal M2 vessels on  the right are patent. Attenuation of M2 vessels on the left. Large area of  chronic infarction in the posterior left temporal lobe and inferior left  parietal and frontal lobes. Ex vacuo dilatation of the left lateral ventricle. .  The basilar artery and its branches are normal. The internal carotid, anterior  cerebral, and middle cerebral arteries are patent. Impression IMPRESSION:  No major vessel occlusion, flow limiting stenosis or intracranial aneurysm. Attenuated M2 branch vessels on the left extending to region of large area  remote infarction in the posterior left frontal, and inferior left parietal  lobes. Chronic findings related to left frontal parietal infarction.       Results from [facility] encounter on 05/04/17   MRI BRAIN W WO CONT    Lab Review   Recent Results (from the past 24 hour(s))   EKG, 12 LEAD, INITIAL    Collection Time: 09/05/18 12:10 PM   Result Value Ref Range    Ventricular Rate 59 BPM    Atrial Rate 59 BPM    P-R Interval 136 ms    QRS Duration 80 ms    Q-T Interval 422 ms    QTC Calculation (Bezet) 417 ms    Calculated P Axis 64 degrees    Calculated R Axis 22 degrees    Calculated T Axis 39 degrees    Diagnosis       Sinus bradycardia  When compared with ECG of 26-JUL-2015 16:39,  No significant change was found  Confirmed by Jayesh Lezama MD, [facility] ([id number]) on 9/5/2018 2:04:41 PM     PROTHROMBIN TIME + INR    Collection Time: 09/05/18 12:13 PM   Result Value Ref Range    INR 1.0 0.9 - 1.1      Prothrombin time 10.3 9.0 - 49.7 sec   METABOLIC PANEL, COMPREHENSIVE    Collection Time: 09/05/18 12:13 PM   Result Value Ref Range    Sodium 143 136 - 145 mmol/L    Potassium 4.1 3.5 - 5.1 mmol/L    Chloride 107 97 - 108 mmol/L    CO2 25 21 - 32 mmol/L    Anion gap 11 5 - 15 mmol/L    Glucose 86 65 - 100 mg/dL    BUN 19 6 - 20 MG/DL    Creatinine 1.27 (H) 0.55 - 1.02 MG/DL    BUN/Creatinine ratio 15 12 - 20      GFR est AA 50 (L) >60 ml/min/1.73m2    GFR est non-AA 42 (L) >60 ml/min/1.73m2    Calcium 9.6 8.5 - 10.1 MG/DL    Bilirubin, total 0.4 0.2 - 1.0 MG/DL    ALT (SGPT) 15 12 - 78 U/L    AST (SGOT) 17 15 - 37 U/L    Alk. phosphatase 71 45 - 117 U/L    Protein, total 8.5 (H) 6.4 - 8.2 g/dL    Albumin 4.0 3.5 - 5.0 g/dL    Globulin 4.5 (H) 2.0 - 4.0 g/dL    A-G Ratio 0.9 (L) 1.1 - 2.2     PTT    Collection Time: 09/05/18 12:13 PM   Result Value Ref Range    aPTT 28.3 22.1 - 32.0 sec    aPTT, therapeutic range     58.0 - 77.0 SECS   SAMPLES BEING HELD    Collection Time: 09/05/18  1:38 PM   Result Value Ref Range    SAMPLES BEING HELD 1RED     COMMENT        Add-on orders for these samples will be processed based on acceptable specimen integrity and analyte stability, which may vary by analyte. URINE CULTURE HOLD SAMPLE    Collection Time: 09/05/18  1:38 PM   Result Value Ref Range    Urine culture hold        URINE ON HOLD IN MICROBIOLOGY DEPT FOR 3 DAYS. IF UNPRESERVED URINE IS SUBMITTED, IT CANNOT BE USED FOR ADDITIONAL TESTING AFTER 24 HRS, RECOLLECTION WILL BE REQUIRED.    URINALYSIS W/ RFLX MICROSCOPIC    Collection Time: 09/05/18  1:38 PM   Result Value Ref Range    Color YELLOW/STRAW      Appearance CLEAR CLEAR      Specific gravity 1.010 1.003 - 1.030      pH (UA) 8.0 5.0 - 8.0      Protein NEGATIVE  NEG mg/dL    Glucose NEGATIVE  NEG mg/dL    Ketone NEGATIVE  NEG mg/dL    Bilirubin NEGATIVE  NEG      Blood NEGATIVE  NEG      Urobilinogen 0.2 0.2 - 1.0 EU/dL    Nitrites NEGATIVE  NEG      Leukocyte Esterase NEGATIVE  NEG     VALPROIC ACID    Collection Time: 09/05/18  6:46 PM   Result Value Ref Range    Valproic acid 110 (H) 50 - 100 ug/ml   CBC WITH AUTOMATED DIFF    Collection Time: 09/05/18  6:46 PM   Result Value Ref Range    WBC 5.5 3.6 - 11.0 K/uL    RBC 4.53 3.80 - 5.20 M/uL    HGB 13.2 11.5 - 16.0 g/dL    HCT 39.3 35.0 - 47.0 %    MCV 86.8 80.0 - 99.0 FL    MCH 29.1 26.0 - 34.0 PG    MCHC 33.6 30.0 - 36.5 g/dL    RDW 14.3 11.5 - 14.5 %    PLATELET 134 (L) 850 - 400 K/uL    MPV 12.9 8.9 - 12.9 FL    NRBC 0.0 0  WBC    ABSOLUTE NRBC 0.00 0.00 - 0.01 K/uL    NEUTROPHILS 60 32 - 75 %    LYMPHOCYTES 26 12 - 49 %    MONOCYTES 12 5 - 13 %    EOSINOPHILS 1 0 - 7 %    BASOPHILS 0 0 - 1 %    IMMATURE GRANULOCYTES 0 0.0 - 0.5 %    ABS. NEUTROPHILS 3.3 1.8 - 8.0 K/UL    ABS. LYMPHOCYTES 1.5 0.8 - 3.5 K/UL    ABS. MONOCYTES 0.7 0.0 - 1.0 K/UL    ABS. EOSINOPHILS 0.0 0.0 - 0.4 K/UL    ABS. BASOPHILS 0.0 0.0 - 0.1 K/UL    ABS. IMM. GRANS. 0.0 0.00 - 0.04 K/UL    DF AUTOMATED     LIPID PANEL    Collection Time: 09/06/18  3:59 AM   Result Value Ref Range    LIPID PROFILE          Cholesterol, total 115 <200 MG/DL    Triglyceride 91 <150 MG/DL    HDL Cholesterol 68 MG/DL    LDL, calculated 28.8 0 - 100 MG/DL    VLDL, calculated 18.2 MG/DL    CHOL/HDL Ratio 1.7 0 - 5.0     HEMOGLOBIN A1C WITH EAG    Collection Time: 09/06/18  3:59 AM   Result Value Ref Range    Hemoglobin A1c 6.0 4.2 - 6.3 %    Est. average glucose 126 mg/dL   VALPROIC ACID    Collection Time: 09/06/18  3:59 AM   Result Value Ref Range    Valproic acid 110 (H) 50 - 100 ug/ml       Additional comments:I reviewed the patient's new clinical lab test results. and I reviewed the patients new imaging test results. Patient Vitals for the past 8 hrs:   BP Temp Pulse Resp SpO2   09/06/18 0700 120/73 97.8 °F (36.6 °C) 65 14 96 %          09/04 1901 - 09/06 0700  In: 250 [P.O.:250]  Out: -     Exam:  Visit Vitals    /73 (BP 1 Location: Right arm, BP Patient Position: At rest)    Pulse 65    Temp 97.8 °F (36.6 °C)    Resp 14    Ht 5' 10\" (1.778 m)    Wt 72.3 kg (159 lb 6.3 oz)    SpO2 96%    Breastfeeding No    BMI 22.87 kg/m2     Gen: Well developed  CV: RRR  Lungs: non labored breathing  Abd: soft, non distended  Neuro: Awake and alert. Follows commands. Very pleasant and talkative. Nonfluent aphasia noted. 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    S/P arthroscopy of shoulder Z98.890    Colon cancer screening Z12.11    History of screening mammography Z92.89    Pap smear for cervical cancer screening Z12.4    S/P left cataract extraction Z98.42    Vitamin D deficiency E55.9    CVA (cerebral infarction) I63.9    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 Difficult to call this clearly a TIA. MRI does not show any acute stroke which is reassuring. Complete the workup ongoing. No change to her medications. Once she is medically cleared I anticipate she can be discharged and she will follow-up with me as an outpatient. During this evaluation, we also discussed stroke education to include signs and symptoms of stroke and TIA.        Signed:  [address] [facility], DO  9/6/2018  11:25 AM

## 2018-09-10 ENCOUNTER — PATIENT OUTREACH (OUTPATIENT)
Dept: INTERNAL MEDICINE CLINIC | Age: 73
End: 2018-09-10

## 2018-09-10 RX ORDER — ATORVASTATIN CALCIUM 20 MG/1
TABLET, FILM COATED ORAL
Qty: 90 TAB | Refills: 1 | Status: SHIPPED | OUTPATIENT
Start: 2018-09-10 | End: 2019-03-08 | Stop reason: SDUPTHER

## 2018-09-10 NOTE — PROGRESS NOTES
Hospital Discharge Follow-Up Date/Time:  9/10/2018 12:48 PM 
 
Patient was admitted to [facility] on 18 and discharged on 18 for TIA. The physician discharge summary was available at the time of outreach. Patient was contacted within two business days of discharge. Top Challenges reviewed with the provider TIA - previous CVAs with residual R sided weakness and dysarthria Cr elevated 1.27 at d/c. Valproic Acid elevated: 110 Brain MRI-No major vessel occlusion, flow limiting stenosis or intracranial aneurysm. Attenuated M2 branch vessels on the left extending to region of large area remote infarction in the posterior left frontal, and inferior left parietal 
lobes. Chronic findings related to left frontal parietal infarction MRA-No major vessel occlusion, flow limiting stenosis or intracranial aneurysm. Attenuated M2 branch vessels on the left extending to region of large area of 
remote infarction in the posterior left frontal, and inferior left parietal 
lobes. Chronic findings related to left frontoparietal infarction. Recommendation: OP OT Pt to resume at [address] [place] [address]. Offered referral for speech therapy; declined by  who reports patient becomes very overwhelmed when participating in too many therapies. Method of communication with provider :face to face Inpatient RRAT score: 18 Was this a readmission? no  
Patient stated reason for the readmission: n/a Nurse Navigator (NN) contacted the family by telephone to perform post hospital discharge assessment. Verified name and  with family as identifiers. Provided introduction to self, and explanation of the Nurse Navigator role. Reviewed discharge instructions and red flags with family who verbalized understanding. Family given an opportunity to ask questions and does not have any further questions or concerns at this time.  The family agrees to contact the PCP office for questions related to their healthcare. NN provided contact information for future reference. Disease Specific:   N/A Summary of patient's top problems: 1. Follow up with neuro 10/2/18 - message sent to inquire if appt should be moved up. 2. Lab - elevated Cr (pt has appt with Dr. Mckeon-urology in October Home Health orders at discharge: none [address]: n/a Date of initial visit: n/a Durable Medical Equipment ordered/company: n/a Durable Medical Equipment received: n/a Barriers to care? lack of knowledge about disease Advance Care Planning:  
Does patient have an Advance Directive:  reviewed and current Medication(s):  
New Medications at Discharge: none Changed Medications at Discharge: none Discontinued Medications at Discharge: none Medication reconciliation was performed with family, who verbalizes understanding of administration of home medications. There were no barriers to obtaining medications identified at this time. Referral to Pharm D needed: no  
 
Current Outpatient Prescriptions Medication Sig  
 OTHER,NON-FORMULARY, Diagnosis: right upper extremity weakness,hand spasm,stiffness from previous stroke Occupation therapy: please evaluate and treat.  mv-min/iron/folic/calcium/vitK (WOMEN'S MULTIVITAMIN PO) Take  by mouth.  levothyroxine (SYNTHROID) 50 mcg tablet TAKE 1 TAB BY MOUTH DAILY (BEFORE BREAKFAST).  divalproex DR (DEPAKOTE) 250 mg tablet TAKE 2 TABLETS BY MOUTH EVERY 12 HOURS  sertraline (ZOLOFT) 100 mg tablet Take 100 mg by mouth every twelve (12) hours.  clopidogrel (PLAVIX) 75 mg tablet Take 1 Tab by mouth daily.  fish oil-omega-3 fatty acids 340-1,000 mg capsule Take 1 Cap by mouth daily.  Calcium Carbonate-Vit D3-Min (CALTRATE 600+D PLUS MINERALS) 600 mg calcium- 400 unit tab Take 1 Tab by mouth daily.   
 atorvastatin (LIPITOR) 20 mg tablet TAKE 1 TABLET BY MOUTH EVERY DAY  
 
 No current facility-administered medications for this visit. There are no discontinued medications. [facility] follow up appointment(s):  
Future Appointments Date Time Provider Dave Jaramillo 10/2/2018 1:20 PM DO BENI PalmaDEION REDMAN VIOLA  
1/7/2019 11:40 AM Mary Phan MD [facility] [address] Non-[facility] follow up appointment(s): Dr. Sid Gamboa ([facility] urology) in October;  could not provided exact date. [facility]:  n/a  
 
 
Goals  Attends follow-up appointments as directed. 9/10/18 Patient has f/u with neuro 10/5/18. Will need to recheck valproic acid level. Mr. Sriram Lloyd requested this writer to inquire if neuro appt needs to be pushed up. Waiting on reply from neuro. Mr. Sriram Lloyd requesting to schedule f/u appt with PCP after this week. Pt has f/u with Dr. Sdi Gamboa ([facility] urology 10/2018). kg 
  
  Returns to baseline activity level. 9/10/18 - Patient returned to baseline while in the ED. Residual right hand weakness and dysarthria.  states patient will resume OP OT with [facility]. Does not want referral for speech therapy at this time. Mr. Sriram Lloyd explained that patient becomes overwhelmed at times when she is involved in too many things at one time. Agreed Mr. Sriram Lloyd will request speech therapy once OT is complete. Patient has an aid in the home while  works part-time. Patient continues to work on hand exercises and reading.  kg

## 2018-09-10 NOTE — TELEPHONE ENCOUNTER
Spoke with patient's .  He would like a refill on Depakote 250 mg (takes 2 tabs every 12 hours) sent to [facility].

## 2018-09-10 NOTE — TELEPHONE ENCOUNTER
----- Message from Agnes Aase sent at 9/10/2018 12:47 PM EDT -----  Regarding: Dr Young/rx  Pt's  [phone], (c)  [phone] would like to know if Dr Dwayne Sprague can put in a rx request from a medication she use to get from [facility] for Divalproex for he seizures, for her [facility] [phone].

## 2018-09-11 RX ORDER — DIVALPROEX SODIUM 500 MG/1
500 TABLET, DELAYED RELEASE ORAL 2 TIMES DAILY
Qty: 180 TAB | Refills: 3 | Status: SHIPPED | OUTPATIENT
Start: 2018-09-11 | End: 2019-06-24 | Stop reason: SDUPTHER

## 2018-09-11 NOTE — TELEPHONE ENCOUNTER
Spoke with pts caregiver Mercy Boone) and advised that this Rx was sent in. Advised to note that it is 500mg and she only needs to take one tablet twice a day. Ms Magda Her expressed understanding of this information.

## 2018-09-14 RX ORDER — DIVALPROEX SODIUM 250 MG/1
TABLET, DELAYED RELEASE ORAL
Qty: 120 TAB | Refills: 2 | Status: SHIPPED | OUTPATIENT
Start: 2018-09-14 | End: 2019-06-24 | Stop reason: DRUGHIGH

## 2018-09-17 ENCOUNTER — PATIENT OUTREACH (OUTPATIENT)
Dept: INTERNAL MEDICINE CLINIC | Age: 73
End: 2018-09-17

## 2018-09-17 NOTE — PROGRESS NOTES
Called and LVM with my contact information and instructions to return my call. Will need to advise: per Dr. Porter Spears, patient's scheduled appointment date and time does not need to be moved up. (10/2/18 @1:20). Will schedule PCP appt with  Candance Miguelon (confirmed on [facility])

## 2018-10-15 ENCOUNTER — OFFICE VISIT (OUTPATIENT)
Dept: NEUROLOGY | Age: 73
End: 2018-10-15

## 2018-10-15 VITALS
RESPIRATION RATE: 18 BRPM | WEIGHT: 160 LBS | DIASTOLIC BLOOD PRESSURE: 60 MMHG | BODY MASS INDEX: 22.9 KG/M2 | HEART RATE: 75 BPM | SYSTOLIC BLOOD PRESSURE: 120 MMHG | OXYGEN SATURATION: 98 % | HEIGHT: 70 IN

## 2018-10-15 DIAGNOSIS — R56.9 SEIZURE (HCC): ICD-10-CM

## 2018-10-15 DIAGNOSIS — I65.22 STENOSIS OF LEFT CAROTID ARTERY: ICD-10-CM

## 2018-10-15 DIAGNOSIS — F32.A DEPRESSION, UNSPECIFIED DEPRESSION TYPE: ICD-10-CM

## 2018-10-15 DIAGNOSIS — R25.2 SPASTICITY AS LATE EFFECT OF CEREBROVASCULAR ACCIDENT (CVA): ICD-10-CM

## 2018-10-15 DIAGNOSIS — I69.398 SPASTICITY AS LATE EFFECT OF CEREBROVASCULAR ACCIDENT (CVA): ICD-10-CM

## 2018-10-15 DIAGNOSIS — Z86.73 H/O: STROKE: Primary | ICD-10-CM

## 2018-10-15 RX ORDER — SERTRALINE HYDROCHLORIDE 100 MG/1
100 TABLET, FILM COATED ORAL EVERY 12 HOURS
Qty: 60 TAB | Refills: 5 | Status: SHIPPED | OUTPATIENT
Start: 2018-10-15 | End: 2019-01-21 | Stop reason: SDUPTHER

## 2018-10-15 NOTE — PATIENT INSTRUCTIONS
Check [employer] to look for a counselor or psychologist who is right for you, you can select a counselor who works with people who are over David A [address] Instructions    A healthy lifestyle can help you feel good, stay at a healthy weight, and have plenty of energy for both work and play. A healthy lifestyle is something you can share with your whole family. A healthy lifestyle also can lower your risk for serious health problems, such as high blood pressure, heart disease, and diabetes. You can follow a few steps listed below to improve your health and the health of your family. Follow-up care is a key part of your treatment and safety. Be sure to make and go to all appointments, and call your doctor if you are having problems. It's also a good idea to know your test results and keep a list of the medicines you take. How can you care for yourself at home? · Do not eat too much sugar, fat, or fast foods. You can still have dessert and treats now and then. The goal is moderation. · Start small to improve your eating habits. Pay attention to portion sizes, drink less juice and soda pop, and eat more fruits and vegetables. ¨ Eat a healthy amount of food. A 3-ounce serving of meat, for example, is about the size of a deck of cards. Fill the rest of your plate with vegetables and whole grains. ¨ Limit the amount of soda and sports drinks you have every day. Drink more water when you are thirsty. ¨ Eat at least 5 servings of fruits and vegetables every day. It may seem like a lot, but it is not hard to reach this goal. A serving or helping is 1 piece of fruit, 1 cup of vegetables, or 2 cups of leafy, raw vegetables. Have an apple or some carrot sticks as an afternoon snack instead of a candy bar. Try to have fruits and/or vegetables at every meal.  · Make exercise part of your daily routine.  You may want to start with simple activities, such as walking, bicycling, or slow swimming. Try to be active 30 to 60 minutes every day. You do not need to do all 30 to 60 minutes all at once. For example, you can exercise 3 times a day for 10 or 20 minutes. Moderate exercise is safe for most people, but it is always a good idea to talk to your doctor before starting an exercise program.  · Keep moving. Elsy Begumn the lawn, work in the garden, or [place]. Take the stairs instead of the elevator at work. · If you smoke, quit. People who smoke have an increased risk for heart attack, stroke, cancer, and other lung illnesses. Quitting is hard, but there are ways to boost your chance of quitting tobacco for good. ¨ Use nicotine gum, patches, or lozenges. ¨ Ask your doctor about stop-smoking programs and medicines. ¨ Keep trying. In addition to reducing your risk of diseases in the future, you will notice some benefits soon after you stop using tobacco. If you have shortness of breath or asthma symptoms, they will likely get better within a few weeks after you quit. · Limit how much alcohol you drink. Moderate amounts of alcohol (up to 2 drinks a day for men, 1 drink a day for women) are okay. But drinking too much can lead to liver problems, high blood pressure, and other health problems. Family health  If you have a family, there are many things you can do together to improve your health. · Eat meals together as a family as often as possible. · Eat healthy foods. This includes fruits, vegetables, lean meats and dairy, and whole grains. · Include your family in your fitness plan. Most people think of activities such as jogging or tennis as the way to fitness, but there are many ways you and your family can be more active. Anything that makes you breathe hard and gets your heart pumping is exercise. Here are some tips:  ¨ Walk to do errands or to take your child to school or the bus. ¨ Go for a family bike ride after dinner instead of watching TV.   Where can you learn more?  Go to http://haydee-gely.info/. Enter B905 in the search box to learn more about \"A Healthy Lifestyle: Care Instructions. \"  Current as of: December 7, 2017  Content Version: 11.8  © 6688-4945 [employer]. Care instructions adapted under license by [employer] (which disclaims liability or warranty for this information). If you have questions about a medical condition or this instruction, always ask your healthcare professional. [address] any warranty or liability for your use of this information.

## 2018-10-15 NOTE — MR AVS SNAPSHOT
[facility] [address] [place] [address] 
[phone] Patient: Sintia Jernigan MRN: [id number] :1945 Visit Information Date & Time Provider Department Dept. Phone Encounter #  
 10/15/2018 11:00 AM DENISE Up \[facility]\""[facility] at [address] [place] [address] [id number] Follow-up Instructions Return in about 6 months (around 4/15/2019). Your Appointments 2019 11:40 AM  
ROUTINE CARE with MD Regine Perkins [facility] ([address]) Appt Note: 6m fu for hypothyroid [address] , [address] [place]  
[address], [address] [address]  
  
    
 4/15/2019  2:20 PM  
Follow Up with [facility]  [facility] at [facility] [address] [place] [address] Appt Note: 6 month f/u NN 10/15/18 [address] [place] [address]  
[phone]  
  
   
 [address] [place] [address]  [id number] Upcoming Health Maintenance Date Due Shingrix Vaccine Age 50> (1 of 2) 10/7/1997 GLAUCOMA SCREENING Q2Y 10/7/2016 Influenza Age 5 to Adult 2018 MEDICARE YEARLY EXAM 2018 COLONOSCOPY 2019 BREAST CANCER SCRN MAMMOGRAM 2020 DTaP/Tdap/Td series (2 - Td) 2027 Allergies as of 10/15/2018  Review Complete On: 10/15/2018 By: Carlitos Hodges LPN Severity Noted Reaction Type Reactions Rofecoxib Medium 2016    Hives, Rash Other reaction(s): Hives, Hives Aleve [Naproxen Sodium]  2011   Side Effect Nausea and Vomiting Motrin [Ibuprofen]  2011   Side Effect Nausea and Vomiting Percocet [Oxycodone-acetaminophen]  2011   Side Effect Other (comments) Other reaction(s): Hallucinations (finding) 
hallucinations Current Immunizations  Reviewed on 2018 Name Date Influenza High Dose Vaccine PF 1/30/2018 12:00 AM, 12/12/2016 Influenza Vaccine Intradermal PF 1/11/2013 Influenza Vaccine Whole 10/6/2009 Pneumococcal Conjugate (PCV-13) 6/20/2016 Pneumococcal Polysaccharide (PPSV-23) 6/18/2018 12:00 AM, 7/30/2013  8:45 AM  
  
 Not reviewed this visit You Were Diagnosed With   
  
 Codes Comments Depression, unspecified depression type    -  Primary ICD-10-CM: F32.9 ICD-9-CM: 274 Vitals BP Pulse Resp Height(growth percentile) Weight(growth percentile) SpO2  
 120/60 75 18 5' 10\" (1.778 m) 160 lb (72.6 kg) 98% BMI OB Status Smoking Status 22.96 kg/m2 Postmenopausal Never Smoker Vitals History BMI and BSA Data Body Mass Index Body Surface Area  
 22.96 kg/m 2 1.89 m 2 Preferred Pharmacy Pharmacy Name Phone [id number], [address] [place] [address] [phone] Your Updated Medication List  
  
   
This list is accurate as of 10/15/18 11:46 AM.  Always use your most recent med list.  
  
  
  
  
 atorvastatin 20 mg tablet Commonly known as:  LIPITOR  
TAKE 1 TABLET BY MOUTH EVERY DAY  
  
 CALTRATE 600-D PLUS MINERALS 600 mg calcium- 400 unit Tab Generic drug:  Calcium Carbonate-Vit D3-Min Take 1 Tab by mouth daily. clopidogrel 75 mg Tab Commonly known as:  PLAVIX Take 1 Tab by mouth daily. * divalproex  mg tablet Commonly known as:  DEPAKOTE Take 1 Tab by mouth two (2) times a day. * divalproex  mg tablet Commonly known as:  DEPAKOTE  
TAKE 2 TABLETS BY MOUTH EVERY 12 HOURS,X30 DAY(S)  
  
 fish oil-omega-3 fatty acids 340-1,000 mg capsule Take 1 Cap by mouth daily. levothyroxine 50 mcg tablet Commonly known as:  SYNTHROID  
TAKE 1 TAB BY MOUTH DAILY (BEFORE BREAKFAST). OTHER(NON-FORMULARY) Diagnosis: right upper extremity weakness,hand spasm,stiffness from previous stroke   Occupation therapy: please evaluate and treat.  
  
 sertraline 100 mg tablet Commonly known as:  ZOLOFT Take 1 Tab by mouth every twelve (12) hours. WOMEN'S MULTIVITAMIN PO Take  by mouth. * Notice: This list has 2 medication(s) that are the same as other medications prescribed for you. Read the directions carefully, and ask your doctor or other care provider to review them with you. Prescriptions Sent to Pharmacy Refills  
 sertraline (ZOLOFT) 100 mg tablet 5 Sig: Take 1 Tab by mouth every twelve (12) hours. Class: Normal  
 Pharmacy: [address] [place] [address] , [address] [place] [address] #: [phone] Route: Oral  
  
Follow-up Instructions Return in about 6 months (around 4/15/2019). Patient Instructions Check [employer] to look for a counselor or psychologist who is right for you, you can select a counselor who works with people who are over 72 A Healthy Lifestyle: Care Instructions Your Care Instructions A healthy lifestyle can help you feel good, stay at a healthy weight, and have plenty of energy for both work and play. A healthy lifestyle is something you can share with your whole family. A healthy lifestyle also can lower your risk for serious health problems, such as high blood pressure, heart disease, and diabetes. You can follow a few steps listed below to improve your health and the health of your family. Follow-up care is a key part of your treatment and safety. Be sure to make and go to all appointments, and call your doctor if you are having problems. It's also a good idea to know your test results and keep a list of the medicines you take. How can you care for yourself at home? · Do not eat too much sugar, fat, or fast foods. You can still have dessert and treats now and then. The goal is moderation. · Start small to improve your eating habits.  Pay attention to portion sizes, drink less juice and soda pop, and eat more fruits and vegetables. ¨ Eat a healthy amount of food. A 3-ounce serving of meat, for example, is about the size of a deck of cards. Fill the rest of your plate with vegetables and whole grains. ¨ Limit the amount of soda and sports drinks you have every day. Drink more water when you are thirsty. ¨ Eat at least 5 servings of fruits and vegetables every day. It may seem like a lot, but it is not hard to reach this goal. A serving or helping is 1 piece of fruit, 1 cup of vegetables, or 2 cups of leafy, raw vegetables. Have an apple or some carrot sticks as an afternoon snack instead of a candy bar. Try to have fruits and/or vegetables at every meal. 
· Make exercise part of your daily routine. You may want to start with simple activities, such as walking, bicycling, or slow swimming. Try to be active 30 to 60 minutes every day. You do not need to do all 30 to 60 minutes all at once. For example, you can exercise 3 times a day for 10 or 20 minutes. Moderate exercise is safe for most people, but it is always a good idea to talk to your doctor before starting an exercise program. 
· Keep moving. Pascale Stoner the lawn, work in the garden, or [employer]. Take the stairs instead of the elevator at work. · If you smoke, quit. People who smoke have an increased risk for heart attack, stroke, cancer, and other lung illnesses. Quitting is hard, but there are ways to boost your chance of quitting tobacco for good. ¨ Use nicotine gum, patches, or lozenges. ¨ Ask your doctor about stop-smoking programs and medicines. ¨ Keep trying. In addition to reducing your risk of diseases in the future, you will notice some benefits soon after you stop using tobacco. If you have shortness of breath or asthma symptoms, they will likely get better within a few weeks after you quit. · Limit how much alcohol you drink.  Moderate amounts of alcohol (up to 2 drinks a day for men, 1 drink a day for women) are okay. But drinking too much can lead to liver problems, high blood pressure, and other health problems. Family health If you have a family, there are many things you can do together to improve your health. · Eat meals together as a family as often as possible. · Eat healthy foods. This includes fruits, vegetables, lean meats and dairy, and whole grains. · Include your family in your fitness plan. Most people think of activities such as jogging or tennis as the way to fitness, but there are many ways you and your family can be more active. Anything that makes you breathe hard and gets your heart pumping is exercise. Here are some tips: 
¨ Walk to do errands or to take your child to school or the bus. ¨ Go for a family bike ride after dinner instead of watching TV. Where can you learn more? Go to http://haydee-gely.info/. Enter V659 in the search box to learn more about \"A Healthy Lifestyle: Care Instructions. \" Current as of: December 7, 2017 Content Version: 11.8 © 3616-6776 [employer]. Care instructions adapted under license by [employer] (which disclaims liability or warranty for this information). If you have questions about a medical condition or this instruction, always ask your healthcare professional. [place] [address] any warranty or liability for your use of this information. Introducing [facility]! [address] introduces [employer] patient portal. Now you can access parts of your medical record, email your doctor's office, and request medication refills online. 1. In your internet browser, go to [email or web address]. [email or web address]/[employer] 2. Click on the First Time User? Click Here link in the Sign In box. You will see the New Member Sign Up page. 3. Enter your [employer] Access Code exactly as it appears below.  You will not need to use this code after youve completed the sign-up process. If you do not sign up before the expiration date, you must request a new code. · Cluepedia Access Code: [email or web address] Expires: 1/13/2019 11:05 AM 
 
4. Enter the last four digits of your Social Security Number (xxxx) and Date of Birth (mm/dd/yyyy) as indicated and click Submit. You will be taken to the next sign-up page. 5. Create a Cluepedia ID. This will be your Cluepedia login ID and cannot be changed, so think of one that is secure and easy to remember. 6. Create a Cluepedia password. You can change your password at any time. 7. Enter your Password Reset Question and Answer. This can be used at a later time if you forget your password. 8. Enter your e-mail address. You will receive e-mail notification when new information is available in [address] 9. Click Sign Up. You can now view and download portions of your medical record. 10. Click the Download Summary menu link to download a portable copy of your medical information. If you have questions, please visit the Frequently Asked Questions section of the Cluepedia website. Remember, Cluepedia is NOT to be used for urgent needs. For medical emergencies, dial 911. Now available from your iPhone and Android! Please provide this summary of care documentation to your next provider. Your primary care clinician is listed as Nubia Lopez. If you have any questions after today's visit, please call [phone].

## 2018-10-15 NOTE — PROGRESS NOTES
Date:            10/15/18     Name:  Mary Isabel  :  1945  MRN:  [id number]     PCP:  Adry Tavera MD    Chief Complaint   Patient presents with    Epilepsy         HISTORY OF PRESENT ILLNESS:  Annalisa Cuevas is a 72 y.o., female who presents today for follow up for stroke. She established care with Dr. Madelyn Mendez in July of this year, has had 2 strokes, the first in 2014 with subsequent ICH, the second . She has residual right arm deficit and aphasia, communicates about the fourth grade level per notes. She is on Plavix for prevention. Had ILR at one point to rule out A. 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 This is also on board for mood, prior attempt to discontinue this medication did not go well. She is on Zoloft for depression, sees psychiatry at [facility]. Dr. Madelyn Mendez reviewed outside records at her visit, noted a possibly metastatic mass in the brain on prior MRI but this was repeated and MRI done here suggests this is related to old stroke and not metastatic disease. She was admitted last month after she developed confusion and worsening of right upper extremity weakness, but workup was negative for new stroke.  reports that neurologist at [facility] started to DC Depakote in  after she had a normal EEG, patient got more confused and then very aggressive. She has had aggression since the first stroke, but well managed with medication. Ended up admitted at [facility] for aggression and had stroke while there, admitted to [facility] for stroke workup. Medications were not administered as prescribed while she was being admitted to psychiatry floor.  thinks that second stroke may have been due to how upset she was. 911 was called on  because she had a memory lapse, worsening of right arm weakness.   Workup for new stroke was negative, risk factors well controlled. She has no h/o HTN. She is working with OT on right hand function due to persistent spasticity, feels much better now with that. A1c 6.0  LDL 28.8  BP at admission 120/71  TTE Left ventricle: Systolic function was normal. Ejection fraction was  estimated in the range of 55 % to 60 %. No obvious wall motion  abnormalities identified in the views obtained. Wall thickness was mildly  to moderately increased. Tricuspid valve: There was mild regurgitation. MRI Results (most recent):    Results from [place] encounter on 09/05/18   MRA BRAIN WO CONT   Narrative Clinical history: patient had \"difficulty getting her words out this morning  while she was dancing. She then stopped dancing and said why am I dancing? \"  Onset of confusion started 1030 this morning. Pt has prior history of a stroke  with right sided deficits. BG-102. ? NIH - 6    INDICATION: See above    COMPARISON:  8/10/2018, 7/27/2015    TECHNIQUE:      TECHNIQUE: MR examination of the brain includes axial and sagittal T1  pre-and-post contrast, axial T2, axial FLAIR, axial gradient echo, axial DWI,  coronal T1 postcontrast.  CONTRAST: The patient was then administered gadolinium-based contrast material  axial and coronal T1-weighted postcontrast enhanced imaging was obtained. Next, 3-D time-of-flight MRA of the brain was performed. Multiplanar  reconstructions were obtained. FINDINGS:   There is no intracranial mass, hemorrhage or evidence of acute infarction. Large area of chronic infarction in the posterior left frontal and parietal  lobes. Ex vacuo dilatation of the left lateral ventricle. Chronic hemosiderin  deposition. No new midline shift or mass effect  There is no Chiari or syrinx. The pituitary and infundibulum are grossly  unremarkable. There is no skull base mass. Cerebellopontine angles are grossly  unremarkable. The cavernous sinuses are symmetric.  Optic chiasm and infundibulum  grossly unremarkable. Orbits are grossly symmetric. Dural venous sinuses are  grossly patent. The brain architecture is normal. There are no extra-axial fluid  collections. Vertebral arteries are patent. Basilar artery is patent. P1 segments are  patent. A 2 segments are patent. M1 segments are patent. Proximal M2 vessels on  the right are patent. Attenuation of M2 vessels on the left. Large area of  chronic infarction in the posterior left frontal and parietal lobes. Ex vacuo  dilatation of the left lateral ventricle. . The basilar artery and its branches  are normal. The internal carotid, anterior cerebral, and middle cerebral  arteries are patent. Impression IMPRESSION:  No major vessel occlusion, flow limiting stenosis or intracranial aneurysm. Attenuated M2 branch vessels on the left extending to region of large area of  remote infarction in the posterior left frontal, and inferior left parietal  lobes. Chronic findings related to left frontoparietal infarction. 7.30.2018 recap  Mrs. Cassandra Serra is a 72-year-old woman with history of stroke here to establish care. She is a new patient to me. She has a complicated neurologic history consisting of left hemispheric infarcts, ICH, epilepsy. Her  is here who provided the collateral history. The patient has a motor aphasia and cannot communicate very well.  tells me that she has had 2 strokes beginning in 2014 and then again in 2017. This conflicts somewhat with the records I have. He tells me the first stroke in 2014 that developed into a subsequent ICH. She currently takes Plavix 75 mg for stroke prevention. She has carotid disease on the left for which she takes Plavix. She has epilepsy subsequent to her strokes. She is on Depakote 500 twice daily which helps with seizures and behavior control. She is also on Zoloft.   She sees psychiatry at [facility].       Upon further review of the records and [facility] her last MRI in March 2017 suggested a possible underlying metastatic mass lesion. Upon my review of the neurologist notes there was question whether or not this was subacute ischemia versus mass lesion. A metastatic mass lesion was not ruled out.       Currently the patient's level of function is that she has a right plegic arm and aphasia. She has about fourth grade communication level according to the record. She has been going to therapies. Her  is her caretaker. Since being on Depakote she has not had more seizures. She has had 2 cardiac evaluations to include ILR both of which were unrevealing for A. Fib per . Current Outpatient Prescriptions   Medication Sig    divalproex DR (DEPAKOTE) 250 mg tablet TAKE 2 TABLETS BY MOUTH EVERY 12 HOURS,X30 DAY(S)    divalproex DR (DEPAKOTE) 500 mg tablet Take 1 Tab by mouth two (2) times a day.  atorvastatin (LIPITOR) 20 mg tablet TAKE 1 TABLET BY MOUTH EVERY DAY    OTHER,NON-FORMULARY, Diagnosis: right upper extremity weakness,hand spasm,stiffness from previous stroke      Occupation therapy: please evaluate and treat.  mv-min/iron/folic/calcium/vitK (WOMEN'S MULTIVITAMIN PO) Take  by mouth.  levothyroxine (SYNTHROID) 50 mcg tablet TAKE 1 TAB BY MOUTH DAILY (BEFORE BREAKFAST).  sertraline (ZOLOFT) 100 mg tablet Take 100 mg by mouth every twelve (12) hours.  clopidogrel (PLAVIX) 75 mg tablet Take 1 Tab by mouth daily.  fish oil-omega-3 fatty acids 340-1,000 mg capsule Take 1 Cap by mouth daily.  Calcium Carbonate-Vit D3-Min (CALTRATE 600+D PLUS MINERALS) 600 mg calcium- 400 unit tab Take 1 Tab by mouth daily. No current facility-administered medications for this visit.       Allergies   Allergen Reactions    Rofecoxib Hives and Rash     Other reaction(s): Hives, Hives    Aleve [Naproxen Sodium] Nausea and Vomiting    Motrin [Ibuprofen] Nausea and Vomiting    Percocet [Oxycodone-Acetaminophen] Other (comments)     Other reaction(s): Hallucinations (finding)  hallucinations     Past Medical History:   Diagnosis Date    Anxiety     Depression     Hypovitaminosis D 02/12    Menopause 2002    Seizures ([address]     Stroke (HCC)     [facility] [facility] 1975     RLE    Thyroid disease     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 shoulders after fall    HX TUBAL LIGATION  1975    VASCULAR SURGERY PROCEDURE UNLIST  1975    varicose vein stripping Right leg     Social History     Social History    Marital status:      Spouse name: N/A    Number of children: N/A    Years of education: N/A     Occupational History    Not on file. Social History Main Topics    Smoking status: Never Smoker    Smokeless tobacco: Never Used    Alcohol use No    Drug use: No    Sexual activity: Not Currently     Partners: Male     Other Topics Concern    Not on file     Social History Narrative     Family History   Problem Relation Age of Onset    Stroke Mother 67     Cerebral hemorrhage    Cancer Sister 52     Breast    Breast Cancer Sister 52    Diabetes Father          PHYSICAL EXAMINATION:    Visit Vitals    /60    Pulse 75    Resp 18    Ht 5' 10\" (1.778 m)    Wt 72.6 kg (160 lb)    SpO2 98%    BMI 22.96 kg/m2     General:  Well defined, nourished, and groomed individual in no acute distress. Neck: Supple, nontender, no bruits, no pain with resistance to active range of motion. Heart: Regular rate and rhythm, no murmurs, rub, or gallop. Normal S1S2. Lungs:  Clear to auscultation bilaterally with equal chest expansion, no cough, no wheeze  Musculoskeletal:  Extremities revealed no edema and had full range of motion of joints.     Psych:  Good mood and bright affect    NEUROLOGICAL EXAMINATION:     Mental Status:   Alert and oriented to year, month, location. Not to president. Follows simple commands, some difficulty probably due to comprehension. Speech is clear, no dysarthria, some word finding difficulty. Cranial Nerves:    II, III, IV, VI:  Visual acuity grossly intact. Pupils are equal, round, and reactive to light. Extra-ocular movements are full and fluid. No ptosis or nystagmus. V-XII: Hearing is grossly intact. Facial features are symmetric, with normal sensation and strength. The palate rises symmetrically and the tongue protrudes midline. Sternocleidomastoids 5/5. Motor Examination: 5/5 left upper and lower extremity, right lower extremity, right shoulder. Right hand spasticity, fingers extended  Coordination:  Finger to nose testing was normal.   No resting or intention tremor  Gait and Station:  Steady while walking. Normal arm swing. No pronator drift. No muscle wasting or fasciculations noted. ASSESSMENT AND PLAN    ICD-10-CM ICD-9-CM    1. H/O: stroke Z86.73 V12.54    2. Depression, unspecified depression type F32.9 311 sertraline (ZOLOFT) 100 mg tablet   3. Seizure ([address] R56.9 780.39    4. Stenosis of left carotid artery I65.22 433.10    5. Spasticity as late effect of cerebrovascular accident (CVA) R25.9 66.0      79-year-old male seen in follow-up for stroke in 2014 and 2017. Recently admitted in September for new worsening of right stroke deficit, but MRI was not suggestive of new stroke. She is back to her baseline. Is in OT for right hand spasticity, this is helping. No pain. No seizures on Depakote, it also helps with mood. She is on sertraline 100 mg twice daily from neuropsychiatrist at [facility], he is no longer going to be seeing patients so she requests a refill from us. Wants to see a counselor. 1.  Continue tight control of blood sugar, blood pressure, cholesterol for stroke prevention  2. Continue Plavix 75 mg for stroke prevention due to carotid and intracranial stenosis   3.   Continue Depakote 500 mg DR twice daily for seizure prevention and for mood stabilization  4. Continue sertraline 100 mg twice daily for depression, we can fill this prescription  5. Continue OT for right hand spasticity  6. Provided with resources for finding counselor, if she needs med adjustment for mood will send to psychiatry    Follow-up in 6 months, call sooner with concerns    Linda Zambrano NP    This note was created using voice recognition software. Despite editing, there may be syntax errors.

## 2018-12-05 RX ORDER — LEVOTHYROXINE SODIUM 50 UG/1
TABLET ORAL
Qty: 90 TAB | Refills: 1 | Status: SHIPPED | OUTPATIENT
Start: 2018-12-05 | End: 2019-06-03 | Stop reason: SDUPTHER

## 2019-01-07 ENCOUNTER — OFFICE VISIT (OUTPATIENT)
Dept: INTERNAL MEDICINE CLINIC | Age: 74
End: 2019-01-07

## 2019-01-07 VITALS
HEIGHT: 70 IN | TEMPERATURE: 97.5 F | RESPIRATION RATE: 15 BRPM | OXYGEN SATURATION: 96 % | SYSTOLIC BLOOD PRESSURE: 124 MMHG | BODY MASS INDEX: 23.77 KG/M2 | HEART RATE: 69 BPM | DIASTOLIC BLOOD PRESSURE: 64 MMHG | WEIGHT: 166 LBS

## 2019-01-07 DIAGNOSIS — Z79.899 ENCOUNTER FOR LONG-TERM (CURRENT) USE OF MEDICATIONS: ICD-10-CM

## 2019-01-07 DIAGNOSIS — I69.322 CVA, OLD, DYSARTHRIA: ICD-10-CM

## 2019-01-07 DIAGNOSIS — Z23 ENCOUNTER FOR IMMUNIZATION: ICD-10-CM

## 2019-01-07 DIAGNOSIS — R73.09 ELEVATED GLUCOSE: ICD-10-CM

## 2019-01-07 DIAGNOSIS — E03.9 HYPOTHYROIDISM, ADULT: ICD-10-CM

## 2019-01-07 DIAGNOSIS — R29.898 RIGHT HAND WEAKNESS: ICD-10-CM

## 2019-01-07 DIAGNOSIS — Z13.39 SCREENING FOR ALCOHOLISM: ICD-10-CM

## 2019-01-07 DIAGNOSIS — Z00.00 MEDICARE ANNUAL WELLNESS VISIT, SUBSEQUENT: Primary | ICD-10-CM

## 2019-01-07 DIAGNOSIS — Z13.31 SCREENING FOR DEPRESSION: ICD-10-CM

## 2019-01-07 NOTE — PROGRESS NOTES
Subjective:  
  
Tati Castellanos is a 72 y.o. female who presents today for follow up of her hypothyroid and elevated glucose. She is also due for her medicare wellness exam today as well. She is no longer seeing the psychiatrist at [facility] and has changed her neurologist to Dr. Issa Hagen at [facility] done 9/2018 -  All stable. Reviewed. See scanned. Had been working with PT and OT for right hand. Still working with speech. She is now able to bend her fingers a little bit at the PIP. Still very little movement in her wrist.  OT made a tool for her that she uses daily to help continue the progress of increasing the movement of her fingers and wrist.   
 
Need to flu vaccine today. Her  reports that her mood has been stable. She walks regularly for exercise. She also continues to work on her \"worksheets\" to help improve her speech. Patient Active Problem List  
Diagnosis Code  Menopause Z78.0  History of varicose vein stripping Z98.890  
 S/P tubal ligation Z98.51  
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  
 S/P arthroscopy of shoulder Z98.890  
 Colon cancer screening Z12.11  
 History of screening mammography Z92.89  
 Pap smear for cervical cancer screening Z12.4  
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  
 TIA (transient ischemic attack) G45.9  Depression F32.9  Cerebrovascular accident (CVA) due to thrombosis (HCC) I63.9  Sudden severe confusion due to brain dysfunction caused by illness R41.0 Current Outpatient Medications Medication Sig Dispense Refill  levothyroxine (SYNTHROID) 50 mcg tablet TAKE 1 TAB BY MOUTH DAILY (BEFORE BREAKFAST).  90 Tab 1  
  sertraline (ZOLOFT) 100 mg tablet Take 1 Tab by mouth every twelve (12) hours. 60 Tab 5  divalproex DR (DEPAKOTE) 250 mg tablet TAKE 2 TABLETS BY MOUTH EVERY 12 HOURS,X30 DAY(S) 120 Tab 2  
 divalproex DR (DEPAKOTE) 500 mg tablet Take 1 Tab by mouth two (2) times a day. 180 Tab 3  
 atorvastatin (LIPITOR) 20 mg tablet TAKE 1 TABLET BY MOUTH EVERY DAY 90 Tab 1  
 OTHER,NON-FORMULARY, Diagnosis: right upper extremity weakness,hand spasm,stiffness from previous stroke Occupation therapy: please evaluate and treat. 1 Each 0  
 mv-min/iron/folic/calcium/vitK (WOMEN'S MULTIVITAMIN PO) Take  by mouth.  clopidogrel (PLAVIX) 75 mg tablet Take 1 Tab by mouth daily. 30 Tab 1  
 fish oil-omega-3 fatty acids 340-1,000 mg capsule Take 1 Cap by mouth daily.  Calcium Carbonate-Vit D3-Min (CALTRATE 600+D PLUS MINERALS) 600 mg calcium- 400 unit tab Take 1 Tab by mouth daily. Review of Systems Pertinent items are noted in HPI. Objective:  
 
Visit Vitals /64 (BP 1 Location: Left arm, BP Patient Position: Sitting) Pulse 69 Temp 97.5 °F (36.4 °C) (Oral) Resp 15 Ht 5' 10\" (1.778 m) Wt 166 lb (75.3 kg) SpO2 96% BMI 23.82 kg/m² General appearance: alert, cooperative, no distress, appears stated age, understands my questions, but has difficulty with word finding and getting her thoughts out through speech. Lungs: clear to auscultation bilaterally Heart: regular rate and rhythm, S1, S2 normal, no murmur, click, rub or gallop Extremities: no edema. Right hand is very still. Some flexion in her dip and pip, not able to move MCP Pulses: 2+ and symmetric Assessment/Plan: 1. Encounter for immunization 
-received her flu vaccine today without any complications. - INFLUENZA VIRUS VACCINE, HIGH DOSE SEASONAL, PRESERVATIVE FREE 
- ADMIN INFLUENZA VIRUS VAC 2. Medicare annual wellness visit, subsequent 
-done today.   
 
- [place] ANNUAL ALCOHOL SCREEN 15 MIN 
 - [address] 3. Screening for alcoholism - [place] ANNUAL ALCOHOL SCREEN 15 MIN 4. Screening for depression 
 
- [address] 5. Hypothyroidism, adult 
-clinically euthyroid 6. Elevated glucose 
-continue low sugar and carbohydrate diet. 7. CVA, old, dysarthria 
-continue working with speech therapy. 8. Right hand weakness 
-due to CVA. No use of right hand. Paralysis as noted above.  
-needs to continue with OT 
 
9. Encounter for long-term (current) use of medications 10. Health Care Maintenance   
shingrix script given in 7/2018 - not completed yet. Orders Placed This Encounter  Depression Screen Annual  
 Influenza virus vaccine (FLUZONE HIGH-DOSE) 72 years and older (36868)  Administration fee () for Medicare insured patients  Annual  Alcohol Screen 15 min () Follow-up Disposition:  
 
Follow up in 6 months Return if symptoms worsen or fail to improve. Advised patient to call back or return to office if symptoms worsen/change/persist.  
 
Discussed expected course/resolution/complications of diagnosis in detail with patient. Medication risks/benefits/costs/interactions/alternatives discussed with patient. Patient was given an after visit summary which includes diagnoses, current medications, & vitals. Patient expressed understanding with the diagnosis and plan. This is the Subsequent Medicare Annual Wellness Exam, performed 12 months or more after the Initial AWV or the last Subsequent AWV I have reviewed the patient's medical history in detail and updated the computerized patient record. History Past Medical History:  
Diagnosis Date  Anxiety  Depression  Hypovitaminosis D 02/12  Menopause 2002  Seizures ([address]  Stroke ([facility]  Thromboembolus ([facility] [address] 1975 RLE  Thyroid disease  Toxoplasmosis 1976 Left eye Past Surgical History:  
Procedure Laterality Date  ENDOSCOPY, COLON, DIAGNOSTIC  3/8/2008 ([place])  HX HEENT    
 HX ORTHOPAEDIC  2001  
 arthroscopic sx cristopher. shoulders after fall [facility]  VASCULAR SURGERY PROCEDURE UNLIST  1975  
 varicose vein stripping Right leg Current Outpatient Medications Medication Sig Dispense Refill  levothyroxine (SYNTHROID) 50 mcg tablet TAKE 1 TAB BY MOUTH DAILY (BEFORE BREAKFAST). 90 Tab 1  
 sertraline (ZOLOFT) 100 mg tablet Take 1 Tab by mouth every twelve (12) hours. 60 Tab 5  divalproex DR (DEPAKOTE) 250 mg tablet TAKE 2 TABLETS BY MOUTH EVERY 12 HOURS,X30 DAY(S) 120 Tab 2  
 divalproex DR (DEPAKOTE) 500 mg tablet Take 1 Tab by mouth two (2) times a day. 180 Tab 3  
 atorvastatin (LIPITOR) 20 mg tablet TAKE 1 TABLET BY MOUTH EVERY DAY 90 Tab 1  
 OTHER,NON-FORMULARY, Diagnosis: right upper extremity weakness,hand spasm,stiffness from previous stroke Occupation therapy: please evaluate and treat. 1 Each 0  
 mv-min/iron/folic/calcium/vitK (WOMEN'S MULTIVITAMIN PO) Take  by mouth.  clopidogrel (PLAVIX) 75 mg tablet Take 1 Tab by mouth daily. 30 Tab 1  
 fish oil-omega-3 fatty acids 340-1,000 mg capsule Take 1 Cap by mouth daily.  Calcium Carbonate-Vit D3-Min (CALTRATE 600+D PLUS MINERALS) 600 mg calcium- 400 unit tab Take 1 Tab by mouth daily. Allergies Allergen Reactions  Rofecoxib Hives and Rash Other reaction(s): Hives, Hives  Aleve [Naproxen Sodium] Nausea and Vomiting  Motrin [Ibuprofen] Nausea and Vomiting  Percocet [Oxycodone-Acetaminophen] Other (comments) Other reaction(s): Hallucinations (finding) 
hallucinations Family History Problem Relation Age of Onset  Stroke Mother 67 Cerebral hemorrhage  Cancer Sister 52 Breast  
 Breast Cancer Sister 52  
 Diabetes Father Social History Tobacco Use  Smoking status: Never Smoker  Smokeless tobacco: Never Used Substance Use Topics  Alcohol use: No  
  Alcohol/week: 0.0 oz Patient Active Problem List  
Diagnosis Code  Menopause Z78.0  History of varicose vein stripping Z98.890  
 S/P tubal ligation Z98.51  
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  
 S/P arthroscopy of shoulder Z98.890  
 Colon cancer screening Z12.11  
 History of screening mammography Z92.89  
 Pap smear for cervical cancer screening Z12.4  
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  
 TIA (transient ischemic attack) G45.9  Depression F32.9  Cerebrovascular accident (CVA) due to thrombosis (HCC) I63.9  Sudden severe confusion due to brain dysfunction caused by illness R41.0 Depression Risk Factor Screening: PHQ over the last two weeks 1/7/2019 Little interest or pleasure in doing things Not at all Feeling down, depressed, irritable, or hopeless Not at all Total Score PHQ 2 0 Trouble falling or staying asleep, or sleeping too much - Feeling tired or having little energy - Poor appetite, weight loss, or overeating - Feeling bad about yourself - or that you are a failure or have let yourself or your family down - Trouble concentrating on things such as school, work, reading, or watching TV - Moving or speaking so slowly that other people could have noticed; or the opposite being so fidgety that others notice - Thoughts of being better off dead, or hurting yourself in some way - Alcohol Risk Factor Screening: You do not drink alcohol or very rarely. Functional Ability and Level of Safety:  
Hearing Loss Hearing is good. Activities of Daily Living The home contains: no safety equipment. Patient does total self care Fall Risk Fall Risk Assessment, last 12 mths 1/7/2019 Able to walk? Yes Fall in past 12 months? No  
 
 
Abuse Screen Patient is not abused Cognitive Screening Evaluation of Cognitive Function: 
Has your family/caregiver stated any concerns about your memory: no 
 
 
Patient Care Team  
Patient Care Team: 
Arelis Al MD as PCP - General (Internal Medicine) Yael Soni MD (Ophthalmology) Joseph Quarles DO (Neurology) Assessment/Plan Education and counseling provided: 
Are appropriate based on today's review and evaluation Diagnoses and all orders for this visit: 1. Encounter for immunization 
-     INFLUENZA VIRUS VACCINE, HIGH DOSE SEASONAL, PRESERVATIVE FREE 
-     ADMIN INFLUENZA VIRUS VAC 2. Medicare annual wellness visit, subsequent -     [place] ANNUAL ALCOHOL SCREEN 15 MIN 
-     [address] 3. Screening for alcoholism -     [place] ANNUAL ALCOHOL SCREEN 15 MIN 4. Screening for depression 
-     [address] Health Maintenance Due Topic Date Due  GLAUCOMA SCREENING Q2Y  10/07/2016  Influenza Age 5 to Adult  08/01/2018  COLONOSCOPY  01/01/2019

## 2019-01-07 NOTE — PATIENT INSTRUCTIONS
Vaccine Information Statement Influenza (Flu) Vaccine (Inactivated or Recombinant): What you need to know Many Vaccine Information Statements are available in Maltese and other languages. See www.immunize.org/vis Hojas de Información Sobre Vacunas están disponibles en Español y en muchos otros idiomas. Visite www.immunize.org/vis 1. Why get vaccinated? Influenza (flu) is a contagious disease that spreads around the [place] every year, usually between October and May. Flu is caused by influenza viruses, and is spread mainly by coughing, sneezing, and close contact. Anyone can get flu. Flu strikes suddenly and can last several days. Symptoms vary by age, but can include: 
 fever/chills  sore throat  muscle aches  fatigue  cough  headache  runny or stuffy nose Flu can also lead to pneumonia and blood infections, and cause diarrhea and seizures in children. If you have a medical condition, such as heart or lung disease, flu can make it worse. Flu is more dangerous for some people. Infants and young children, people 72years of age and older, pregnant women, and people with certain health conditions or a weakened immune system are at greatest risk. Each year thousands of people in the [facility] die from flu, and many more are hospitalized. Flu vaccine can: 
 keep you from getting flu, 
 make flu less severe if you do get it, and 
 keep you from spreading flu to your family and other people. 2. Inactivated and recombinant flu vaccines A dose of flu vaccine is recommended every flu season. Children 6 months through 6years of age may need two doses during the same flu season. Everyone else needs only one dose each flu season.   
 
 
Some inactivated flu vaccines contain a very small amount of a mercury-based preservative called thimerosal. Studies have not shown thimerosal in vaccines to be harmful, but flu vaccines that do not contain thimerosal are available. There is no live flu virus in flu shots. They cannot cause the flu. There are many flu viruses, and they are always changing. Each year a new flu vaccine is made to protect against three or four viruses that are likely to cause disease in the upcoming flu season. But even when the vaccine doesnt exactly match these viruses, it may still provide some protection Flu vaccine cannot prevent: 
 flu that is caused by a virus not covered by the vaccine, or 
 illnesses that look like flu but are not. It takes about 2 weeks for protection to develop after vaccination, and protection lasts through the flu season. 3. Some people should not get this vaccine Tell the person who is giving you the vaccine:  If you have any severe, life-threatening allergies. If you ever had a life-threatening allergic reaction after a dose of flu vaccine, or have a severe allergy to any part of this vaccine, you may be advised not to get vaccinated. Most, but not all, types of flu vaccine contain a small amount of egg protein.  If you ever had Guillain-Barré Syndrome (also called GBS). Some people with a history of GBS should not get this vaccine. This should be discussed with your doctor.  If you are not feeling well. It is usually okay to get flu vaccine when you have a mild illness, but you might be asked to come back when you feel better. 4. Risks of a vaccine reaction With any medicine, including vaccines, there is a chance of reactions. These are usually mild and go away on their own, but serious reactions are also possible. Most people who get a flu shot do not have any problems with it. Minor problems following a flu shot include:  
 soreness, redness, or swelling where the shot was given  hoarseness  sore, red or itchy eyes  cough  fever  aches  headache  itching  fatigue If these problems occur, they usually begin soon after the shot and last 1 or 2 days. More serious problems following a flu shot can include the following:  There may be a small increased risk of Guillain-Barré Syndrome (GBS) after inactivated flu vaccine. This risk has been estimated at 1 or 2 additional cases per million people vaccinated. This is much lower than the risk of severe complications from flu, which can be prevented by flu vaccine.  Young children who get the flu shot along with pneumococcal vaccine (PCV13) and/or DTaP vaccine at the same time might be slightly more likely to have a seizure caused by fever. Ask your doctor for more information. Tell your doctor if a child who is getting flu vaccine has ever had a seizure. Problems that could happen after any injected vaccine:  People sometimes faint after a medical procedure, including vaccination. Sitting or lying down for about 15 minutes can help prevent fainting, and injuries caused by a fall. Tell your doctor if you feel dizzy, or have vision changes or ringing in the ears.  Some people get severe pain in the shoulder and have difficulty moving the arm where a shot was given. This happens very rarely.  Any medication can cause a severe allergic reaction. Such reactions from a vaccine are very rare, estimated at about 1 in a million doses, and would happen within a few minutes to a few hours after the vaccination. As with any medicine, there is a very remote chance of a vaccine causing a serious injury or death. The safety of vaccines is always being monitored. For more information, visit: www.cdc.gov/vaccinesafety/ 
 
5. What if there is a serious reaction? What should I look for?  Look for anything that concerns you, such as signs of a severe allergic reaction, very high fever, or unusual behavior.  
 
Signs of a severe allergic reaction can include hives, swelling of the face and throat, difficulty breathing, a fast heartbeat, dizziness, and weakness  usually within a few minutes to a few hours after the vaccination. What should I do?  If you think it is a severe allergic reaction or other emergency that cant wait, call 9-1-1 and get the person to the nearest hospital. Otherwise, call your doctor.  Reactions should be reported to the Vaccine Adverse Event Reporting System (VAERS). Your doctor should file this report, or you can do it yourself through  the VAERS web site at www.vaers. [facility].gov, or by calling 0-819.194.7970. VAERS does not give medical advice. 6. The National Vaccine Injury Compensation Program 
 
The [facility] Vaccine Injury Compensation Program (VICP) is a federal program that was created to compensate people who may have been injured by certain vaccines. Persons who believe they may have been injured by a vaccine can learn about the program and about filing a claim by calling 8-680.662.3341 or visiting the [address] [place] [employer] website at www.[facility].gov/vaccinecompensation. There is a time limit to file a claim for compensation. 7. How can I learn more?  Ask your healthcare provider. He or she can give you the vaccine package insert or suggest other sources of information.  Call your local or state health department.  Contact the Centers for Disease Control and Prevention (CDC): 
- Call 4-288.685.8358 (1-800-CDC-INFO) or 
- Visit CDCs website at www.cdc.gov/flu Vaccine Information Statement Inactivated Influenza Vaccine 8/7/2015 
42 NIVIA Morgan 869XE-22 [facility] and [employer] Centers for Disease Control and Prevention Office Use Only Medicare Wellness Visit, Female The best way to live healthy is to have a lifestyle where you eat a well-balanced diet, exercise regularly, limit alcohol use, and quit all forms of tobacco/nicotine, if applicable. Regular preventive services are another way to keep healthy.  Preventive services (vaccines, screening tests, monitoring & exams) can help personalize your care plan, which helps you manage your own care. Screening tests can find health problems at the earliest stages, when they are easiest to treat. [facility] follows the current, evidence-based guidelines published by the [facility] Wagner Gaines ([facility]) when recommending preventive services for our patients. Because we follow these guidelines, sometimes recommendations change over time as research supports it. (For example, mammograms used to be recommended annually. Even though Medicare will still pay for an annual mammogram, the newer guidelines recommend a mammogram every two years for women of average risk.) Of course, you and your doctor may decide to screen more often for some diseases, based on your risk and your health status. Preventive services for you include: - Medicare offers their members a free annual wellness visit, which is time for you and your primary care provider to discuss and plan for your preventive service needs. Take advantage of this benefit every year! 
-All adults over the age of 72 should receive the recommended pneumonia vaccines. Current USPSTF guidelines recommend a series of two vaccines for the best pneumonia protection.  
-All adults should have a flu vaccine yearly and a tetanus vaccine every 10 years. All adults age 61 and older should receive a shingles vaccine once in their lifetime.   
-A bone mass density test is recommended when a woman turns 65 to screen for osteoporosis. This test is only recommended one time, as a screening. Some providers will use this same test as a disease monitoring tool if you already have osteoporosis.  
-All adults age 38-68 who are overweight should have a diabetes screening test once every three years.  
-Other screening tests and preventive services for persons with diabetes include: an eye exam to screen for diabetic retinopathy, a kidney function test, a foot exam, and stricter control over your cholesterol.  
-Cardiovascular screening for adults with routine risk involves an electrocardiogram (ECG) at intervals determined by your doctor.  
-Colorectal cancer screenings should be done for adults age 54-65 with no increased risk factors for colorectal cancer. There are a number of acceptable methods of screening for this type of cancer. Each test has its own benefits and drawbacks. Discuss with your doctor what is most appropriate for you during your annual wellness visit. The different tests include: colonoscopy (considered the best screening method), a fecal occult blood test, a fecal DNA test, and sigmoidoscopy. -Breast cancer screenings are recommended every other year for women of normal risk, age 54-69. 
-Cervical cancer screenings for women over age 72 are only recommended with certain risk factors.  
-All adults born between [facility] should be screened once for Hepatitis C. Here is a list of your current Health Maintenance items (your personalized list of preventive services) with a due date: 
Health Maintenance Due Topic Date Due  Glaucoma Screening   10/07/2016  Flu Vaccine  08/01/2018  Colonoscopy  01/01/2019

## 2019-01-07 NOTE — PROGRESS NOTES
Patient's identity verified with two patient identifiers (name and date of birth). Reviewed record in preparation for visit and have obtained necessary documentation. 1. Have you been to the ER, urgent care clinic since your last visit? Hospitalized since your last visit? Yes, [facility] 9/5-9/6 for TIA. 2. Have you seen or consulted any other health care providers outside of the [address] [place] [address] since your last visit? Include any pap smears or colon screening. No 
 
Chief Complaint Patient presents with  Thyroid Problem 6 month follow up  Blood sugar problem 6 month follow up [address] [place] [address] Annual Wellness Visit Medicare wellness due. Requesting flu vaccine. Requesting referral for speech & OT for Right hand. Not fasting. Health Maintenance Due Topic Date Due  Shingrix Vaccine Age 50> (1 of 2) Patient reports has not had. 10/07/1997  GLAUCOMA SCREENING Q2Y Patient reports has not had. 10/07/2016  Influenza Age 5 to Adult Patient reports has not had. Requesting. 08/01/2018  MEDICARE YEARLY EXAM  
due 09/12/2018  COLONOSCOPY Nurse Did not address. 01/01/2019 Wt Readings from Last 3 Encounters:  
01/07/19 166 lb (75.3 kg) 10/15/18 160 lb (72.6 kg) 09/06/18 159 lb 6.3 oz (72.3 kg) Temp Readings from Last 3 Encounters:  
01/07/19 97.5 °F (36.4 °C) (Oral) 09/06/18 98.4 °F (36.9 °C)  
07/06/18 97 °F (36.1 °C) (Oral) BP Readings from Last 3 Encounters:  
01/07/19 124/64  
10/15/18 120/60  
09/06/18 109/55 Pulse Readings from Last 3 Encounters:  
01/07/19 69  
10/15/18 75  
09/06/18 70 Learning Assessment: 
:  
 
Learning Assessment 1/7/2019 7/6/2018 8/4/2015 4/7/2014 PRIMARY LEARNER Patient Patient Patient Patient HIGHEST LEVEL OF EDUCATION - PRIMARY LEARNER  - - > 4 YEARS OF COLLEGE > 4 YEARS OF COLLEGE  
BARRIERS PRIMARY LEARNER COGNITIVE COGNITIVE COGNITIVE NONE  
CO-LEARNER CAREGIVER Yes Yes Yes No  
 CO-LEARNER NAME   red billingsley -  
CO-LEARNER HIGHEST LEVEL OF EDUCATION - - > 4 YEARS [phone]  
PRIMARY LANGUAGE CO-LEARNER - - ENGLISH -  
 NEED - - No No  
LEARNER PREFERENCE PRIMARY DEMONSTRATION DEMONSTRATION LISTENING OTHER (COMMENT) LEARNER PREFERENCE CO-LEARNER - - OTHER (COMMENT) -  
LEARNING SPECIAL TOPICS - - no no ANSWERED BY patient patient patient patient RELATIONSHIP SELF SELF SELF SELF Depression Screening: 
:  
 
PHQ over the last two weeks 1/7/2019 Little interest or pleasure in doing things Not at all Feeling down, depressed, irritable, or hopeless Not at all Total Score PHQ 2 0 Trouble falling or staying asleep, or sleeping too much - Feeling tired or having little energy - Poor appetite, weight loss, or overeating - Feeling bad about yourself - or that you are a failure or have let yourself or your family down - Trouble concentrating on things such as school, work, reading, or watching TV - Moving or speaking so slowly that other people could have noticed; or the opposite being so fidgety that others notice - Thoughts of being better off dead, or hurting yourself in some way - Fall Risk Assessment: 
:  
 
Fall Risk Assessment, last 12 mths 1/7/2019 Able to walk? Yes Fall in past 12 months? No  
 
 
Abuse Screening: 
:  
 
Abuse Screening Questionnaire 1/7/2019 7/6/2018 9/23/2015 8/4/2015 4/7/2014 Do you ever feel afraid of your partner? N N (No Data) (No Data) N Are you in a relationship with someone who physically or mentally threatens you? N N (No Data) (No Data) N Is it safe for you to go home? Y Y (No Data) (No Data) Y Patient is accompanied by  I have received verbal consent from Shazia Winchester to discuss any/all medical information while they are present in the room. Patient opts for Flu ad (high dose) Vaccine today in office, per receiving order from provider Dr. Mike Aaron. All questions answered, consent form signed, and VIS given. 0.5 ML given in left deltoid, IM route, without difficulty, patient tolerated well. Patient was monitored for 15 minutes after administration with no complications. LOT: [id number] EXP: 6/30/19 Sofya Kaba [address]: [id number] SITE: LEFT DELTOID 
ROUTE: INTRAMUSCULAR

## 2019-01-08 PROBLEM — F32.A MILD DEPRESSION: Status: ACTIVE | Noted: 2019-01-08

## 2019-02-01 ENCOUNTER — TELEPHONE (OUTPATIENT)
Dept: INTERNAL MEDICINE CLINIC | Age: 74
End: 2019-02-01

## 2019-02-01 DIAGNOSIS — I69.359 CVA, OLD, HEMIPARESIS (HCC): Primary | ICD-10-CM

## 2019-02-01 DIAGNOSIS — M24.541 CONTRACTURE, RIGHT HAND: ICD-10-CM

## 2019-02-01 NOTE — TELEPHONE ENCOUNTER
----- Message from Murtaza Choudhury sent at 2/1/2019 12:27 PM EST -----  Regarding: Dr. Sigrid Allen  Micki Restrepo requesting a call back to verify if pt is due for occupational therapy orders. He will pick it up in the office. Best contact [phone].

## 2019-02-01 NOTE — TELEPHONE ENCOUNTER
Left generic VM on pt's home number advising the referral order has been approved. Encouraged to contact the office back with further questions/concerns.

## 2019-02-01 NOTE — TELEPHONE ENCOUNTER
Documentation from office visit notes on 01.07.19 reports that she has already been working with PT and OT. She wouldn't need another order, right?

## 2019-03-04 ENCOUNTER — TELEPHONE (OUTPATIENT)
Dept: INTERNAL MEDICINE CLINIC | Age: 74
End: 2019-03-04

## 2019-03-04 DIAGNOSIS — I69.122 DYSARTHRIA FOLLOWING NONTRAUMATIC INTRACEREBRAL HEMORRHAGE: Primary | ICD-10-CM

## 2019-03-04 DIAGNOSIS — I63.39 CEREBRAL INFARCTION DUE TO THROMBOSIS OF OTHER CEREBRAL ARTERY (HCC): ICD-10-CM

## 2019-03-04 NOTE — TELEPHONE ENCOUNTER
Patient's  would like a new order for speech therapy for [facility]. Please call him back at [phone] so that he can  the order.

## 2019-03-04 NOTE — TELEPHONE ENCOUNTER
Palmer Lints, MD Newt Shone, LPN 4 minutes ago (0:42 PM)     Order signed and ready to be picked up (Routing comment)      Attempt to contact patient at number listed in chart, no answer. Left voicemail for patient's , HIPAA verified, advising order for speech therapy is ready for . Order placed up front. To return call with questions/concerns.

## 2019-03-08 ENCOUNTER — TELEPHONE (OUTPATIENT)
Dept: INTERNAL MEDICINE CLINIC | Age: 74
End: 2019-03-08

## 2019-03-08 RX ORDER — ATORVASTATIN CALCIUM 20 MG/1
TABLET, FILM COATED ORAL
Qty: 90 TAB | Refills: 1 | Status: SHIPPED | OUTPATIENT
Start: 2019-03-08 | End: 2019-09-01 | Stop reason: SDUPTHER

## 2019-03-08 NOTE — TELEPHONE ENCOUNTER
Returned call to pt's  - verified pt's name and birth date. Mr. Esperanza Bird was provided with Dr. Carrie Shook recommendation and verbalized understanding.

## 2019-03-08 NOTE — TELEPHONE ENCOUNTER
Patient's  called, he states Mrs. Jesus Law has a growth in the roof of her mouth, and it is affecting how she chews/eats, but it is not painful. She did see a dentist and the dentist told her they could not do anything about the growth, Mr. Jesus Law would like to know what Dr. Radha Plummer suggest, please call.

## 2019-04-11 ENCOUNTER — TELEPHONE (OUTPATIENT)
Dept: NEUROLOGY | Age: 74
End: 2019-04-11

## 2019-04-11 NOTE — TELEPHONE ENCOUNTER
Just needing know if i Dr. Shantanu Doherty wants her to come on the blood thinner before pt's surgery next week. Or does she want her to stay on them.  Please advise

## 2019-04-12 NOTE — TELEPHONE ENCOUNTER
----- Message from Marci Choudhury sent at 4/12/2019  2:34 PM EDT -----  Regarding: DENISE Ramirez/You Gautam with [facility], is returning call to [place]. Best contact is   [phone]. They are in the office until 4pm today.

## 2019-04-12 NOTE — TELEPHONE ENCOUNTER
I have no idea what surgery is going to happen. However if performing surgery is the person who should tell her whether or not she needs to stop any medications temporarily. I cannot make that call.

## 2019-04-15 ENCOUNTER — TELEPHONE (OUTPATIENT)
Dept: NEUROLOGY | Age: 74
End: 2019-04-15

## 2019-04-15 NOTE — TELEPHONE ENCOUNTER
Cleotilde Mcburney calling to see if patient needs to be taken off of blood thinners before oral surgery.  Please call back

## 2019-04-16 NOTE — TELEPHONE ENCOUNTER
Based on what I understand she has some type of mass in her mouth that needs to have a biopsy/surgery done. As far as briefly discontinuing any anticoagulants or antiplatelets that will have to be decided by the surgeon performing the procedure as I do not know what type of procedure nor do I know the exact risks of bleeding from the procedure in question. It sounds like that this is a very medically necessary procedure that needs to be done. She should follow the surgeon recommendations and as soon as she is cleared to resume her medications she should do so immediately.

## 2019-04-17 NOTE — TELEPHONE ENCOUNTER
Pt has oral surgery on 4/23 and they are still waiting on a decision re pt being able to come off blood thinner. Tramaine Carver would like a call back today.

## 2019-04-17 NOTE — TELEPHONE ENCOUNTER
I informed patient's spouse that Dr. Cristin Keller has deferred this decision to surgeon.  (see previous encounter)

## 2019-04-20 RX ORDER — CLOPIDOGREL BISULFATE 75 MG/1
TABLET ORAL
Qty: 30 TAB | Refills: 0 | Status: SHIPPED | OUTPATIENT
Start: 2019-04-20 | End: 2019-10-14 | Stop reason: SDUPTHER

## 2019-05-29 ENCOUNTER — TELEPHONE (OUTPATIENT)
Dept: INTERNAL MEDICINE CLINIC | Age: 74
End: 2019-05-29

## 2019-05-29 NOTE — TELEPHONE ENCOUNTER
Pt  Tomeka Esteves who is on the permission to talk to called he wants to know of a psychologist   That his wife could see.   Best contact number for Ashvin Cagle is [phone]      LOV  1/7/19  NOV 7/12/19

## 2019-05-30 NOTE — TELEPHONE ENCOUNTER
Received an incoming call from patient's  [place]/HIPAA. Provided the name and number of [facility], [facility], and [facility]. Patient's  expressed gratitude for the information.    No further action required

## 2019-06-24 ENCOUNTER — OFFICE VISIT (OUTPATIENT)
Dept: NEUROLOGY | Age: 74
End: 2019-06-24

## 2019-06-24 VITALS
WEIGHT: 155 LBS | HEIGHT: 70 IN | HEART RATE: 64 BPM | BODY MASS INDEX: 22.19 KG/M2 | OXYGEN SATURATION: 97 % | RESPIRATION RATE: 16 BRPM | DIASTOLIC BLOOD PRESSURE: 78 MMHG | SYSTOLIC BLOOD PRESSURE: 100 MMHG

## 2019-06-24 DIAGNOSIS — I69.398 SPASTICITY AS LATE EFFECT OF CEREBROVASCULAR ACCIDENT (CVA): ICD-10-CM

## 2019-06-24 DIAGNOSIS — G40.209 PARTIAL SYMPTOMATIC EPILEPSY WITH COMPLEX PARTIAL SEIZURES, NOT INTRACTABLE, WITHOUT STATUS EPILEPTICUS (HCC): Primary | ICD-10-CM

## 2019-06-24 DIAGNOSIS — R25.2 SPASTICITY AS LATE EFFECT OF CEREBROVASCULAR ACCIDENT (CVA): ICD-10-CM

## 2019-06-24 RX ORDER — DIVALPROEX SODIUM 500 MG/1
500 TABLET, DELAYED RELEASE ORAL 2 TIMES DAILY
Qty: 180 TAB | Refills: 3 | Status: SHIPPED | OUTPATIENT
Start: 2019-06-24 | End: 2020-07-08 | Stop reason: SDUPTHER

## 2019-06-24 NOTE — PROGRESS NOTES
Chief Complaint   Patient presents with    Stroke       HPI    44-year-old woman here to follow-up. 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 She has post stroke symptomatic epilepsy. Since I saw her last she has been doing very well. She had a surgical procedure for a growth in her mouth that was found to be benign. She continues to see primary care and Occupational Therapy. No seizures breaking through. She is going to be establishing with individual therapy at [address] [facility] very soon. History of psychiatric admissions for mood and behavioral concerns. She remains on Depakote. Mood is stable.         Review of Systems   Unable to perform ROS: Language       Past Medical History:   Diagnosis Date    Anxiety     Depression     Hypovitaminosis D 02/12    Menopause 2002    [facility]     Stroke [facility] [facility]     Thromboembolus (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     Family History   Problem Relation Age of Onset    Stroke Mother 67        Cerebral hemorrhage    Cancer Sister 52        Breast    Breast Cancer Sister 52    Diabetes Father      Social History     Socioeconomic History    Marital status:      Spouse name: Not on file    Number of children: Not on file    Years of education: Not on file    Highest education level: Not on file   Occupational History    Not on file   Social Needs    Financial resource strain: Not on file    Food insecurity:     Worry: Not on file     Inability: Not on file    Transportation needs:     Medical: Not on file     Non-medical: Not on file   Tobacco Use    Smoking status: Never Smoker    Smokeless tobacco: Never Used   Substance and Sexual Activity    Alcohol use: No     Alcohol/week: 0.0 oz    Drug use: No     Types: OTC    Sexual activity: Not Currently     Partners: Male   Lifestyle    Physical activity:     Days per week: Not on file     Minutes per session: Not on file    Stress: Not on file   Relationships    Social connections:     Talks on phone: Not on file     Gets together: Not on file     Attends Oriental orthodox service: Not on file     Active member of club or organization: Not on file     Attends meetings of clubs or organizations: Not on file     Relationship status: Not on file    Intimate partner violence:     Fear of current or ex partner: Not on file     Emotionally abused: Not on file     Physically abused: Not on file     Forced sexual activity: Not on file   Other Topics Concern    Not on file   Social History Narrative    Not on file     Allergies   Allergen Reactions    Rofecoxib Hives and Rash     Other reaction(s): Hives, Hives    Aleve [Naproxen Sodium] Nausea and Vomiting    Motrin [Ibuprofen] Nausea and Vomiting    Percocet [Oxycodone-Acetaminophen] Other (comments)     Other reaction(s): Hallucinations (finding)  hallucinations         Current Outpatient Medications   Medication Sig    divalproex DR (DEPAKOTE) 500 mg tablet Take 1 Tab by mouth two (2) times a day.  levothyroxine (SYNTHROID) 50 mcg tablet TAKE 1 TAB BY MOUTH DAILY (BEFORE BREAKFAST).  clopidogrel (PLAVIX) 75 mg tab TAKE 1 TAB DAILY,INSTR:PT NEEDS APPT ASAP    atorvastatin (LIPITOR) 20 mg tablet TAKE 1 TABLET BY MOUTH EVERY DAY    sertraline (ZOLOFT) 100 mg tablet TAKE 2 TABLETS BY MOUTH DAILY    OTHER,NON-FORMULARY, Diagnosis: right upper extremity weakness,hand spasm,stiffness from previous stroke      Occupation therapy: please evaluate and treat.  mv-min/iron/folic/calcium/vitK (WOMEN'S MULTIVITAMIN PO) Take  by mouth.  fish oil-omega-3 fatty acids 340-1,000 mg capsule Take 1 Cap by mouth daily.  Calcium Carbonate-Vit D3-Min (CALTRATE 600+D PLUS MINERALS) 600 mg calcium- 400 unit tab Take 1 Tab by mouth daily. No current facility-administered medications for this visit.             Neurologic Exam     Mental Status   WD/WN adult in NAD, normal grooming  VSS  A&O, follows commands, very pleasant    PERRL, nonicteric  Face is asymmetric, tongue midline  Speech is nonfluent and fragmented  No limb ataxia. No abnl movements. Right hand paretic with splint  Moving all extemities spontaneously and symmetric  Normal gait    CVS RRR  Lungs nonlabored  Skin is warm and dry         Visit Vitals  /78   Pulse 64   Resp 16   Ht 5' 10\" (1.778 m)   Wt 70.3 kg (155 lb)   SpO2 97%   BMI 22.24 kg/m²       Assessment and Plan   Diagnoses and all orders for this visit:    1. Partial symptomatic epilepsy with complex partial seizures, not intractable, without status epilepticus (HCC)  -     HEPATIC FUNCTION PANEL  -     VALPROIC ACID    2. Spasticity as late effect of cerebrovascular accident (CVA)    Other orders  -     divalproex DR (DEPAKOTE) 500 mg tablet; Take 1 Tab by mouth two (2) times a day. 72-year-old woman who has stroke with residual right hemiparesis and aphasia who also has subsequent epilepsy. No seizures. Check Depakote level today as well as LFTs. No change to medication. Continue OT. She has made great strides in her recovery since onset of symptoms. She still struggles with her language but is able to convey her thoughts most of the time. Agree with individual therapy. Greater than 50% of this 20 minute visit was spent counseling about her overall disease state, progress thus far, current medications and plan moving forward. I reviewed and decided to continue the current medications.       2 [facility], [address]  Diplomate PANCHO

## 2019-06-24 NOTE — PROGRESS NOTES
Pt here for follow up from her stroke. She is doing ok. Going to a therapist at [facility] later this week.

## 2019-06-29 LAB
ALBUMIN SERPL-MCNC: 4.1 G/DL (ref 3.5–4.8)
ALP SERPL-CCNC: 53 IU/L (ref 39–117)
ALT SERPL-CCNC: 9 IU/L (ref 0–32)
AST SERPL-CCNC: 10 IU/L (ref 0–40)
BILIRUB DIRECT SERPL-MCNC: 0.08 MG/DL (ref 0–0.4)
BILIRUB SERPL-MCNC: <0.2 MG/DL (ref 0–1.2)
PROT SERPL-MCNC: 6.8 G/DL (ref 6–8.5)
VALPROATE SERPL-MCNC: 73 UG/ML (ref 50–100)

## 2019-07-12 ENCOUNTER — OFFICE VISIT (OUTPATIENT)
Dept: INTERNAL MEDICINE CLINIC | Age: 74
End: 2019-07-12

## 2019-07-12 ENCOUNTER — HOSPITAL ENCOUNTER (OUTPATIENT)
Dept: LAB | Age: 74
Discharge: HOME | End: 2019-07-12
Payer: MEDICARE

## 2019-07-12 VITALS
RESPIRATION RATE: 16 BRPM | WEIGHT: 158 LBS | TEMPERATURE: 97.9 F | SYSTOLIC BLOOD PRESSURE: 116 MMHG | BODY MASS INDEX: 22.67 KG/M2 | HEART RATE: 67 BPM | DIASTOLIC BLOOD PRESSURE: 60 MMHG | OXYGEN SATURATION: 97 %

## 2019-07-12 DIAGNOSIS — E03.9 HYPOTHYROIDISM, ADULT: Primary | ICD-10-CM

## 2019-07-12 DIAGNOSIS — F32.A MILD DEPRESSION: ICD-10-CM

## 2019-07-12 DIAGNOSIS — R73.09 ELEVATED GLUCOSE: ICD-10-CM

## 2019-07-12 PROCEDURE — 84443 ASSAY THYROID STIM HORMONE: CPT

## 2019-07-12 PROCEDURE — 36415 COLL VENOUS BLD VENIPUNCTURE: CPT

## 2019-07-12 PROCEDURE — 84439 ASSAY OF FREE THYROXINE: CPT

## 2019-07-12 PROCEDURE — 80061 LIPID PANEL: CPT

## 2019-07-12 PROCEDURE — 85025 COMPLETE CBC W/AUTO DIFF WBC: CPT

## 2019-07-12 PROCEDURE — 83036 HEMOGLOBIN GLYCOSYLATED A1C: CPT

## 2019-07-12 PROCEDURE — 80053 COMPREHEN METABOLIC PANEL: CPT

## 2019-07-12 NOTE — PROGRESS NOTES
Subjective:      Basilio Garcia is a 72 y.o. female who presents today for follow up of her hypothyroid and elevated glucose. She is following with Dr. Gely Espinoza for her seizure disorder and hx of hemorrhagic strokes. She has contracture of her right hand as a result of her strokes. Has been going to OT at [facility]. Depression is controlled with her medications. Will also be going to counseling. She continues to work on her own on her speech and writing. She did not have any new concerns today. 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 180 Tab 3    levothyroxine (SYNTHROID) 50 mcg tablet TAKE 1 TAB BY MOUTH DAILY (BEFORE BREAKFAST).  90 Tab 0    clopidogrel (PLAVIX) 75 mg tab TAKE 1 TAB DAILY,INSTR:PT NEEDS APPT ASAP 30 Tab 0    atorvastatin (LIPITOR) 20 mg tablet TAKE 1 TABLET BY MOUTH EVERY DAY 90 Tab 1    sertraline (ZOLOFT) 100 mg tablet TAKE 2 TABLETS BY MOUTH DAILY 180 Tab 1    mv-min/iron/folic/calcium/vitK (WOMEN'S MULTIVITAMIN PO) Take  by mouth.  fish oil-omega-3 fatty acids 340-1,000 mg capsule Take 1 Cap by mouth daily.  Calcium Carbonate-Vit D3-Min (CALTRATE 600+D PLUS MINERALS) 600 mg calcium- 400 unit tab Take 1 Tab by mouth daily.  OTHER,NON-FORMULARY, Diagnosis: right upper extremity weakness,hand spasm,stiffness from previous stroke      Occupation therapy: please evaluate and treat. 1 Each 0        Review of Systems    Pertinent items are noted in HPI. Objective:     Visit Vitals  /60 (BP 1 Location: Left arm, BP Patient Position: Sitting)   Pulse 67   Temp 97.9 °F (36.6 °C) (Oral)   Resp 16   Wt 158 lb (71.7 kg) Comment: wearing brace on hand,   SpO2 97%   BMI 22.67 kg/m²     General appearance: alert, cooperative, no distress, appears stated age. Still has difficulty expressing her words, but improved from last visit. Slightly more fluent. Head: Normocephalic, without obvious abnormality, atraumatic  Neck: supple, symmetrical, trachea midline, no adenopathy, thyroid: not enlarged, symmetric, no tenderness/mass/nodules, no carotid bruit and no JVD  Lungs: clear to auscultation bilaterally  Heart: regular rate and rhythm, S1, S2 normal, no murmur, click, rub or gallop  Extremities: no edema. Right hand in brace for contracture. +2 radial pulses. +2 PT pulses      Assessment/Plan:     1. Hypothyroidism, adult  -clinically euthyroid    - TSH 3RD GENERATION  - LIPID PANEL  - METABOLIC PANEL, COMPREHENSIVE  - CBC WITH AUTOMATED DIFF  - T4, FREE    2. Elevated glucose  -continue to monitor diet.     - HEMOGLOBIN A1C WITH EAG    3. Mild depression (HCC)  -stable at this time. 4. Seizure disorder secondary to hx of multiple CVA  -controlled on depakote  -continue per neurology.      Orders Placed This Encounter    TSH 3RD GENERATION    LIPID PANEL    METABOLIC PANEL, COMPREHENSIVE    CBC WITH AUTOMATED DIFF    T4, FREE    HEMOGLOBIN A1C WITH EAG Follow-up Disposition:     Follow up in 6 months     Return if symptoms worsen or fail to improve. Advised patient to call back or return to office if symptoms worsen/change/persist.     Discussed expected course/resolution/complications of diagnosis in detail with patient. Medication risks/benefits/costs/interactions/alternatives discussed with patient. Patient was given an after visit summary which includes diagnoses, current medications, & vitals. Patient expressed understanding with the diagnosis and plan.

## 2019-07-13 LAB
ALBUMIN SERPL-MCNC: 4.4 G/DL (ref 3.5–4.8)
ALBUMIN/GLOB SERPL: 1.8 {RATIO} (ref 1.2–2.2)
ALP SERPL-CCNC: 49 IU/L (ref 39–117)
ALT SERPL-CCNC: 8 IU/L (ref 0–32)
AST SERPL-CCNC: 11 IU/L (ref 0–40)
BASOPHILS # BLD AUTO: 0 X10E3/UL (ref 0–0.2)
BASOPHILS NFR BLD AUTO: 0 %
BILIRUB SERPL-MCNC: 0.3 MG/DL (ref 0–1.2)
BUN SERPL-MCNC: 12 MG/DL (ref 8–27)
BUN/CREAT SERPL: 11 (ref 12–28)
CALCIUM SERPL-MCNC: 9.6 MG/DL (ref 8.7–10.3)
CHLORIDE SERPL-SCNC: 109 MMOL/L (ref 96–106)
CHOLEST SERPL-MCNC: 127 MG/DL (ref 100–199)
CO2 SERPL-SCNC: 23 MMOL/L (ref 20–29)
CREAT SERPL-MCNC: 1.11 MG/DL (ref 0.57–1)
EOSINOPHIL # BLD AUTO: 0 X10E3/UL (ref 0–0.4)
EOSINOPHIL NFR BLD AUTO: 0 %
ERYTHROCYTE [DISTWIDTH] IN BLOOD BY AUTOMATED COUNT: 15.6 % (ref 12.3–15.4)
EST. AVERAGE GLUCOSE BLD GHB EST-MCNC: 123 MG/DL
GLOBULIN SER CALC-MCNC: 2.4 G/DL (ref 1.5–4.5)
GLUCOSE SERPL-MCNC: 86 MG/DL (ref 65–99)
HBA1C MFR BLD: 5.9 % (ref 4.8–5.6)
HCT VFR BLD AUTO: 38.2 % (ref 34–46.6)
HDLC SERPL-MCNC: 63 MG/DL
HGB BLD-MCNC: 12.4 G/DL (ref 11.1–15.9)
IMM GRANULOCYTES # BLD AUTO: 0 X10E3/UL (ref 0–0.1)
IMM GRANULOCYTES NFR BLD AUTO: 0 %
INTERPRETATION, 910389: NORMAL
INTERPRETATION: NORMAL
LDLC SERPL CALC-MCNC: 52 MG/DL (ref 0–99)
LYMPHOCYTES # BLD AUTO: 1.8 X10E3/UL (ref 0.7–3.1)
LYMPHOCYTES NFR BLD AUTO: 32 %
MCH RBC QN AUTO: 28.7 PG (ref 26.6–33)
MCHC RBC AUTO-ENTMCNC: 32.5 G/DL (ref 31.5–35.7)
MCV RBC AUTO: 88 FL (ref 79–97)
MONOCYTES # BLD AUTO: 0.7 X10E3/UL (ref 0.1–0.9)
MONOCYTES NFR BLD AUTO: 12 %
NEUTROPHILS # BLD AUTO: 3.1 X10E3/UL (ref 1.4–7)
NEUTROPHILS NFR BLD AUTO: 56 %
PDF IMAGE, 910387: NORMAL
PLATELET # BLD AUTO: 122 X10E3/UL (ref 150–450)
POTASSIUM SERPL-SCNC: 4.5 MMOL/L (ref 3.5–5.2)
PROT SERPL-MCNC: 6.8 G/DL (ref 6–8.5)
RBC # BLD AUTO: 4.32 X10E6/UL (ref 3.77–5.28)
SODIUM SERPL-SCNC: 146 MMOL/L (ref 134–144)
T4 FREE SERPL-MCNC: 1.09 NG/DL (ref 0.82–1.77)
TRIGL SERPL-MCNC: 61 MG/DL (ref 0–149)
TSH SERPL DL<=0.005 MIU/L-ACNC: 2 UIU/ML (ref 0.45–4.5)
VLDLC SERPL CALC-MCNC: 12 MG/DL (ref 5–40)
WBC # BLD AUTO: 5.6 X10E3/UL (ref 3.4–10.8)

## 2019-07-20 DIAGNOSIS — F32.A DEPRESSION, UNSPECIFIED DEPRESSION TYPE: ICD-10-CM

## 2019-07-22 ENCOUNTER — HOSPITAL ENCOUNTER (OUTPATIENT)
Dept: MAMMOGRAPHY | Age: 74
Discharge: HOME | End: 2019-07-22
Attending: INTERNAL MEDICINE
Payer: MEDICARE

## 2019-07-22 DIAGNOSIS — Z12.39 SCREENING BREAST EXAMINATION: ICD-10-CM

## 2019-07-22 PROCEDURE — 77067 SCR MAMMO BI INCL CAD: CPT

## 2019-07-23 RX ORDER — SERTRALINE HYDROCHLORIDE 100 MG/1
TABLET, FILM COATED ORAL
Qty: 180 TAB | Refills: 1 | Status: SHIPPED | OUTPATIENT
Start: 2019-07-23 | End: 2020-01-20

## 2019-09-01 RX ORDER — ATORVASTATIN CALCIUM 20 MG/1
TABLET, FILM COATED ORAL
Qty: 90 TAB | Refills: 1 | Status: SHIPPED | OUTPATIENT
Start: 2019-09-01 | End: 2020-01-12

## 2019-10-01 ENCOUNTER — TELEPHONE (OUTPATIENT)
Dept: INTERNAL MEDICINE CLINIC | Age: 74
End: 2019-10-01

## 2019-10-01 DIAGNOSIS — I69.122 DYSARTHRIA FOLLOWING NONTRAUMATIC INTRACEREBRAL HEMORRHAGE: Primary | ICD-10-CM

## 2019-10-01 NOTE — TELEPHONE ENCOUNTER
Returned call to pt's  on his cell number listed to obtain clearer information. Mr. Jerry Ortega did not answer the call and VM box advised it was full at the time of call and unable to accept messages.

## 2019-10-01 NOTE — TELEPHONE ENCOUNTER
----- Message from Markos Bullock sent at 10/1/2019 10:21 AM EDT -----  Regarding: RE: Dr. Asaf Allen       ----- Message -----  From: Lorri Lara  Sent: 10/1/2019  10:13 AM EDT  To: [facility] Office Pool  Subject: Dr. Asaf Allen                                 Referral    Caller (first and last name if not the patient or from practice):      Caller's relationship to patient (if not from a practice): Spouse      Name of caller (if calling from a practice): Lalita Verduzco  [phone])      Name of practice: [address] [place] [address]     Specialist's title, first, and last name:      Office Phone Number:[phone]      Fax number:      Date and time of appointment: On going for Speech Therapy       Reason for appointment:Pt is completing speech therapy at the location and  all information missing should be on file. Pt needs a new referral foe this location.  is willing to come in a  the referral on Wednesday when it is completed.        Details to clarify the request:      Obie Richardson

## 2019-10-03 NOTE — TELEPHONE ENCOUNTER
Patient's  advised after verifying name and  referral has been printed. He states that he will like to  referral from office. Advised of new office name and location.

## 2019-10-14 RX ORDER — CLOPIDOGREL BISULFATE 75 MG/1
TABLET ORAL
Qty: 90 TAB | Refills: 0 | Status: SHIPPED | OUTPATIENT
Start: 2019-10-14 | End: 2020-01-13 | Stop reason: SDUPTHER

## 2019-10-14 NOTE — TELEPHONE ENCOUNTER
Last refill- 4/20/19  Last office visit - 7/12/19  Next office visit -   Future Appointments   Date Time Provider Dave Jaramillo   1/13/2020 11:20 AM MD LORI Adamson         Requested Prescriptions     Pending Prescriptions Disp Refills    clopidogrel (PLAVIX) 75 mg tab 90 Tab 0     Sig: TAKE 1 TAB DAILY

## 2019-10-14 NOTE — TELEPHONE ENCOUNTER
----- Message from Rosa Slider sent at 10/14/2019 10:24 AM EDT -----  Regarding: Dr. Lopez/ Refill  Contact: [phone]  [address] [place] [address] (if not patient):  Kirstie Syed (SPOUSE)  Relationship of caller (if not patient):   Best contact number(s): [phone]  Name of medication and dosage if known: ( Clopidogrel 75 mg)  Is patient out of this medication (yes/no): yes  Pharmacy name: [address] [place] [address] Pharmacy listed in chart? (yes/no): yes  Pharmacy phone number: on fine  Date of last visit: Unknown  Details to clarify the request: Pt requesting refill be sent to pharmacy.

## 2020-01-12 RX ORDER — ATORVASTATIN CALCIUM 20 MG/1
TABLET, FILM COATED ORAL
Qty: 90 TAB | Refills: 1 | Status: SHIPPED | OUTPATIENT
Start: 2020-01-12 | End: 2020-01-14

## 2020-01-13 ENCOUNTER — HOSPITAL ENCOUNTER (OUTPATIENT)
Dept: LAB | Age: 75
Discharge: HOME | End: 2020-01-13
Payer: MEDICARE

## 2020-01-13 ENCOUNTER — OFFICE VISIT (OUTPATIENT)
Dept: INTERNAL MEDICINE CLINIC | Age: 75
End: 2020-01-13

## 2020-01-13 VITALS
BODY MASS INDEX: 23.05 KG/M2 | RESPIRATION RATE: 16 BRPM | DIASTOLIC BLOOD PRESSURE: 60 MMHG | SYSTOLIC BLOOD PRESSURE: 100 MMHG | OXYGEN SATURATION: 98 % | WEIGHT: 161 LBS | HEIGHT: 70 IN | HEART RATE: 78 BPM | TEMPERATURE: 97.8 F

## 2020-01-13 DIAGNOSIS — Z13.39 SCREENING FOR ALCOHOLISM: ICD-10-CM

## 2020-01-13 DIAGNOSIS — M24.541 CONTRACTURE, RIGHT HAND: ICD-10-CM

## 2020-01-13 DIAGNOSIS — Z79.899 ENCOUNTER FOR LONG-TERM (CURRENT) USE OF MEDICATIONS: ICD-10-CM

## 2020-01-13 DIAGNOSIS — R73.09 ELEVATED GLUCOSE: ICD-10-CM

## 2020-01-13 DIAGNOSIS — E03.9 HYPOTHYROIDISM, ADULT: ICD-10-CM

## 2020-01-13 DIAGNOSIS — I69.322 CVA, OLD, DYSARTHRIA: ICD-10-CM

## 2020-01-13 DIAGNOSIS — Z13.31 SCREENING FOR DEPRESSION: ICD-10-CM

## 2020-01-13 DIAGNOSIS — Z00.00 MEDICARE ANNUAL WELLNESS VISIT, SUBSEQUENT: Primary | ICD-10-CM

## 2020-01-13 DIAGNOSIS — F32.A DEPRESSION, UNSPECIFIED DEPRESSION TYPE: ICD-10-CM

## 2020-01-13 PROCEDURE — 83036 HEMOGLOBIN GLYCOSYLATED A1C: CPT

## 2020-01-13 PROCEDURE — 85025 COMPLETE CBC W/AUTO DIFF WBC: CPT

## 2020-01-13 PROCEDURE — 36415 COLL VENOUS BLD VENIPUNCTURE: CPT

## 2020-01-13 PROCEDURE — 80053 COMPREHEN METABOLIC PANEL: CPT

## 2020-01-13 PROCEDURE — 84443 ASSAY THYROID STIM HORMONE: CPT

## 2020-01-13 PROCEDURE — 84439 ASSAY OF FREE THYROXINE: CPT

## 2020-01-13 RX ORDER — CLOPIDOGREL BISULFATE 75 MG/1
TABLET ORAL
Qty: 90 TAB | Refills: 1 | Status: SHIPPED | OUTPATIENT
Start: 2020-01-13 | End: 2020-07-06

## 2020-01-13 NOTE — PATIENT INSTRUCTIONS

## 2020-01-13 NOTE — PROGRESS NOTES
Department of Anesthesiology  Postprocedure Note    Patient: Jose Ramachandran  MRN: [id number]  YOB: 1990  Date of evaluation: 11/28/2017  Time:  1:02 PM     Procedure Summary     Date:  11/28/17 Room / Location:  [address] OR    Anesthesia Start:  1227 Anesthesia Stop:      Procedure:  DILATATION AND CURETTAGE, PAT ON ADMIT (N/A ) Diagnosis:  (MISSED AB )    Surgeon:  Ayo Walsh MD Responsible Provider:  Arcelia Sen MD    Anesthesia Type:  general ASA Status:  1          Anesthesia Type: general    Adrian Phase I: Adrian Score: 10    Adrian Phase II:      Last vitals: Reviewed and per EMR flowsheets.        Anesthesia Post Evaluation    Patient location during evaluation: PACU  Patient participation: complete - patient participated  Level of consciousness: awake and alert  Pain score: 0  Airway patency: patent  Nausea & Vomiting: no nausea  Complications: no  Cardiovascular status: hemodynamically stable  Respiratory status: acceptable  Hydration status: stable Subjective:      Radha Schwartz is a 69 y.o. female who presents today for follow up of her hypothyroid, depression, .hyperlipidemia and elevated glucose. She has contracture of her right hand due to her hx of strokes. Working with OT at [facility]. Her neurologist is Dr. Bre Masterson for her seizure disorder and hx of strokes. She has right hemiparesis and expressive aphagia. Depression - she is not going to counselor and no longer following with psychiatrist who moved. She is very tearful and frustrated today. We moved offices and this is a new surrounding for her. Her  also had a difficult time getting her moving and ready to come to visit. She didn't like to be rushed and felt that her  was not being patient.  ';  /[    She is due for her Medicare Wellness Exam today.      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    Pseudobulbar affect F48.2    Right arm weakness R29.898    Seizure ([address] R56.9    Toxoplasma chorioretinitis, 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 90 Tab 1    sertraline (ZOLOFT) 100 mg tablet TAKE 2 TABLETS BY MOUTH DAILY 180 Tab 1    divalproex DR (DEPAKOTE) 500 mg tablet Take 1 Tab by mouth two (2) times a day. 180 Tab 3    mv-min/iron/folic/calcium/vitK (WOMEN'S MULTIVITAMIN PO) Take  by mouth.  fish oil-omega-3 fatty acids 340-1,000 mg capsule Take 1 Cap by mouth daily.  Calcium Carbonate-Vit D3-Min (CALTRATE 600+D PLUS MINERALS) 600 mg calcium- 400 unit tab Take 1 Tab by mouth daily.  OTHER,NON-FORMULARY, Diagnosis: right upper extremity weakness,hand spasm,stiffness from previous stroke      Occupation therapy: please evaluate and treat. 1 Each 0        Review of Systems    Pertinent items are noted in HPI. Objective:     Visit Vitals  /60 (BP 1 Location: Left arm, BP Patient Position: Sitting)   Pulse 78   Temp 97.8 °F (36.6 °C) (Oral)   Resp 16   Ht 5' 9.5\" (1.765 m)   Wt 161 lb (73 kg)   SpO2 98%   BMI 23.43 kg/m²     General appearance: alert, cooperative, mild distress, appears stated age. Answers questions correctly, but has a very difficult time finding the correct words and to express them  Head: Normocephalic, without obvious abnormality, atraumatic  Neck: supple, symmetrical, trachea midline, no adenopathy, no carotid bruit and no JVD  Lungs: clear to auscultation bilaterally  Heart: regular rate and rhythm, S1, S2 normal, no murmur, click, rub or gallop  Extremities: right hand with contracture. Minimal flex at wrist. Slight ability to wiggle her finger. Pulses: 2+ and symmetric    Assessment/Plan:     1. Medicare annual wellness visit, subsequent    - [place] AN[place] ALCOH[place] SCREEN [address]    2. Screening for alcoholism    - [place] ANNUAL ALCOHOL SCREEN 15 MIN    3. Screening for depression    - DEPRESSION SCREEN ANNUAL    4. Hypothyroidism, adult  -appears euthyroid  -check TSH today.      - CBC WITH AUTOMATED DIFF  - METABOLIC PANEL, COMPREHENSIVE  - TSH 3RD GENERATION  - T4, FREE    5. Depression, unspecified depression type  -patient frustrated and tearful today. -recommended that she reconnects with the psychiatrists at [facility]. Also recommended counseling. 6. Elevated glucose  -following low sugar and carb diet. - METABOLIC PANEL, COMPREHENSIVE  - HEMOGLOBIN A1C WITH EAG    7. Encounter for long-term (current) use of medications      8. CVA, old, dysarthria  -encouraged her to continue with speech therapy.     - REFERRAL TO SPEECH THERAPY    9. Contracture, right hand  -working closely with OT.     - REFERRAL TO OCCUPATIONAL THERAPY     Follow-up Disposition:     Follow up in 6 months     Return if symptoms worsen or fail to improve. Advised patient to call back or return to office if symptoms worsen/change/persist.     Discussed expected course/resolution/complications of diagnosis in detail with patient. Medication risks/benefits/costs/interactions/alternatives discussed with patient. Patient was given an after visit summary which includes diagnoses, current medications, & vitals. Patient expressed understanding with the diagnosis and plan. This is the Subsequent Medicare Annual Wellness Exam, performed 12 months or more after the Initial AWV or the last Subsequent AWV    I have reviewed the patient's medical history in detail and updated the computerized patient record.      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    Pseudobulbar affect F48.2    Right arm weakness R29.898    Seizure (HCC) R56.9    Toxoplasma chorioretinitis, 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    TIA (transient ischemic attack) G45.9    Depression F32.9    Cerebrovascular accident (CVA) due to thrombosis (HCC) I63.9    Sudden severe confusion due to brain dysfunction caused by illness R41.0    Mild depression ([address] F32.0     Past Medical History:   Diagnosis Date    Anxiety     Depression     Hypovitaminosis D 02/12    Menopause 2002    Seizures ([address]     Stroke ([address] 2014    Stroke ([address] 2017    Thromboembolus ([address] 1975     RLE    Thyroid disease     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 shoulders after fall    HX TUBAL LIGATION  1975    VASCULAR SURGERY PROCEDURE UNLIST  1975    varicose vein stripping Right leg     Current Outpatient Medications   Medication Sig Dispense Refill    atorvastatin (LIPITOR) 20 mg tablet TAKE 1 TABLET BY MOUTH EVERY DAY 90 Tab 1    clopidogrel (PLAVIX) 75 mg tab TAKE 1 TAB DAILY 90 Tab 0    levothyroxine (SYNTHROID) 50 mcg tablet TAKE 1 TAB BY MOUTH DAILY (BEFORE BREAKFAST). 90 Tab 1    sertraline (ZOLOFT) 100 mg tablet TAKE 2 TABLETS BY MOUTH DAILY 180 Tab 1    divalproex DR (DEPAKOTE) 500 mg tablet Take 1 Tab by mouth two (2) times a day. 180 Tab 3    mv-min/iron/folic/calcium/vitK (WOMEN'S MULTIVITAMIN PO) Take  by mouth.  fish oil-omega-3 fatty acids 340-1,000 mg capsule Take 1 Cap by mouth daily.  Calcium Carbonate-Vit D3-Min (CALTRATE 600+D PLUS MINERALS) 600 mg calcium- 400 unit tab Take 1 Tab by mouth daily.  OTHER,NON-FORMULARY, Diagnosis: right upper extremity weakness,hand spasm,stiffness from previous stroke      Occupation therapy: please evaluate and treat.  1 Each 0     Allergies   Allergen Reactions    Rofecoxib Hives and Rash Other reaction(s): Hives, Hives    Aleve [Naproxen Sodium] Nausea and Vomiting    Motrin [Ibuprofen] Nausea and Vomiting    Percocet [Oxycodone-Acetaminophen] Other (comments)     Other reaction(s): Hallucinations (finding)  hallucinations       Family History   Problem Relation Age of Onset    Stroke Mother 67        Cerebral hemorrhage    Cancer Sister 52        Breast    Breast Cancer Sister 52    Diabetes Father      Social History     Tobacco Use    Smoking status: Never Smoker    Smokeless tobacco: Never Used   Substance Use Topics    Alcohol use: No     Alcohol/week: 0.0 standard drinks       Depression Risk Factor Screening:     3 most recent PHQ Screens 1/7/2019   Little interest or pleasure in doing things Not at all   Feeling down, depressed, irritable, or hopeless Not at all   Total Score PHQ 2 0   Trouble falling or staying asleep, or sleeping too much -   Feeling tired or having little energy -   Poor appetite, weight loss, or overeating -   Feeling bad about yourself - or that you are a failure or have let yourself or your family down -   Trouble concentrating on things such as school, work, reading, or watching TV -   Moving or speaking so slowly that other people could have noticed; or the opposite being so fidgety that others notice -   Thoughts of being better off dead, or hurting yourself in some way -       Alcohol Risk Factor Screening:   Do you average 1 drink per night or more than 7 drinks a week:  No    On any one occasion in the past three months have you have had more than 3 drinks containing alcohol:  No      Functional Ability and Level of Safety:   Hearing: Hearing is good. Activities of Daily Living:   The home contains: handrails and grab bars  Patient needs help with:  transportation, shopping, preparing meals, laundry, housework, managing medications and managing money    Ambulation: with no difficulty    Fall Risk:  Fall Risk Assessment, last 12 mths 6/24/2019 Able to walk? -   Fall in past 12 months? No       Abuse Screen:  Patient is not abused    Cognitive Screening   Has your family/caregiver stated any concerns about your memory: yes - patient has significant dyarthria and depression due to cva      Patient Care Team   Patient Care Team:  Trish Arauz MD as PCP - General (Internal Medicine)  Trish Arauz MD as PCP - [facility] Empaneled Provider  Leandro Holland MD (Ophthalmology)  Harish Castillo DO (Neurology)    Assessment/Plan   Education and counseling provided:  Are appropriate based on today's review and evaluation    Diagnoses and all orders for this visit:    1. Medicare annual wellness visit, subsequent  -     [place] ANNUAL ALCOHOL SCREEN [address]    2.  Screening for alcoholism  -     [place] ANNUAL ALCOHOL SCREEN 15 MIN    3. Screening for depression  -     [place] Maintenance Due   Topic Date Due    Shingrix Vaccine Age 50> (2 of 2) 12/06/2019

## 2020-01-13 NOTE — PROGRESS NOTES
Verified name and birth date for privacy precautions. Chart reviewed in preparation for today's visit. Chief Complaint   Patient presents with    Thyroid Problem          Health Maintenance Due   Topic    Shingrix Vaccine Age 50> (2 of 2)    MEDICARE YEARLY EXAM          Wt Readings from Last 3 Encounters:   01/13/20 161 lb (73 kg)   07/12/19 158 lb (71.7 kg)   06/24/19 155 lb (70.3 kg)     Temp Readings from Last 3 Encounters:   01/13/20 97.8 °F (36.6 °C) (Oral)   07/12/19 97.9 °F (36.6 °C) (Oral)   01/07/19 97.5 °F (36.4 °C) (Oral)     BP Readings from Last 3 Encounters:   01/13/20 100/60   07/12/19 116/60   06/24/19 100/78     Pulse Readings from Last 3 Encounters:   01/13/20 78   07/12/19 67   06/24/19 64         Assesments not updated during this visit       1) Have you been to an emergency room, urgent care clinic since your last visit? no   Hospitalized since your last visit? no             2) Have you seen or consulted any other health care providers outside of [address] [place] [address] since your last visit? no  (Include any pap smears or colon screenings in this section.)    3) Do you have an Advance Directive on file? yes      Patient is currently accompanied by her  & I have received verbal consent from Radha Schwartz to discuss any/all medical information while he/she is present in the room.     ------------------------------------------------

## 2020-01-14 LAB
ALBUMIN SERPL-MCNC: 4.3 G/DL (ref 3.5–4.8)
ALBUMIN/GLOB SERPL: 1.7 {RATIO} (ref 1.2–2.2)
ALP SERPL-CCNC: 58 IU/L (ref 39–117)
ALT SERPL-CCNC: 8 IU/L (ref 0–32)
AST SERPL-CCNC: 15 IU/L (ref 0–40)
BASOPHILS # BLD AUTO: 0 X10E3/UL (ref 0–0.2)
BASOPHILS NFR BLD AUTO: 0 %
BILIRUB SERPL-MCNC: 0.2 MG/DL (ref 0–1.2)
BUN SERPL-MCNC: 17 MG/DL (ref 8–27)
BUN/CREAT SERPL: 16 (ref 12–28)
CALCIUM SERPL-MCNC: 9.5 MG/DL (ref 8.7–10.3)
CHLORIDE SERPL-SCNC: 104 MMOL/L (ref 96–106)
CO2 SERPL-SCNC: 23 MMOL/L (ref 20–29)
CREAT SERPL-MCNC: 1.08 MG/DL (ref 0.57–1)
EOSINOPHIL # BLD AUTO: 0 X10E3/UL (ref 0–0.4)
EOSINOPHIL NFR BLD AUTO: 0 %
ERYTHROCYTE [DISTWIDTH] IN BLOOD BY AUTOMATED COUNT: 14.6 % (ref 11.7–15.4)
EST. AVERAGE GLUCOSE BLD GHB EST-MCNC: 120 MG/DL
GLOBULIN SER CALC-MCNC: 2.6 G/DL (ref 1.5–4.5)
GLUCOSE SERPL-MCNC: 94 MG/DL (ref 65–99)
HBA1C MFR BLD: 5.8 % (ref 4.8–5.6)
HCT VFR BLD AUTO: 39.4 % (ref 34–46.6)
HGB BLD-MCNC: 12.8 G/DL (ref 11.1–15.9)
IMM GRANULOCYTES # BLD AUTO: 0 X10E3/UL (ref 0–0.1)
IMM GRANULOCYTES NFR BLD AUTO: 0 %
LYMPHOCYTES # BLD AUTO: 1.6 X10E3/UL (ref 0.7–3.1)
LYMPHOCYTES NFR BLD AUTO: 35 %
MCH RBC QN AUTO: 28.9 PG (ref 26.6–33)
MCHC RBC AUTO-ENTMCNC: 32.5 G/DL (ref 31.5–35.7)
MCV RBC AUTO: 89 FL (ref 79–97)
MONOCYTES # BLD AUTO: 0.6 X10E3/UL (ref 0.1–0.9)
MONOCYTES NFR BLD AUTO: 13 %
MORPHOLOGY BLD-IMP: ABNORMAL
NEUTROPHILS # BLD AUTO: 2.4 X10E3/UL (ref 1.4–7)
NEUTROPHILS NFR BLD AUTO: 52 %
PLATELET # BLD AUTO: 114 X10E3/UL (ref 150–450)
POTASSIUM SERPL-SCNC: 4.2 MMOL/L (ref 3.5–5.2)
PROT SERPL-MCNC: 6.9 G/DL (ref 6–8.5)
RBC # BLD AUTO: 4.43 X10E6/UL (ref 3.77–5.28)
SODIUM SERPL-SCNC: 143 MMOL/L (ref 134–144)
T4 FREE SERPL-MCNC: 0.93 NG/DL (ref 0.82–1.77)
TSH SERPL DL<=0.005 MIU/L-ACNC: 3.67 UIU/ML (ref 0.45–4.5)
WBC # BLD AUTO: 4.6 X10E3/UL (ref 3.4–10.8)

## 2020-01-14 RX ORDER — ATORVASTATIN CALCIUM 20 MG/1
TABLET, FILM COATED ORAL
Qty: 90 TAB | Refills: 1 | Status: SHIPPED | OUTPATIENT
Start: 2020-01-14 | End: 2020-08-25 | Stop reason: SDUPTHER

## 2020-01-19 DIAGNOSIS — F32.A DEPRESSION, UNSPECIFIED DEPRESSION TYPE: ICD-10-CM

## 2020-01-20 RX ORDER — SERTRALINE HYDROCHLORIDE 100 MG/1
TABLET, FILM COATED ORAL
Qty: 180 TAB | Refills: 1 | Status: SHIPPED | OUTPATIENT
Start: 2020-01-20 | End: 2020-07-20

## 2020-03-18 RX ORDER — LEVOTHYROXINE SODIUM 50 UG/1
TABLET ORAL
Qty: 90 TAB | Refills: 1 | Status: SHIPPED | OUTPATIENT
Start: 2020-03-18 | End: 2020-09-14

## 2020-03-19 ENCOUNTER — TELEPHONE (OUTPATIENT)
Dept: INTERNAL MEDICINE CLINIC | Age: 75
End: 2020-03-19

## 2020-07-05 NOTE — TELEPHONE ENCOUNTER
Pt is having oral surgery. Surgeon is looking for your guidance on whether or not pt can stop her blood thinner. If due to stroke hx you don't want her to stop it, he just needs something from you saying that, please. You were given a dose of Rocephin here. Start Oral antibiotic tomorrow. Follow-up with your urologist.  Return if any emergency or worsening symptoms.

## 2020-07-08 ENCOUNTER — OFFICE VISIT (OUTPATIENT)
Dept: NEUROLOGY | Age: 75
End: 2020-07-08

## 2020-07-08 VITALS
DIASTOLIC BLOOD PRESSURE: 68 MMHG | SYSTOLIC BLOOD PRESSURE: 110 MMHG | BODY MASS INDEX: 22.9 KG/M2 | OXYGEN SATURATION: 97 % | WEIGHT: 160 LBS | HEIGHT: 70 IN | HEART RATE: 74 BPM | TEMPERATURE: 97.8 F

## 2020-07-08 DIAGNOSIS — I69.398 SPASTICITY AS LATE EFFECT OF CEREBROVASCULAR ACCIDENT (CVA): ICD-10-CM

## 2020-07-08 DIAGNOSIS — R25.2 SPASTICITY AS LATE EFFECT OF CEREBROVASCULAR ACCIDENT (CVA): ICD-10-CM

## 2020-07-08 DIAGNOSIS — G40.209 PARTIAL SYMPTOMATIC EPILEPSY WITH COMPLEX PARTIAL SEIZURES, NOT INTRACTABLE, WITHOUT STATUS EPILEPTICUS (HCC): Primary | ICD-10-CM

## 2020-07-08 RX ORDER — DIVALPROEX SODIUM 500 MG/1
500 TABLET, DELAYED RELEASE ORAL 2 TIMES DAILY
Qty: 180 TAB | Refills: 3 | Status: SHIPPED | OUTPATIENT
Start: 2020-07-08 | End: 2021-07-27 | Stop reason: SDUPTHER

## 2020-07-08 NOTE — PROGRESS NOTES
Chief Complaint   Patient presents with    Neurologic Problem     follow up, still on Depakote       HPI          46-year-old woman here to follow-up. 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 She has post stroke symptomatic epilepsy. Since I saw her last she has been doing very well. Her  is present. No seizures. She continues to be very motivated to improve her language. She has now reading and writing at a 6 grade level. She enjoys reading. 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 She wants to contribute back to society particular from an educational perspective. She continues to see primary care and Occupational Therapy. History of psychiatric admissions for mood and behavioral concerns. She remains on Depakote. Mood is stable.       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     Family History   Problem Relation Age of Onset    Stroke Mother 67        Cerebral hemorrhage    Cancer Sister 52        Breast    Breast Cancer Sister 52    Diabetes Father      Social History     Socioeconomic History    Marital status:      Spouse name: Not on file    Number of children: Not on file    Years of education: Not on file    Highest education level: Not on file   Occupational History    Not on file   Social Needs    Financial resource strain: Not on file    Food insecurity     Worry: Not on file     Inability: Not on file    Transportation needs     Medical: Not on file     Non-medical: Not on file   Tobacco Use    Smoking status: Never Smoker    Smokeless tobacco: Never Used   Substance and Sexual Activity    Alcohol use: No     Alcohol/week: 0.0 standard drinks    Drug use: No     Types: OTC    Sexual activity: Not Currently     Partners: Male   Lifestyle    Physical activity     Days per week: Not on file     Minutes per session: Not on file    Stress: Not on file   Relationships    Social connections     Talks on phone: Not on file     Gets together: Not on file     Attends [employer] service: Not on file     Active member of club or organization: Not on file     Attends meetings of clubs or organizations: Not on file     Relationship status: Not on file    Intimate partner violence     Fear of current or ex partner: Not on file     Emotionally abused: Not on file     Physically abused: Not on file     Forced sexual activity: Not on file   Other Topics Concern    Not on file   Social History Narrative    Not on file     Allergies   Allergen Reactions    Rofecoxib Hives and Rash     Other reaction(s): Hives, Hives    Aleve [Naproxen Sodium] Nausea and Vomiting    Motrin [Ibuprofen] Nausea and Vomiting    Percocet [Oxycodone-Acetaminophen] Other (comments)     Other reaction(s): Hallucinations (finding)  hallucinations         Current Outpatient Medications   Medication Sig    divalproex DR (DEPAKOTE) 500 mg tablet Take 1 Tab by mouth two (2) times a day.  clopidogreL (PLAVIX) 75 mg tab TAKE 1 TABLET BY MOUTH EVERY DAY    levothyroxine (SYNTHROID) 50 mcg tablet TAKE 1 TAB BY MOUTH DAILY (BEFORE BREAKFAST).  sertraline (ZOLOFT) 100 mg tablet TAKE 2 TABLETS BY MOUTH DAILY    atorvastatin (LIPITOR) 20 mg tablet TAKE 1 TABLET BY MOUTH EVERY DAY    OTHER,NON-FORMULARY, Diagnosis: right upper extremity weakness,hand spasm,stiffness from previous stroke      Occupation therapy: please evaluate and treat.  mv-min/iron/folic/calcium/vitK (WOMEN'S MULTIVITAMIN PO) Take  by mouth.  fish oil-omega-3 fatty acids 340-1,000 mg capsule Take 1 Cap by mouth daily.     Calcium Carbonate-Vit D3-Min (CALTRATE 600+D PLUS MINERALS) 600 mg calcium- 400 unit tab Take 1 Tab by mouth daily. No current facility-administered medications for this visit. Neurologic Exam     Mental Status   WD/WN adult in NAD, normal grooming  VSS  A&O x 3    PERRL, nonicteric  Face is symmetric, tongue midline  Speech is nonfluent  No limb ataxia. No abnl movements. Right distal hand flaccid with increased tone, non-contracted  Moving all extemities spontaneously and symmetric  Normal gait    CVS RRR  Lungs nonlabored  Skin is warm and dry         Visit Vitals  /68 (BP 1 Location: Left arm, BP Patient Position: Sitting)   Pulse 74   Temp 97.8 °F (36.6 °C) (Oral)   Ht 5' 9.5\" (1.765 m)   Wt 72.6 kg (160 lb)   SpO2 97%   BMI 23.29 kg/m²       Assessment and Plan   Diagnoses and all orders for this visit:    1. Partial symptomatic epilepsy with complex partial seizures, not intractable, without status epilepticus ([facility]    2. Spasticity as late effect of cerebrovascular accident (CVA)    Other orders  -     divalproex DR (DEPAKOTE) 500 mg tablet; Take 1 Tab by mouth two (2) times a day. 71-year-old woman who has right hemiparesis and aphasia secondary to stroke with subsequent epilepsy. Overall she is doing very well clinically. No breakthrough seizures. She is very motivated to improve her language. She is seeing OT very soon this month for follow-up. I am not making any medication changes. Continue Plavix and Depakote. I would like to see her annually or sooner if something changes acutely. 20 minutes of time was spent face-to-face with her and her  discussing her condition and progress thus far. I reviewed and decided to continue the current medications. This clinical note was dictated with an electronic dictation software that can make unintentional errors. If there are any questions, please contact me directly for clarification.       [address] [facility], [address]  Diplomate PANCHO

## 2020-07-08 NOTE — PROGRESS NOTES
Chief Complaint   Patient presents with    Neurologic Problem     follow up, still on Depakote     Visit Vitals  /68 (BP 1 Location: Left arm, BP Patient Position: Sitting)   Pulse 74   Temp 97.8 °F (36.6 °C) (Oral)   Ht 5' 9.5\" (1.765 m)   Wt 72.6 kg (160 lb)   SpO2 97%   BMI 23.29 kg/m²

## 2020-08-03 ENCOUNTER — HOSPITAL ENCOUNTER (OUTPATIENT)
Dept: MAMMOGRAPHY | Age: 75
Discharge: HOME | End: 2020-08-03
Attending: INTERNAL MEDICINE
Payer: MEDICARE

## 2020-08-03 DIAGNOSIS — Z12.31 VISIT FOR SCREENING MAMMOGRAM: ICD-10-CM

## 2020-08-03 PROCEDURE — 77067 SCR MAMMO BI INCL CAD: CPT

## 2020-08-26 RX ORDER — ATORVASTATIN CALCIUM 20 MG/1
TABLET, FILM COATED ORAL
Qty: 90 TAB | Refills: 0 | Status: SHIPPED | OUTPATIENT
Start: 2020-08-26 | End: 2020-11-24 | Stop reason: SDUPTHER

## 2020-11-24 NOTE — TELEPHONE ENCOUNTER
Last OV 1/13/2020  Future Appointments   Date Time Provider Dave Jaramillo   11/30/2020 11:20 AM Romero Mathew MD [facility] YOLIE CASTREJON AMB

## 2020-11-24 NOTE — TELEPHONE ENCOUNTER
Requested Prescriptions     Pending Prescriptions Disp Refills    atorvastatin (LIPITOR) 20 mg tablet 90 Tab 0     Sig: TAKE 1 TABLET BY MOUTH EVERY DAY     01/13/20 11/30/20    [facility]/pharmacy #[id number]- [place], [place] - [facility] [address] Valerio

## 2020-11-27 RX ORDER — ATORVASTATIN CALCIUM 20 MG/1
TABLET, FILM COATED ORAL
Qty: 90 TAB | Refills: 0 | Status: SHIPPED | OUTPATIENT
Start: 2020-11-27 | End: 2021-02-18

## 2020-11-27 NOTE — PROGRESS NOTES
Subjective:      Krista Leventhal is a 70 y.o. female who presents today for follow up of her hypothyroid, depression, .hyperlipidemia and elevated glucose. She is here with her     She has contracture of her right hand due to her hx of strokes. Working with OT at [facility]. Has had significant improvement. Completed PT in early November. She is still doing exercises on her own. She has been able to fully extend all of her fingers. Her neurologist is Dr. Barry Whyte for her seizure disorder and hx of strokes. She has right hemiparesis and expressive aphagia. She is using zoloft for her depression with anxiety. No longer following with her psychiatrists, Dr. Briseida Canseco, at [facility] as he has left [facility].  states that her mood is well balanced at this time.        No significant changes with her dysarthria    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    S/P arthroscopy of shoulder Z98.890    Colon cancer screening Z12.11    History of screening mammography Z92.89    Pap smear for cervical cancer screening Z12.4    S/P left cataract extraction Z98.42    Vitamin D deficiency E55.9    Cerebral infarction ([address] I63.9    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    Pseudobulbar affect F48.2    Right arm weakness R29.898    Seizure ([address] R56.9    Toxoplasma chorioretinitis, 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 NEED OFFICE VISIT FOR FURTHER REFILLS 180 Tab 0    levothyroxine (SYNTHROID) 50 mcg tablet Take 1 Tab by mouth Daily (before breakfast). Need office visit for further refills 90 Tab 0    divalproex DR (DEPAKOTE) 500 mg tablet Take 1 Tab by mouth two (2) times a day. 180 Tab 3    clopidogreL (PLAVIX) 75 mg tab TAKE 1 TABLET BY MOUTH EVERY DAY 90 Tab 1    OTHER,NON-FORMULARY, Diagnosis: right upper extremity weakness,hand spasm,stiffness from previous stroke      Occupation therapy: please evaluate and treat. 1 Each 0    mv-min/iron/folic/calcium/vitK (WOMEN'S MULTIVITAMIN PO) Take  by mouth.  fish oil-omega-3 fatty acids 340-1,000 mg capsule Take 1 Cap by mouth daily.  Calcium Carbonate-Vit D3-Min (CALTRATE 600+D PLUS MINERALS) 600 mg calcium- 400 unit tab Take 1 Tab by mouth daily. Review of Systems    Pertinent items are noted in HPI. Objective: Wt Readings from Last 3 Encounters:   07/08/20 160 lb (72.6 kg)   01/13/20 161 lb (73 kg)   07/12/19 158 lb (71.7 kg)     BP Readings from Last 3 Encounters:   07/08/20 110/68   01/13/20 100/60   07/12/19 116/60     Visit Vitals  BP 97/69   Pulse 76   Temp 97.1 °F (36.2 °C) (Temporal)   Resp 16   Ht 5' 9.5\" (1.765 m)   Wt 162 lb 3.2 oz (73.6 kg)   SpO2 96%   BMI 23.61 kg/m²     General appearance: alert, cooperative, no distress, appears stated age, understands speech, difficulty with expressing her thoughts and verbalizing her words. Head: Normocephalic, without obvious abnormality, atraumatic  Neck: supple, symmetrical, trachea midline, no adenopathy, no carotid bruit and no JVD  Lungs: clear to auscultation bilaterally  Heart: regular rate and rhythm, S1, S2 normal, no murmur, click, rub or gallop  Extremities: no edema. Right hand - has brace on digits 2-5. Able to straighten fingers and palm. Pulses: 2+ and symmetric    Assessment/Plan:     1. Hypothyroidism, adult  -clinically euthyroid.  -compliant with use of levothyroxine. - T4, FREE; Future  - TSH 3RD GENERATION; Future    2. Mild depression (HCC)  -stable on current dose of zoloft. No adjustments at this time. 3. Mixed hyperlipidemia  -due for lipid profile. Patient is not fasting.     - LIPID PANEL; Future  - METABOLIC PANEL, COMPREHENSIVE; Future  - CBC WITH AUTOMATED DIFF; Future    4. Elevated glucose  -continue low sugar and carbohydrate diet.     - HEMOGLOBIN A1C WITH EAG; Future  - METABOLIC PANEL, COMPREHENSIVE; Future    5. Encounter for long-term (current) use of medications      6. CVA, old, dysarthria  -stable. Has worked with speech therapy. Orders Placed This Encounter    HEMOGLOBIN A1C WITH EAG     Standing Status:   Future     Number of Occurrences:   1     Standing Expiration Date:   11/30/2021    T4, FREE     Standing Status:   Future     Number of Occurrences:   1     Standing Expiration Date:   11/30/2021    TSH 3RD GENERATION     Standing Status:   Future     Number of Occurrences:   1     Standing Expiration Date:   11/30/2021    LIPID PANEL     Standing Status:   Future     Number of Occurrences:   1     Standing Expiration Date:   46/80/2750    METABOLIC PANEL, COMPREHENSIVE     Standing Status:   Future     Number of Occurrences:   1     Standing Expiration Date:   11/30/2021    CBC WITH AUTOMATED DIFF     Standing Status:   Future     Number of Occurrences:   1     Standing Expiration Date:   11/30/2021         Follow-up Disposition:     Follow up in 6 months     Return if symptoms worsen or fail to improve. Advised patient to call back or return to office if symptoms worsen/change/persist.     Discussed expected course/resolution/complications of diagnosis in detail with patient. Medication risks/benefits/costs/interactions/alternatives discussed with patient. Patient was given an after visit summary which includes diagnoses, current medications, & vitals. Patient expressed understanding with the diagnosis and plan.

## 2020-11-30 ENCOUNTER — OFFICE VISIT (OUTPATIENT)
Dept: INTERNAL MEDICINE CLINIC | Age: 75
End: 2020-11-30
Payer: MEDICARE

## 2020-11-30 VITALS
DIASTOLIC BLOOD PRESSURE: 69 MMHG | SYSTOLIC BLOOD PRESSURE: 97 MMHG | RESPIRATION RATE: 16 BRPM | HEIGHT: 70 IN | TEMPERATURE: 97.1 F | WEIGHT: 162.2 LBS | HEART RATE: 76 BPM | OXYGEN SATURATION: 96 % | BODY MASS INDEX: 23.22 KG/M2

## 2020-11-30 DIAGNOSIS — E78.2 MIXED HYPERLIPIDEMIA: ICD-10-CM

## 2020-11-30 DIAGNOSIS — R73.09 ELEVATED GLUCOSE: ICD-10-CM

## 2020-11-30 DIAGNOSIS — I69.322 CVA, OLD, DYSARTHRIA: ICD-10-CM

## 2020-11-30 DIAGNOSIS — E03.9 HYPOTHYROIDISM, ADULT: Primary | ICD-10-CM

## 2020-11-30 DIAGNOSIS — F32.A MILD DEPRESSION: ICD-10-CM

## 2020-11-30 DIAGNOSIS — Z79.899 ENCOUNTER FOR LONG-TERM (CURRENT) USE OF MEDICATIONS: ICD-10-CM

## 2020-11-30 DIAGNOSIS — E03.9 HYPOTHYROIDISM, ADULT: ICD-10-CM

## 2020-11-30 PROCEDURE — G9899 SCRN MAM PERF RSLTS DOC: HCPCS | Performed by: INTERNAL MEDICINE

## 2020-11-30 PROCEDURE — G8420 CALC BMI NORM PARAMETERS: HCPCS | Performed by: INTERNAL MEDICINE

## 2020-11-30 PROCEDURE — 1101F PT FALLS ASSESS-DOCD LE1/YR: CPT | Performed by: INTERNAL MEDICINE

## 2020-11-30 PROCEDURE — G8536 NO DOC ELDER MAL SCRN: HCPCS | Performed by: INTERNAL MEDICINE

## 2020-11-30 PROCEDURE — G8400 PT W/DXA NO RESULTS DOC: HCPCS | Performed by: INTERNAL MEDICINE

## 2020-11-30 PROCEDURE — 99214 OFFICE O/P EST MOD 30 MIN: CPT | Performed by: INTERNAL MEDICINE

## 2020-11-30 PROCEDURE — G8427 DOCREV CUR MEDS BY ELIG CLIN: HCPCS | Performed by: INTERNAL MEDICINE

## 2020-11-30 PROCEDURE — 1090F PRES/ABSN URINE INCON ASSESS: CPT | Performed by: INTERNAL MEDICINE

## 2020-11-30 PROCEDURE — G9717 DOC PT DX DEP/BP F/U NT REQ: HCPCS | Performed by: INTERNAL MEDICINE

## 2020-11-30 PROCEDURE — G0463 HOSPITAL OUTPT CLINIC VISIT: HCPCS | Performed by: INTERNAL MEDICINE

## 2020-11-30 PROCEDURE — 3017F COLORECTAL CA SCREEN DOC REV: CPT | Performed by: INTERNAL MEDICINE

## 2020-12-09 ENCOUNTER — DOCUMENTATION ONLY (OUTPATIENT)
Dept: INTERNAL MEDICINE CLINIC | Age: 75
End: 2020-12-09

## 2020-12-09 NOTE — PROGRESS NOTES
[employer] Customer Medical Report scanned into patient's chart and mailed to [facility] at [id number], [place], [address].

## 2020-12-23 ENCOUNTER — TELEPHONE (OUTPATIENT)
Dept: INTERNAL MEDICINE CLINIC | Age: 75
End: 2020-12-23

## 2020-12-23 RX ORDER — LEVOTHYROXINE SODIUM 50 UG/1
50 TABLET ORAL
Qty: 90 TAB | Refills: 1 | Status: SHIPPED | OUTPATIENT
Start: 2020-12-23 | End: 2021-06-13

## 2020-12-23 NOTE — TELEPHONE ENCOUNTER
[phone]    The patient will be out the Levothyroxine tomorrow.  The  would like the meds filled today

## 2020-12-23 NOTE — TELEPHONE ENCOUNTER
Requested Prescriptions     Pending Prescriptions Disp Refills    levothyroxine (SYNTHROID) 50 mcg tablet 90 Tab 0     Sig: Take 1 Tab by mouth Daily (before breakfast).  Need office visit for further refills           [facility]/pharmacy #[id number]- [place], [address]

## 2020-12-31 RX ORDER — CLOPIDOGREL BISULFATE 75 MG/1
TABLET ORAL
Qty: 90 TAB | Refills: 1 | Status: SHIPPED | OUTPATIENT
Start: 2020-12-31 | End: 2021-06-27

## 2021-01-11 ENCOUNTER — TELEPHONE (OUTPATIENT)
Dept: INTERNAL MEDICINE CLINIC | Age: 76
End: 2021-01-11

## 2021-01-11 DIAGNOSIS — I69.322 CVA, OLD, DYSARTHRIA: Primary | ICD-10-CM

## 2021-01-11 NOTE — TELEPHONE ENCOUNTER
Patient's  left voicemail on my extension regarding speech therapy order. He is requesting this be sent to [address] [place] [address] for therapy due to past stroke. No fax number or phone number left for [facility] on this message. Forwarding to PCP as this is an order, not an insurance referral needed.

## 2021-01-20 ENCOUNTER — TELEPHONE (OUTPATIENT)
Dept: INTERNAL MEDICINE CLINIC | Age: 76
End: 2021-01-20

## 2021-01-20 DIAGNOSIS — F32.A DEPRESSION, UNSPECIFIED DEPRESSION TYPE: ICD-10-CM

## 2021-01-20 RX ORDER — SERTRALINE HYDROCHLORIDE 100 MG/1
200 TABLET, FILM COATED ORAL DAILY
Qty: 180 TAB | Refills: 1 | Status: SHIPPED | OUTPATIENT
Start: 2021-01-20 | End: 2021-07-19

## 2021-02-08 ENCOUNTER — TELEPHONE (OUTPATIENT)
Dept: INTERNAL MEDICINE CLINIC | Age: 76
End: 2021-02-08

## 2021-02-08 DIAGNOSIS — I69.322 CVA, OLD, DYSARTHRIA: Primary | ICD-10-CM

## 2021-02-08 DIAGNOSIS — M24.541 CONTRACTURE, RIGHT HAND: ICD-10-CM

## 2021-02-08 NOTE — TELEPHONE ENCOUNTER
Patients  requests a call - Needs a referral done for the patient for OT at [address] [place] [address].       Wong Carr () [phone] (H)

## 2021-02-10 ENCOUNTER — TELEPHONE (OUTPATIENT)
Dept: INTERNAL MEDICINE CLINIC | Age: 76
End: 2021-02-10

## 2021-02-10 NOTE — TELEPHONE ENCOUNTER
Patient's  would like to hear about the results of his wife's latest lab tests. The tests were done downstairs at the [address] [place] [address] lab and I was unable to print them out. He would like a callback from the nurse or Dr. Silvia Cross to make sure everything is ok.

## 2021-02-10 NOTE — TELEPHONE ENCOUNTER
2/10/2021. Spoke with . Labs were all in normal limits. No medication adjustments were needed. Labs were not put into computer by Kadie Palmer.  We had to request printed copy. It has been put into scan several weeks ago, but has not been scanned into chart yet. I did mail them a copy of the labs a couple weeks ago. He has not received. Them.

## 2021-03-18 ENCOUNTER — IMMUNIZATION (OUTPATIENT)
Dept: INTERNAL MEDICINE CLINIC | Age: 76
End: 2021-03-18
Payer: MEDICARE

## 2021-03-18 DIAGNOSIS — Z23 ENCOUNTER FOR IMMUNIZATION: Primary | ICD-10-CM

## 2021-03-18 PROCEDURE — 91300 COVID-19, MRNA, LNP-S, PF, 30MCG/0.3ML DOSE(PFIZER): CPT | Performed by: FAMILY MEDICINE

## 2021-03-18 PROCEDURE — 0001A COVID-19, MRNA, LNP-S, PF, 30MCG/0.3ML DOSE(PFIZER): CPT | Performed by: FAMILY MEDICINE

## 2021-04-08 ENCOUNTER — IMMUNIZATION (OUTPATIENT)
Dept: INTERNAL MEDICINE CLINIC | Age: 76
End: 2021-04-08
Payer: MEDICARE

## 2021-04-08 DIAGNOSIS — Z23 ENCOUNTER FOR IMMUNIZATION: Primary | ICD-10-CM

## 2021-04-08 PROCEDURE — 0002A COVID-19, MRNA, LNP-S, PF, 30MCG/0.3ML DOSE(PFIZER): CPT | Performed by: FAMILY MEDICINE

## 2021-04-08 PROCEDURE — 91300 COVID-19, MRNA, LNP-S, PF, 30MCG/0.3ML DOSE(PFIZER): CPT | Performed by: FAMILY MEDICINE

## 2021-05-24 ENCOUNTER — OFFICE VISIT (OUTPATIENT)
Dept: INTERNAL MEDICINE CLINIC | Age: 76
End: 2021-05-24
Payer: MEDICARE

## 2021-05-24 VITALS
BODY MASS INDEX: 22.62 KG/M2 | HEIGHT: 70 IN | HEART RATE: 70 BPM | SYSTOLIC BLOOD PRESSURE: 107 MMHG | DIASTOLIC BLOOD PRESSURE: 66 MMHG | OXYGEN SATURATION: 96 % | WEIGHT: 158 LBS | RESPIRATION RATE: 16 BRPM | TEMPERATURE: 98 F

## 2021-05-24 DIAGNOSIS — Z00.00 MEDICARE ANNUAL WELLNESS VISIT, SUBSEQUENT: Primary | ICD-10-CM

## 2021-05-24 DIAGNOSIS — R73.09 ELEVATED GLUCOSE: ICD-10-CM

## 2021-05-24 DIAGNOSIS — E03.9 HYPOTHYROIDISM, ADULT: ICD-10-CM

## 2021-05-24 DIAGNOSIS — Z79.899 ENCOUNTER FOR LONG-TERM (CURRENT) USE OF MEDICATIONS: ICD-10-CM

## 2021-05-24 DIAGNOSIS — Z13.31 SCREENING FOR DEPRESSION: ICD-10-CM

## 2021-05-24 DIAGNOSIS — I69.322 CVA, OLD, DYSARTHRIA: ICD-10-CM

## 2021-05-24 DIAGNOSIS — E78.2 MIXED HYPERLIPIDEMIA: ICD-10-CM

## 2021-05-24 DIAGNOSIS — F32.A DEPRESSION, UNSPECIFIED DEPRESSION TYPE: ICD-10-CM

## 2021-05-24 DIAGNOSIS — M24.541 CONTRACTURE, RIGHT HAND: ICD-10-CM

## 2021-05-24 LAB
ALBUMIN SERPL-MCNC: 3.8 G/DL (ref 3.5–5)
ALBUMIN/GLOB SERPL: 1.1 {RATIO} (ref 1.1–2.2)
ALP SERPL-CCNC: 66 U/L (ref 45–117)
ALT SERPL-CCNC: 17 U/L (ref 12–78)
ANION GAP SERPL CALC-SCNC: 7 MMOL/L (ref 5–15)
AST SERPL-CCNC: 11 U/L (ref 15–37)
BASOPHILS # BLD: 0 K/UL (ref 0–0.1)
BASOPHILS NFR BLD: 0 % (ref 0–1)
BILIRUB SERPL-MCNC: 0.3 MG/DL (ref 0.2–1)
BUN SERPL-MCNC: 17 MG/DL (ref 6–20)
BUN/CREAT SERPL: 15 (ref 12–20)
CALCIUM SERPL-MCNC: 9.4 MG/DL (ref 8.5–10.1)
CHLORIDE SERPL-SCNC: 110 MMOL/L (ref 97–108)
CO2 SERPL-SCNC: 27 MMOL/L (ref 21–32)
CREAT SERPL-MCNC: 1.12 MG/DL (ref 0.55–1.02)
DIFFERENTIAL METHOD BLD: ABNORMAL
EOSINOPHIL # BLD: 0 K/UL (ref 0–0.4)
EOSINOPHIL NFR BLD: 0 % (ref 0–7)
ERYTHROCYTE [DISTWIDTH] IN BLOOD BY AUTOMATED COUNT: 14.4 % (ref 11.5–14.5)
EST. AVERAGE GLUCOSE BLD GHB EST-MCNC: 120 MG/DL
GLOBULIN SER CALC-MCNC: 3.4 G/DL (ref 2–4)
GLUCOSE SERPL-MCNC: 86 MG/DL (ref 65–100)
HBA1C MFR BLD: 5.8 % (ref 4–5.6)
HCT VFR BLD AUTO: 37.6 % (ref 35–47)
HGB BLD-MCNC: 12.5 G/DL (ref 11.5–16)
IMM GRANULOCYTES # BLD AUTO: 0 K/UL (ref 0–0.04)
IMM GRANULOCYTES NFR BLD AUTO: 0 % (ref 0–0.5)
LYMPHOCYTES # BLD: 1.8 K/UL (ref 0.8–3.5)
LYMPHOCYTES NFR BLD: 37 % (ref 12–49)
MCH RBC QN AUTO: 29.5 PG (ref 26–34)
MCHC RBC AUTO-ENTMCNC: 33.2 G/DL (ref 30–36.5)
MCV RBC AUTO: 88.7 FL (ref 80–99)
MONOCYTES # BLD: 0.5 K/UL (ref 0–1)
MONOCYTES NFR BLD: 10 % (ref 5–13)
NEUTS SEG # BLD: 2.5 K/UL (ref 1.8–8)
NEUTS SEG NFR BLD: 52 % (ref 32–75)
NRBC # BLD: 0 K/UL (ref 0–0.01)
NRBC BLD-RTO: 0 PER 100 WBC
PLATELET # BLD AUTO: 111 K/UL (ref 150–400)
POTASSIUM SERPL-SCNC: 4.7 MMOL/L (ref 3.5–5.1)
PROT SERPL-MCNC: 7.2 G/DL (ref 6.4–8.2)
RBC # BLD AUTO: 4.24 M/UL (ref 3.8–5.2)
SODIUM SERPL-SCNC: 144 MMOL/L (ref 136–145)
T4 FREE SERPL-MCNC: 0.8 NG/DL (ref 0.8–1.5)
TSH SERPL DL<=0.05 MIU/L-ACNC: 2.7 UIU/ML (ref 0.36–3.74)
WBC # BLD AUTO: 4.9 K/UL (ref 3.6–11)

## 2021-05-24 PROCEDURE — G0463 HOSPITAL OUTPT CLINIC VISIT: HCPCS | Performed by: INTERNAL MEDICINE

## 2021-05-24 PROCEDURE — G9899 SCRN MAM PERF RSLTS DOC: HCPCS | Performed by: INTERNAL MEDICINE

## 2021-05-24 PROCEDURE — 99213 OFFICE O/P EST LOW 20 MIN: CPT | Performed by: INTERNAL MEDICINE

## 2021-05-24 PROCEDURE — G8420 CALC BMI NORM PARAMETERS: HCPCS | Performed by: INTERNAL MEDICINE

## 2021-05-24 PROCEDURE — G0439 PPPS, SUBSEQ VISIT: HCPCS | Performed by: INTERNAL MEDICINE

## 2021-05-24 PROCEDURE — G8400 PT W/DXA NO RESULTS DOC: HCPCS | Performed by: INTERNAL MEDICINE

## 2021-05-24 PROCEDURE — 3017F COLORECTAL CA SCREEN DOC REV: CPT | Performed by: INTERNAL MEDICINE

## 2021-05-24 PROCEDURE — G8427 DOCREV CUR MEDS BY ELIG CLIN: HCPCS | Performed by: INTERNAL MEDICINE

## 2021-05-24 PROCEDURE — 1090F PRES/ABSN URINE INCON ASSESS: CPT | Performed by: INTERNAL MEDICINE

## 2021-05-24 PROCEDURE — 1101F PT FALLS ASSESS-DOCD LE1/YR: CPT | Performed by: INTERNAL MEDICINE

## 2021-05-24 PROCEDURE — G8536 NO DOC ELDER MAL SCRN: HCPCS | Performed by: INTERNAL MEDICINE

## 2021-05-24 PROCEDURE — G9717 DOC PT DX DEP/BP F/U NT REQ: HCPCS | Performed by: INTERNAL MEDICINE

## 2021-05-24 NOTE — PATIENT INSTRUCTIONS
Medicare Wellness Visit, Female The best way to live healthy is to have a lifestyle where you eat a well-balanced diet, exercise regularly, limit alcohol use, and quit all forms of tobacco/nicotine, if applicable. Regular preventive services are another way to keep healthy. Preventive services (vaccines, screening tests, monitoring & exams) can help personalize your care plan, which helps you manage your own care. Screening tests can find health problems at the earliest stages, when they are easiest to treat. [facility] follows the current, evidence-based guidelines published by the [facility] ([facility]) when recommending preventive services for our patients. Because we follow these guidelines, sometimes recommendations change over time as research supports it. (For example, mammograms used to be recommended annually. Even though Medicare will still pay for an annual mammogram, the newer guidelines recommend a mammogram every two years for women of average risk). Of course, you and your doctor may decide to screen more often for some diseases, based on your risk and your co-morbidities (chronic disease you are already diagnosed with). Preventive services for you include: - Medicare offers their members a free annual wellness visit, which is time for you and your primary care provider to discuss and plan for your preventive service needs. Take advantage of this benefit every year! 
-All adults over the age of 72 should receive the recommended pneumonia vaccines. Current USPSTF guidelines recommend a series of two vaccines for the best pneumonia protection.  
-All adults should have a flu vaccine yearly and a tetanus vaccine every 10 years.  
-All adults age 48 and older should receive the shingles vaccines (series of two vaccines).      
-All adults age 38-68 who are overweight should have a diabetes screening test once every three years.  
-All adults born between [birth year] and 1963 should be screened once for Hepatitis C. 
-Other screening tests and preventive services for persons with diabetes include: an eye exam to screen for diabetic retinopathy, a kidney function test, a foot exam, and stricter control over your cholesterol.  
-Cardiovascular screening for adults with routine risk involves an electrocardiogram (ECG) at intervals determined by your doctor.  
-Colorectal cancer screenings should be done for adults age 54-65 with no increased risk factors for colorectal cancer. There are a number of acceptable methods of screening for this type of cancer. Each test has its own benefits and drawbacks. Discuss with your doctor what is most appropriate for you during your annual wellness visit. The different tests include: colonoscopy (considered the best screening method), a fecal occult blood test, a fecal DNA test, and sigmoidoscopy. 
 
-A bone mass density test is recommended when a woman turns 65 to screen for osteoporosis. This test is only recommended one time, as a screening. Some providers will use this same test as a disease monitoring tool if you already have osteoporosis. -Breast cancer screenings are recommended every other year for women of normal risk, age 54-69. 
-Cervical cancer screenings for women over age 72 are only recommended with certain risk factors.

## 2021-05-24 NOTE — PROGRESS NOTES
Assessment/Plan:     1. Depression, unspecified depression type  -very well controlled. -will continue sertraline 100mg daily    2. Hypothyroidism, adult  -appears clinically euthyroid  -compliant with use of her levothyroxine.  assisting with administering he medications.     - TSH 3RD GENERATION; Future  - T4, FREE; Future    3. Elevated glucose  -maintaining low sugar diet. - METABOLIC PANEL, COMPREHENSIVE; Future  - HEMOGLOBIN A1C WITH EAG; Future    4. Mixed hyperlipidemia  -taking atorvastatin 20mg daily. Last fasting lipid panel done 1/2021 controlled. - CBC WITH AUTOMATED DIFF; Future  - METABOLIC PANEL, COMPREHENSIVE; Future    5. CVA, old, dysarthria  -has intermittently been working with speech therapy. Very motivated to use apps and also workbooks to help her speech  -anticoagulated with plavix. 6. Contracture, right hand  -has been working with OT. Has a new brace. Able to extend fingers a bit more. -need to continue to work with PT for continued care of right hand contracture. 7. Encounter for long-term (current) use of medications      8. Medicare annual wellness visit, subsequent  -completed today.   -[facility] decision maker reviewed with patient and updated. - DEPRESSION SCREEN ANNUAL    9.  Screening for depression    - DEPRESSION SCREEN ANNUAL     Orders Placed This Encounter    ANNUAL DEPRESSION SCREEN 8-15 MIN    CBC WITH AUTOMATED DIFF     Standing Status:   Future     Number of Occurrences:   1     Standing Expiration Date:   3/79/5096    METABOLIC PANEL, COMPREHENSIVE     Standing Status:   Future     Number of Occurrences:   1     Standing Expiration Date:   5/24/2022    TSH 3RD GENERATION     Standing Status:   Future     Number of Occurrences:   1     Standing Expiration Date:   5/24/2022    T4, FREE     Standing Status:   Future     Number of Occurrences:   1     Standing Expiration Date:   5/24/2022    HEMOGLOBIN A1C WITH EAG     Standing Status: Future     Number of Occurrences:   1     Standing Expiration Date:   5/24/2022        Follow-up Disposition:     Follow up in 6 months       Subjective:      Willy Thompson is a 70 y.o. female who presents today for hypothyroid, depression, .hyperlipidemia and elevated glucose. Since last visit:  -has been working with OT and PT for the contracture of her right hand due to CVA. She has a new brace. She is able to extend her fingers more. She is very happy with her progress.  -she is also working very hard in 'getting my brain back'. Doing lessons with word recognition and matching with tools like her IPAD. -she is eating well. Maintaining low fat diet. Due for:  -Medicare Wellness Exam     Current Outpatient Medications   Medication Sig Dispense Refill    atorvastatin (LIPITOR) 20 mg tablet TAKE 1 TABLET BY MOUTH EVERY DAY 90 Tab 1    sertraline (ZOLOFT) 100 mg tablet Take 2 Tabs by mouth daily. 180 Tab 1    clopidogreL (PLAVIX) 75 mg tab TAKE 1 TABLET BY MOUTH EVERY DAY 90 Tab 1    levothyroxine (SYNTHROID) 50 mcg tablet Take 1 Tab by mouth Daily (before breakfast). 90 Tab 1    divalproex DR (DEPAKOTE) 500 mg tablet Take 1 Tab by mouth two (2) times a day. 180 Tab 3    OTHER,NON-FORMULARY, Diagnosis: right upper extremity weakness,hand spasm,stiffness from previous stroke      Occupation therapy: please evaluate and treat. 1 Each 0    mv-min/iron/folic/calcium/vitK (WOMEN'S MULTIVITAMIN PO) Take  by mouth.  Calcium Carbonate-Vit D3-Min (CALTRATE 600+D PLUS MINERALS) 600 mg calcium- 400 unit tab Take 1 Tab by mouth daily.  fish oil-omega-3 fatty acids 340-1,000 mg capsule Take 1 Cap by mouth daily. (Patient not taking: Reported on 5/24/2021)          Review of Systems    Pertinent items are noted in HPI. Objective:      Wt Readings from Last 3 Encounters:   05/24/21 158 lb (71.7 kg)   11/30/20 162 lb 3.2 oz (73.6 kg)   07/08/20 160 lb (72.6 kg)     BP Readings from Last 3 Encounters:   05/24/21 107/66   11/30/20 97/69   07/08/20 110/68     Visit Vitals  /66   Pulse 70   Temp 98 °F (36.7 °C) (Temporal)   Resp 16   Ht 5' 9.5\" (1.765 m)   Wt 158 lb (71.7 kg)   SpO2 96%   BMI 23.00 kg/m²     General appearance: alert, cooperative, no distress, appears stated age, dysarthric  Head: Normocephalic, without obvious abnormality, atraumatic  Neck: supple, symmetrical, trachea midline, no adenopathy, thyroid: not enlarged, symmetric, no tenderness/mass/nodules, no carotid bruit and no JVD  Lungs: clear to auscultation bilaterally  Heart: regular rate and rhythm, S1, S2 normal, no murmur, click, rub or gallop  Abdomen: soft, non-tender. Bowel sounds normal. No masses,  no organomegaly  Extremities: extremities normal, atraumatic, no cyanosis or edema  Pulses: 2+ and symmetric              Disclaimer:  Return if symptoms worsen or fail to improve. Advised patient to call back or return to office if symptoms worsen/change/persist.     Discussed expected course/resolution/complications of diagnosis in detail with patient. Medication risks/benefits/costs/interactions/alternatives discussed with patient. Patient was given an after visit summary which includes diagnoses, current medications, & vitals. Patient expressed understanding with the diagnosis and plan. This is the Subsequent Medicare Annual Wellness Exam, performed 12 months or more after the Initial AWV or the last Subsequent AWV    I have reviewed the patient's medical history in detail and updated the computerized patient record. Assessment/Plan   Education and counseling provided:  Are appropriate based on today's review and evaluation    1. Medicare annual wellness visit, subsequent  -     [address]  2. Depression, unspecified depression type  3. Hypothyroidism, adult  4. Elevated glucose  5. Mixed hyperlipidemia  6. CVA, old, dysarthria  7. Contracture, right hand  8.  Encounter for long-term (current) use of medications  9. Screening for depression  -     DEPRESSION SCREEN ANNUAL       Depression Risk Factor Screening     3 most recent PHQ Screens 5/24/2021   Little interest or pleasure in doing things Not at all   Feeling down, depressed, irritable, or hopeless Not at all   Total Score PHQ 2 0   Trouble falling or staying asleep, or sleeping too much -   Feeling tired or having little energy -   Poor appetite, weight loss, or overeating -   Feeling bad about yourself - or that you are a failure or have let yourself or your family down -   Trouble concentrating on things such as school, work, reading, or watching TV -   Moving or speaking so slowly that other people could have noticed; or the opposite being so fidgety that others notice -   Thoughts of being better off dead, or hurting yourself in some way -       Alcohol Risk Screen    Do you average more than 1 drink per night or more than 7 drinks a week:  No    On any one occasion in the past three months have you have had more than 3 drinks containing alcohol:  No        Functional Ability and Level of Safety    Hearing: Hearing is good. Activities of Daily Living: The home contains: no safety equipment. Patient needs help with:  transportation, shopping, managing medications and managing money      Ambulation: with no difficulty     Fall Risk:  Fall Risk Assessment, last 12 mths 7/8/2020   Able to walk? Yes   Fall in past 12 months?  No      Abuse Screen:  Patient is not abused       Cognitive Screening    Has your family/caregiver stated any concerns about your memory: no         Health Maintenance Due     Health Maintenance Due   Topic Date Due    Colorectal Cancer Screening Combo  01/01/2019    Shingrix Vaccine Age 50> (2 of 2) 12/06/2019       Patient Care Team   Patient Care Team:  Conor Mehta MD as PCP - General (Internal Medicine)  Conor Mehta MD as PCP - Yolanda Juarezled Provider  Gladys Lind MD (Ophthalmology)  Vimal Allison Tracey Guerin, DO (Neurology)    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    S/P arthroscopy of shoulder Z98.890    Colon cancer screening Z12.11    History of screening mammography Z92.89    Pap smear for cervical cancer screening Z12.4    S/P left cataract extraction Z98.42    Vitamin D deficiency E55.9    Cerebral infarction ([address] I63.9    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    Pseudobulbar affect F48.2    Right arm weakness R29.898    Seizure ([address] R56.9    Toxoplasma chorioretinitis, 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    TIA (transient ischemic attack) G45.9    Depression F32.9    Cerebrovascular accident (CVA) due to thrombosis ([address] I63.9    Sudden severe confusion due to brain dysfunction caused by illness G93.89, F05    Mild depression ([address] F32.0     Past Medical History:   Diagnosis Date    Anxiety     Depression     Hypovitaminosis D 02/12    Menopause 2002    Seizures ([address]     Stroke ([address] 2014    Stroke ([address] 2017    Thromboembolus ([address] 1975     RLE    Thyroid disease     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 shoulders after fall    HX TUBAL LIGATION  1975    VASCULAR SURGERY PROCEDURE UNLIST  1975    varicose vein stripping Right leg     Current Outpatient Medications   Medication Sig Dispense Refill    atorvastatin (LIPITOR) 20 mg tablet TAKE 1 TABLET BY MOUTH EVERY DAY 90 Tab 1    sertraline (ZOLOFT) 100 mg tablet Take 2 Tabs by mouth daily. 180 Tab 1    clopidogreL (PLAVIX) 75 mg tab TAKE 1 TABLET BY MOUTH EVERY DAY 90 Tab 1    levothyroxine (SYNTHROID) 50 mcg tablet Take 1 Tab by mouth Daily (before breakfast).  90 Tab 1    divalproex DR (DEPAKOTE) 500 mg tablet Take 1 Tab by mouth two (2) times a day. 180 Tab 3    OTHER,NON-FORMULARY, Diagnosis: right upper extremity weakness,hand spasm,stiffness from previous stroke      Occupation therapy: please evaluate and treat. 1 Each 0    mv-min/iron/folic/calcium/vitK (WOMEN'S MULTIVITAMIN PO) Take  by mouth.  Calcium Carbonate-Vit D3-Min (CALTRATE 600+D PLUS MINERALS) 600 mg calcium- 400 unit tab Take 1 Tab by mouth daily.  fish oil-omega-3 fatty acids 340-1,000 mg capsule Take 1 Cap by mouth daily.  (Patient not taking: Reported on 5/24/2021)       Allergies   Allergen Reactions    Rofecoxib Hives and Rash     Other reaction(s): Hives, Hives    Aleve [Naproxen Sodium] Nausea and Vomiting    Motrin [Ibuprofen] Nausea and Vomiting    Percocet [Oxycodone-Acetaminophen] Other (comments)     Other reaction(s): Hallucinations (finding)  hallucinations       Family History   Problem Relation Age of Onset    Stroke Mother 67        Cerebral hemorrhage    Cancer Sister 52        Breast    Breast Cancer Sister 52    Diabetes Father      Social History     Tobacco Use    Smoking status: Never Smoker    Smokeless tobacco: Never Used   Substance Use Topics    Alcohol use: No     Alcohol/week: 0.0 standard drinks         Teagan Rivas MD

## 2021-06-13 RX ORDER — LEVOTHYROXINE SODIUM 50 UG/1
TABLET ORAL
Qty: 90 TABLET | Refills: 1 | Status: SHIPPED | OUTPATIENT
Start: 2021-06-13 | End: 2021-12-05

## 2021-06-27 RX ORDER — CLOPIDOGREL BISULFATE 75 MG/1
TABLET ORAL
Qty: 90 TABLET | Refills: 1 | Status: SHIPPED | OUTPATIENT
Start: 2021-06-27 | End: 2021-12-19

## 2021-07-17 DIAGNOSIS — F32.A DEPRESSION, UNSPECIFIED DEPRESSION TYPE: ICD-10-CM

## 2021-07-19 ENCOUNTER — TRANSCRIBE ORDER (OUTPATIENT)
Dept: SCHEDULING | Age: 76
End: 2021-07-19

## 2021-07-19 DIAGNOSIS — Z12.31 OTHER SCREENING MAMMOGRAM: Primary | ICD-10-CM

## 2021-07-19 RX ORDER — SERTRALINE HYDROCHLORIDE 100 MG/1
TABLET, FILM COATED ORAL
Qty: 180 TABLET | Refills: 1 | Status: SHIPPED | OUTPATIENT
Start: 2021-07-19 | End: 2022-01-13

## 2021-07-27 ENCOUNTER — OFFICE VISIT (OUTPATIENT)
Dept: NEUROLOGY | Age: 76
End: 2021-07-27
Payer: MEDICARE

## 2021-07-27 VITALS
BODY MASS INDEX: 22.36 KG/M2 | HEIGHT: 70 IN | OXYGEN SATURATION: 96 % | HEART RATE: 77 BPM | WEIGHT: 156.2 LBS | DIASTOLIC BLOOD PRESSURE: 60 MMHG | SYSTOLIC BLOOD PRESSURE: 110 MMHG

## 2021-07-27 DIAGNOSIS — G40.209 PARTIAL SYMPTOMATIC EPILEPSY WITH COMPLEX PARTIAL SEIZURES, NOT INTRACTABLE, WITHOUT STATUS EPILEPTICUS (HCC): Primary | ICD-10-CM

## 2021-07-27 DIAGNOSIS — I69.398 SPASTICITY AS LATE EFFECT OF CEREBROVASCULAR ACCIDENT (CVA): ICD-10-CM

## 2021-07-27 DIAGNOSIS — R25.2 SPASTICITY AS LATE EFFECT OF CEREBROVASCULAR ACCIDENT (CVA): ICD-10-CM

## 2021-07-27 PROCEDURE — G9899 SCRN MAM PERF RSLTS DOC: HCPCS | Performed by: PSYCHIATRY & NEUROLOGY

## 2021-07-27 PROCEDURE — G8420 CALC BMI NORM PARAMETERS: HCPCS | Performed by: PSYCHIATRY & NEUROLOGY

## 2021-07-27 PROCEDURE — 99215 OFFICE O/P EST HI 40 MIN: CPT | Performed by: PSYCHIATRY & NEUROLOGY

## 2021-07-27 PROCEDURE — G8427 DOCREV CUR MEDS BY ELIG CLIN: HCPCS | Performed by: PSYCHIATRY & NEUROLOGY

## 2021-07-27 PROCEDURE — G8400 PT W/DXA NO RESULTS DOC: HCPCS | Performed by: PSYCHIATRY & NEUROLOGY

## 2021-07-27 PROCEDURE — 3017F COLORECTAL CA SCREEN DOC REV: CPT | Performed by: PSYCHIATRY & NEUROLOGY

## 2021-07-27 PROCEDURE — G8536 NO DOC ELDER MAL SCRN: HCPCS | Performed by: PSYCHIATRY & NEUROLOGY

## 2021-07-27 PROCEDURE — 1101F PT FALLS ASSESS-DOCD LE1/YR: CPT | Performed by: PSYCHIATRY & NEUROLOGY

## 2021-07-27 PROCEDURE — G9717 DOC PT DX DEP/BP F/U NT REQ: HCPCS | Performed by: PSYCHIATRY & NEUROLOGY

## 2021-07-27 PROCEDURE — 1090F PRES/ABSN URINE INCON ASSESS: CPT | Performed by: PSYCHIATRY & NEUROLOGY

## 2021-07-27 RX ORDER — DIVALPROEX SODIUM 500 MG/1
500 TABLET, DELAYED RELEASE ORAL 2 TIMES DAILY
Qty: 180 TABLET | Refills: 3 | Status: SHIPPED | OUTPATIENT
Start: 2021-07-27 | End: 2022-06-14 | Stop reason: SDUPTHER

## 2021-07-27 NOTE — PROGRESS NOTES
Chief Complaint   Patient presents with    Follow-up    Stroke       HPI          72-year-old woman here to follow-up. 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 She has post stroke symptomatic epilepsy. Since I saw her last she has been doing very well. Her  is present. No seizures. She continues to be very motivated to improve her language but oftentimes will be struggling with the reality that she will not improve back to her baseline. Today she is tearful at the thought she cannot improve her language. She still sees occupational therapy but is taking a summer break and plans to resume soon. She has reading and writing at a 6 grade level. She enjoys reading. 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 She wants to contribute back to society particular from an educational perspective. She continues to see primary care and Occupational Therapy. History of psychiatric admissions for mood and behavioral concerns. She remains on Depakote. Mood is stable.       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     Family History   Problem Relation Age of Onset    Stroke Mother 67        Cerebral hemorrhage    Cancer Sister 52        Breast    Breast Cancer Sister 52    Diabetes Father      Social History     Socioeconomic History    Marital status:      Spouse name: Not on file    Number of children: Not on file    Years of education: Not on file    Highest education level: Not on file   Occupational History    Not on file   Tobacco Use    Smoking status: Never Smoker    Smokeless tobacco: Never Used   Substance and Sexual Activity    Alcohol use: No     Alcohol/week: 0.0 standard drinks    Drug use: No     Types: OTC    Sexual activity: Not Currently     Partners: Male   Other Topics Concern    Not on file   Social History Narrative    Not on file     Social Determinants of Health     Financial Resource Strain:     Difficulty of Paying Living Expenses:    Food Insecurity:     Worried About Running Out of Food in the Last Year:     Ran Out of Food in the Last Year:    Transportation Needs:     Lack of Transportation (Medical):  Lack of Transportation (Non-Medical):    Physical Activity:     Days of Exercise per Week:     Minutes of Exercise per Session:    Stress:     Feeling of Stress :    Social Connections:     Frequency of Communication with Friends and Family:     Frequency of Social Gatherings with Friends and Family:     Attends Islam Services:     Active Member of Clubs or Organizations:     Attends Club or Organization Meetings:     Marital Status:    Intimate Partner Violence:     Fear of Current or Ex-Partner:     Emotionally Abused:     Physically Abused:     Sexually Abused: Allergies   Allergen Reactions    Rofecoxib Hives and Rash     Other reaction(s): Hives, Hives    Aleve [Naproxen Sodium] Nausea and Vomiting    Motrin [Ibuprofen] Nausea and Vomiting    Percocet [Oxycodone-Acetaminophen] Other (comments)     Other reaction(s): Hallucinations (finding)  hallucinations         Current Outpatient Medications   Medication Sig    divalproex DR (DEPAKOTE) 500 mg tablet Take 1 Tablet by mouth two (2) times a day.     sertraline (ZOLOFT) 100 mg tablet TAKE 2 TABLETS BY MOUTH EVERY DAY    clopidogreL (PLAVIX) 75 mg tab TAKE 1 TABLET BY MOUTH EVERY DAY    levothyroxine (SYNTHROID) 50 mcg tablet TAKE 1 TABLET BY MOUTH EVERY DAY BEFORE BREAKFAST    atorvastatin (LIPITOR) 20 mg tablet TAKE 1 TABLET BY MOUTH EVERY DAY    OTHER,NON-FORMULARY, Diagnosis: right upper extremity weakness,hand spasm,stiffness from previous stroke      Occupation therapy: please evaluate and treat.  mv-min/iron/folic/calcium/vitK (WOMEN'S MULTIVITAMIN PO) Take  by mouth.  Calcium Carbonate-Vit D3-Min (CALTRATE 600+D PLUS MINERALS) 600 mg calcium- 400 unit tab Take 1 Tab by mouth daily.  fish oil-omega-3 fatty acids 340-1,000 mg capsule Take 1 Cap by mouth daily. (Patient not taking: Reported on 5/24/2021)     No current facility-administered medications for this visit. Neurologic Exam     Mental Status   WD/WN adult in NAD, normal grooming  VSS  A&O x 3 tearful and frustrated affect    PERRL, nonicteric  Face is symmetric, tongue midline  Speech is nonfluent  No limb ataxia. No abnl movements. Right distal hand flaccid with increased tone, non-contracted  Moving all extemities spontaneously and symmetric  Normal gait             Visit Vitals  /60   Pulse 77   Ht 5' 10\" (1.778 m)   Wt 156 lb 3.2 oz (70.9 kg)   SpO2 96%   BMI 22.41 kg/m²       Assessment and Plan   Diagnoses and all orders for this visit:    1. Partial symptomatic epilepsy with complex partial seizures, not intractable, without status epilepticus ([facility] [address] [facility]    2. Spasticity as late effect of cerebrovascular accident (CVA)    Other orders  -     divalproex DR (DEPAKOTE) 500 mg tablet; Take 1 Tablet by mouth two (2) times a day. 82-year-old woman who has right hemiparesis and aphasia secondary to stroke with subsequent epilepsy. Overall she is doing very well clinically. No breakthrough seizures. I want her to resume seeing OT this fall as planned. I spoke with her directly that she is not going to regain the level of language she had prestroke. Stay on Plavix and Depakote. Recent LFTS wnl.  45 minutes of time was spent total reviewing the medical record to include a significant portion of the time face-to-face with her and her , completion of documentation. She is very motivated to improve her language.   She is seeing OT very soon this month for follow-up. I am not making any medication changes. Continue Plavix and Depakote. I would like to see her annually or sooner if something changes acutely. 20 minutes of time was spent face-to-face with her and her  discussing her condition and progress thus far. I reviewed and decided to continue the current medications. This clinical note was dictated with an electronic dictation software that can make unintentional errors. If there are any questions, please contact me directly for clarification.       2 [facility] [address]  Diplomate ALEXANDRAN

## 2021-08-11 ENCOUNTER — HOSPITAL ENCOUNTER (OUTPATIENT)
Dept: MAMMOGRAPHY | Age: 76
Discharge: HOME | End: 2021-08-11
Attending: INTERNAL MEDICINE
Payer: MEDICARE

## 2021-08-11 DIAGNOSIS — Z12.31 OTHER SCREENING MAMMOGRAM: ICD-10-CM

## 2021-08-11 PROCEDURE — 77067 SCR MAMMO BI INCL CAD: CPT

## 2021-08-22 RX ORDER — ATORVASTATIN CALCIUM 20 MG/1
TABLET, FILM COATED ORAL
Qty: 90 TABLET | Refills: 1 | Status: SHIPPED | OUTPATIENT
Start: 2021-08-22 | End: 2022-02-14

## 2021-09-01 ENCOUNTER — TELEPHONE (OUTPATIENT)
Dept: INTERNAL MEDICINE CLINIC | Age: 76
End: 2021-09-01

## 2021-09-01 DIAGNOSIS — M24.541 CONTRACTURE, RIGHT HAND: ICD-10-CM

## 2021-09-01 DIAGNOSIS — I69.322 CVA, OLD, DYSARTHRIA: Primary | ICD-10-CM

## 2021-09-01 NOTE — TELEPHONE ENCOUNTER
Pt wants an order  To [facility]   For OT  WANTS TO    Friday  About 1pm any question call    [phone]

## 2021-10-13 ENCOUNTER — OFFICE VISIT (OUTPATIENT)
Dept: INTERNAL MEDICINE CLINIC | Age: 76
End: 2021-10-13
Payer: MEDICARE

## 2021-10-13 VITALS
RESPIRATION RATE: 16 BRPM | DIASTOLIC BLOOD PRESSURE: 67 MMHG | BODY MASS INDEX: 22.88 KG/M2 | OXYGEN SATURATION: 97 % | SYSTOLIC BLOOD PRESSURE: 105 MMHG | TEMPERATURE: 97.2 F | HEIGHT: 70 IN | HEART RATE: 65 BPM | WEIGHT: 159.8 LBS

## 2021-10-13 DIAGNOSIS — Z23 NEEDS FLU SHOT: ICD-10-CM

## 2021-10-13 DIAGNOSIS — R73.01 IFG (IMPAIRED FASTING GLUCOSE): ICD-10-CM

## 2021-10-13 DIAGNOSIS — I69.322 CVA, OLD, DYSARTHRIA: ICD-10-CM

## 2021-10-13 DIAGNOSIS — E03.9 HYPOTHYROIDISM, ADULT: ICD-10-CM

## 2021-10-13 DIAGNOSIS — Z79.899 ENCOUNTER FOR LONG-TERM (CURRENT) USE OF MEDICATIONS: ICD-10-CM

## 2021-10-13 DIAGNOSIS — F32.A MILD DEPRESSION: Primary | ICD-10-CM

## 2021-10-13 DIAGNOSIS — M24.541 CONTRACTURE, RIGHT HAND: ICD-10-CM

## 2021-10-13 LAB
ALBUMIN SERPL-MCNC: 3.8 G/DL (ref 3.5–5)
ALBUMIN/GLOB SERPL: 1.1 {RATIO} (ref 1.1–2.2)
ALP SERPL-CCNC: 64 U/L (ref 45–117)
ALT SERPL-CCNC: 18 U/L (ref 12–78)
ANION GAP SERPL CALC-SCNC: 6 MMOL/L (ref 5–15)
AST SERPL-CCNC: 11 U/L (ref 15–37)
BASOPHILS # BLD: 0 K/UL (ref 0–0.1)
BASOPHILS NFR BLD: 0 % (ref 0–1)
BILIRUB SERPL-MCNC: 0.4 MG/DL (ref 0.2–1)
BUN SERPL-MCNC: 19 MG/DL (ref 6–20)
BUN/CREAT SERPL: 17 (ref 12–20)
CALCIUM SERPL-MCNC: 9.6 MG/DL (ref 8.5–10.1)
CHLORIDE SERPL-SCNC: 109 MMOL/L (ref 97–108)
CO2 SERPL-SCNC: 25 MMOL/L (ref 21–32)
CREAT SERPL-MCNC: 1.09 MG/DL (ref 0.55–1.02)
DIFFERENTIAL METHOD BLD: ABNORMAL
EOSINOPHIL # BLD: 0 K/UL (ref 0–0.4)
EOSINOPHIL NFR BLD: 0 % (ref 0–7)
ERYTHROCYTE [DISTWIDTH] IN BLOOD BY AUTOMATED COUNT: 14.8 % (ref 11.5–14.5)
EST. AVERAGE GLUCOSE BLD GHB EST-MCNC: 120 MG/DL
GLOBULIN SER CALC-MCNC: 3.6 G/DL (ref 2–4)
GLUCOSE SERPL-MCNC: 85 MG/DL (ref 65–100)
HBA1C MFR BLD: 5.8 % (ref 4–5.6)
HCT VFR BLD AUTO: 36.7 % (ref 35–47)
HGB BLD-MCNC: 12.2 G/DL (ref 11.5–16)
IMM GRANULOCYTES # BLD AUTO: 0 K/UL (ref 0–0.04)
IMM GRANULOCYTES NFR BLD AUTO: 0 % (ref 0–0.5)
LYMPHOCYTES # BLD: 2 K/UL (ref 0.8–3.5)
LYMPHOCYTES NFR BLD: 37 % (ref 12–49)
MCH RBC QN AUTO: 29.4 PG (ref 26–34)
MCHC RBC AUTO-ENTMCNC: 33.2 G/DL (ref 30–36.5)
MCV RBC AUTO: 88.4 FL (ref 80–99)
MONOCYTES # BLD: 0.7 K/UL (ref 0–1)
MONOCYTES NFR BLD: 13 % (ref 5–13)
NEUTS SEG # BLD: 2.6 K/UL (ref 1.8–8)
NEUTS SEG NFR BLD: 48 % (ref 32–75)
NRBC # BLD: 0 K/UL (ref 0–0.01)
NRBC BLD-RTO: 0 PER 100 WBC
PLATELET # BLD AUTO: 123 K/UL (ref 150–400)
POTASSIUM SERPL-SCNC: 4 MMOL/L (ref 3.5–5.1)
PROT SERPL-MCNC: 7.4 G/DL (ref 6.4–8.2)
RBC # BLD AUTO: 4.15 M/UL (ref 3.8–5.2)
SODIUM SERPL-SCNC: 140 MMOL/L (ref 136–145)
T4 FREE SERPL-MCNC: 0.8 NG/DL (ref 0.8–1.5)
TSH SERPL DL<=0.05 MIU/L-ACNC: 3.93 UIU/ML (ref 0.36–3.74)
WBC # BLD AUTO: 5.4 K/UL (ref 3.6–11)

## 2021-10-13 PROCEDURE — 1101F PT FALLS ASSESS-DOCD LE1/YR: CPT | Performed by: INTERNAL MEDICINE

## 2021-10-13 PROCEDURE — G0463 HOSPITAL OUTPT CLINIC VISIT: HCPCS | Performed by: INTERNAL MEDICINE

## 2021-10-13 PROCEDURE — G8420 CALC BMI NORM PARAMETERS: HCPCS | Performed by: INTERNAL MEDICINE

## 2021-10-13 PROCEDURE — G8427 DOCREV CUR MEDS BY ELIG CLIN: HCPCS | Performed by: INTERNAL MEDICINE

## 2021-10-13 PROCEDURE — 99214 OFFICE O/P EST MOD 30 MIN: CPT | Performed by: INTERNAL MEDICINE

## 2021-10-13 PROCEDURE — G8400 PT W/DXA NO RESULTS DOC: HCPCS | Performed by: INTERNAL MEDICINE

## 2021-10-13 PROCEDURE — 3017F COLORECTAL CA SCREEN DOC REV: CPT | Performed by: INTERNAL MEDICINE

## 2021-10-13 PROCEDURE — G8536 NO DOC ELDER MAL SCRN: HCPCS | Performed by: INTERNAL MEDICINE

## 2021-10-13 PROCEDURE — 1090F PRES/ABSN URINE INCON ASSESS: CPT | Performed by: INTERNAL MEDICINE

## 2021-10-13 PROCEDURE — G9717 DOC PT DX DEP/BP F/U NT REQ: HCPCS | Performed by: INTERNAL MEDICINE

## 2021-10-13 PROCEDURE — 90694 VACC AIIV4 NO PRSRV 0.5ML IM: CPT | Performed by: INTERNAL MEDICINE

## 2021-10-13 PROCEDURE — G9899 SCRN MAM PERF RSLTS DOC: HCPCS | Performed by: INTERNAL MEDICINE

## 2021-10-13 RX ORDER — LOTEPREDNOL ETABONATE 5 MG/G
GEL OPHTHALMIC
COMMUNITY
Start: 2021-08-30

## 2021-10-13 NOTE — PATIENT INSTRUCTIONS
Vaccine Information Statement    Influenza (Flu) Vaccine (Inactivated or Recombinant): What You Need to Know    Many vaccine information statements are available in [place] and other languages. See www.immunize.org/vis. Hojas de información sobre vacunas están disponibles en español y en muchos otros idiomas. Visite www.immunize.org/vis. 1. Why get vaccinated? Influenza vaccine can prevent influenza (flu). Flu is a contagious disease that spreads around the [facility] every year, usually between October and May. Anyone can get the flu, but it is more dangerous for some people. Infants and young children, people 72 years and older, pregnant people, and people with certain health conditions or a weakened immune system are at greatest risk of flu complications. Pneumonia, bronchitis, sinus infections, and ear infections are examples of flu-related complications. If you have a medical condition, such as heart disease, cancer, or diabetes, flu can make it worse. Flu can cause fever and chills, sore throat, muscle aches, fatigue, cough, headache, and runny or stuffy nose. Some people may have vomiting and diarrhea, though this is more common in children than adults. In an average year, thousands of people in the [facility] die from flu, and many more are hospitalized. Flu vaccine prevents millions of illnesses and flu-related visits to the doctor each year. 2. Influenza vaccines     CDC recommends everyone 6 months and older get vaccinated every flu season. Children 6 months through 6years of age may need 2 doses during a single flu season. Everyone else needs only 1 dose each flu season. It takes about 2 weeks for protection to develop after vaccination. There are many flu viruses, and they are always changing. Each year a new flu vaccine is made to protect against the influenza viruses believed to be likely to cause disease in the upcoming flu season.  Even when the vaccine doesnt exactly match these viruses, it may still provide some protection. Influenza vaccine does not cause flu. Influenza vaccine may be given at the same time as other vaccines. 3. Talk with your health care provider    Tell your vaccination provider if the person getting the vaccine:   Has had an allergic reaction after a previous dose of influenza vaccine, or has any severe, life-threatening allergies    Has ever had Guillain-Barré Syndrome (also called GBS)    In some cases, your health care provider may decide to postpone influenza vaccination until a future visit. Influenza vaccine can be administered at any time during pregnancy. People who are or will be pregnant during influenza season should receive inactivated influenza vaccine. People with minor illnesses, such as a cold, may be vaccinated. People who are moderately or severely ill should usually wait until they recover before getting influenza vaccine. Your health care provider can give you more information. 4. Risks of a vaccine reaction     Soreness, redness, and swelling where the shot is given, fever, muscle aches, and headache can happen after influenza vaccination.  There may be a very small increased risk of Guillain-Barré Syndrome (GBS) after inactivated influenza vaccine (the flu shot). [place] children who get the flu shot along with pneumococcal vaccine (PCV13) and/or DTaP vaccine at the same time might be slightly more likely to have a seizure caused by fever. Tell your health care provider if a child who is getting flu vaccine has ever had a seizure. People sometimes faint after medical procedures, including vaccination. Tell your provider if you feel dizzy or have vision changes or ringing in the ears. As with any medicine, there is a very remote chance of a vaccine causing a severe allergic reaction, other serious injury, or death. 5. What if there is a serious problem?     An allergic reaction could occur after the vaccinated person leaves the clinic. If you see signs of a severe allergic reaction (hives, swelling of the face and throat, difficulty breathing, a fast heartbeat, dizziness, or weakness), call 9-1-1 and get the person to the nearest hospital.    For other signs that concern you, call your health care provider. Adverse reactions should be reported to the Vaccine Adverse Event Reporting System (VAERS). Your health care provider will usually file this report, or you can do it yourself. Visit the VAERS website at www.vaers. [facility].gov or call 5-944.782.3110. VAERS is only for reporting reactions, and VAERS staff members do not give medical advice. 6. The National Vaccine Injury Compensation Program    The [facility] Vaccine Injury Compensation Program (VICP) is a federal program that was created to compensate people who may have been injured by certain vaccines. Claims regarding alleged injury or death due to vaccination have a time limit for filing, which may be as short as two years. Visit the VICP website at www.[facility].gov/vaccinecompensation or call 0-104.595.6577 to learn about the program and about filing a claim. 7. How can I learn more?  Ask your health care provider.  Call your local or state health department.  Visit the website of the Food and Drug Administration (FDA) for vaccine package inserts and additional information at www.fda.gov/vaccines-blood-biologics/vaccines.  Contact the Centers for Disease Control and Prevention (CDC):  - Call 2-586.633.9054 (1-800-CDC-INFO) or  - Visit CDCs influenza website at www.cdc.gov/flu. Vaccine Information Statement   Inactivated Influenza Vaccine   8/6/2021  42 NIVIA Recinos 410TF-29   Department of Health and Human Services  Centers for Disease Control and Prevention    Office Use Only

## 2021-10-13 NOTE — PROGRESS NOTES
Assessment/Plan:           1. Mild depression ([facility] [address] [facility]  -has some mood swings. Currently taking zoloft 100mg bid   -will be reestablishing with [facility]    2. Hypothyroidism, adult  -taking levothyroxine correctly.  wakes at 6:30am to give her medication.     - METABOLIC PANEL, COMPREHENSIVE; Future  - TSH 3RD GENERATION; Future  - T4, FREE; Future  - CBC WITH AUTOMATED DIFF; Future    3. CVA, old, dysarthria  -working with speech therapy. Speech clearer  -still reading at elementary school level. 4. Contracture, right hand  -working with OT at [address] [place] [address]. Able to extend fingers now. Using brace    5. IFG (impaired fasting glucose)  -encouraged her to lower the carbohydrates in her diet. - METABOLIC PANEL, COMPREHENSIVE; Future  - HEMOGLOBIN A1C WITH EAG; Future    6. Encounter for long-term (current) use of medications      7. Needs flu shot  - Patient received flu vaccine today without any complications.     - FLU (FLUAD QUAD INFLUENZA VACCINE,QUAD,ADJUVANTED)     8. Health Care Maintenance   -due for screening colonoscopy -  will call GI to schedule.       Orders Placed This Encounter    Influenza Vaccine, QUAD, 65 Yrs +  IM  (Fluad 53868 )    METABOLIC PANEL, COMPREHENSIVE     Standing Status:   Future     Number of Occurrences:   1     Standing Expiration Date:   10/13/2022    TSH 3RD GENERATION     Standing Status:   Future     Number of Occurrences:   1     Standing Expiration Date:   10/13/2022    T4, FREE     Standing Status:   Future     Number of Occurrences:   1     Standing Expiration Date:   10/13/2022    CBC WITH AUTOMATED DIFF     Standing Status:   Future     Number of Occurrences:   1     Standing Expiration Date:   10/13/2022    HEMOGLOBIN A1C WITH EAG     Standing Status:   Future     Number of Occurrences:   1     Standing Expiration Date:   10/13/2022    Lotemax 0.5 % drpg     Sig: PLACE 1 DROP INTO BOTH EYES ONCE OR TWICE A DAY AS NEEDED        Follow-up Disposition:     Follow up in 6 months             Subjective:      Anastasiia Costa is a 78 y.o. female who presents today for her follow up of her depression, hypothyroid, IFG and hand contracture and dysarthria from history of hemorrhagic stroke    Since last visit 5/24/2021:  -had follow up with Dr. Mary Kelly in July, 2021. No changes made. Continued on current dose of depakote. Asked to follow up yearly.   -she continues to work with the OT and speech therapist at [facility]. Contracture of her right hand is very slowly improving. Using brace, daily exercises. Is able to extend fingers almost flat. Still have very difficulty with her speech. She is reading and doing her work apps daily. She is very motivated to do her therapy. -her  is currently reestablishing her relationship with [facility] psychiatry. Patient Care Team:  -Dr. Mary Kelly, neurology    Due for:  -screening colonoscopy        Objective: Wt Readings from Last 3 Encounters:   10/13/21 159 lb 12.8 oz (72.5 kg)   07/27/21 156 lb 3.2 oz (70.9 kg)   05/24/21 158 lb (71.7 kg)     BP Readings from Last 3 Encounters:   10/13/21 105/67   07/27/21 110/60   05/24/21 107/66     Visit Vitals  /67   Pulse 65   Temp 97.2 °F (36.2 °C) (Temporal)   Resp 16   Ht 5' 10\" (1.778 m)   Wt 159 lb 12.8 oz (72.5 kg)   SpO2 97%   BMI 22.93 kg/m²     General appearance: alert, cooperative, no distress, appears stated age  Head: Normocephalic, without obvious abnormality, atraumatic  Neck: supple, symmetrical, trachea midline, no adenopathy, no carotid bruit and no JVD  Lungs: clear to auscultation bilaterally  Heart: regular rate and rhythm, S1, S2 normal, no murmur, click, rub or gallop  Abdomen: soft, non-tender. Bowel sounds normal. No masses,  no organomegaly  Extremities: no edema, right hand fingers contracted slightly. Pulses: 2+ and symmetric              Disclaimer:  Return if symptoms worsen or fail to improve.    Advised patient to call back or return to office if symptoms worsen/change/persist.     Discussed expected course/resolution/complications of diagnosis in detail with patient. Medication risks/benefits/costs/interactions/alternatives discussed with patient. Patient was given an after visit summary which includes diagnoses, current medications, & vitals. Patient expressed understanding with the diagnosis and plan.

## 2021-12-19 RX ORDER — CLOPIDOGREL BISULFATE 75 MG/1
TABLET ORAL
Qty: 90 TABLET | Refills: 1 | Status: SHIPPED | OUTPATIENT
Start: 2021-12-19 | End: 2022-06-15

## 2021-12-21 ENCOUNTER — TELEPHONE (OUTPATIENT)
Dept: INTERNAL MEDICINE CLINIC | Age: 76
End: 2021-12-21

## 2021-12-21 DIAGNOSIS — R32 URINARY INCONTINENCE, UNSPECIFIED TYPE: Primary | ICD-10-CM

## 2021-12-21 NOTE — TELEPHONE ENCOUNTER
----- Message from Lenny Garg sent at 12/21/2021 11:39 AM EST -----  Subject: Referral Request    QUESTIONS   Reason for referral request? [facility] Occ. Therapy for incomitance. Has the physician seen you for this condition before? No   Preferred Specialist (if applicable)? Do you already have an appointment scheduled? No  Additional Information for Provider?   ---------------------------------------------------------------------------  --------------  CALL BACK INFO  What is the best way for the office to contact you? OK to leave message on   voicemail  Preferred Call Back Phone Number?  [phone]

## 2021-12-21 NOTE — TELEPHONE ENCOUNTER
Patient currently working with PT and OT for dysarthria and hand contracture from CVA. OT asked for order to help with urinary incontinence.       Orders Placed This Encounter    REFERRAL TO OCCUPATIONAL THERAPY     Referral Priority:   Routine     Referral Type:   PT/OT/ST     Referral Reason:   Specialty Services Required     Number of Visits Requested:   1

## 2021-12-27 ENCOUNTER — TELEPHONE (OUTPATIENT)
Dept: INTERNAL MEDICINE CLINIC | Age: 76
End: 2021-12-27

## 2021-12-27 DIAGNOSIS — R32 URINARY INCONTINENCE, UNSPECIFIED TYPE: Primary | ICD-10-CM

## 2021-12-27 NOTE — TELEPHONE ENCOUNTER
Reason for call:  Pt's , Luis Gomez, called re pt's recent referral to [address] [place] [address].  stated that [facility] informed him that the referral was written incorrectly. States that the order is OT for urinary incontinence. Referral needs to be PT for gynecological therapy per [facility].  requested call back if Du has any questions. Stated it is okay to leave message if he does not answer. Also requested call back for when new referral is ready for him to .     Is this a new problem: yes     Date of last appointment:  10/13/2021     Can we respond via OMsignal: no    Best call back number: [phone]

## 2022-01-11 ENCOUNTER — TELEPHONE (OUTPATIENT)
Dept: NEUROLOGY | Age: 77
End: 2022-01-11

## 2022-01-11 NOTE — TELEPHONE ENCOUNTER
Patient's  called stating pt is having a colonoscopy on 1/26/22 and they need to know if she needs to be taken off blood thinners for procedure. Please call.

## 2022-01-11 NOTE — TELEPHONE ENCOUNTER
If Patient has to stop Plavix for her colonoscopy it should be discontinued 3 to 5 days before the procedure depending on the comfort level of the doctor doing the procedure. It should be resumed immediately once cleared after the procedure. Continue Depakote.

## 2022-01-13 DIAGNOSIS — F32.A DEPRESSION, UNSPECIFIED DEPRESSION TYPE: ICD-10-CM

## 2022-01-13 RX ORDER — SERTRALINE HYDROCHLORIDE 100 MG/1
TABLET, FILM COATED ORAL
Qty: 180 TABLET | Refills: 1 | Status: SHIPPED | OUTPATIENT
Start: 2022-01-13 | End: 2022-07-15

## 2022-01-13 NOTE — TELEPHONE ENCOUNTER
Patient's  came by to ask that someone contact [facility] - Dr. Leesa Argueta @ [phone] concerning the message below. Also, please call him so he is aware of what to do. Her appointment is on 1/26.

## 2022-01-19 NOTE — TELEPHONE ENCOUNTER
Patient needs a fax sent to [facility] office with information about the message below for the blood thinners.     Fax: [phone]

## 2022-01-19 NOTE — TELEPHONE ENCOUNTER
I is requesting a letter advising of Dr. Elian Amezquita recommendations be faxed over prior to the patient's appointment next week.

## 2022-01-20 ENCOUNTER — TELEPHONE (OUTPATIENT)
Dept: NEUROLOGY | Age: 77
End: 2022-01-20

## 2022-01-20 NOTE — TELEPHONE ENCOUNTER
Patient is having a colonoscopy on Monday and the gastroenterologist needs to have the Dr's ok that they can stop the blood thinner for this procedure. This needs to be received by Friday. Thank you. [facility]     Fax: [phone]    Dr. Rory Rolon    Please let them know when this happens.

## 2022-01-21 ENCOUNTER — HOSPITAL ENCOUNTER (OUTPATIENT)
Dept: PREADMISSION TESTING | Age: 77
Discharge: HOME | End: 2022-01-21
Attending: INTERNAL MEDICINE
Payer: MEDICARE

## 2022-01-21 ENCOUNTER — TRANSCRIBE ORDER (OUTPATIENT)
Dept: REGISTRATION | Age: 77
End: 2022-01-21

## 2022-01-21 DIAGNOSIS — U07.1 COVID-19: Primary | ICD-10-CM

## 2022-01-21 DIAGNOSIS — U07.1 COVID-19: ICD-10-CM

## 2022-01-21 PROCEDURE — U0005 INFEC AGEN DETEC AMPLI PROBE: HCPCS

## 2022-01-21 NOTE — TELEPHONE ENCOUNTER
Patient's  called back, verified and advised that telephone encounter stating Dr. Sonny Garcia recommedations have been faxed over to [facility] and confirmation was received.

## 2022-01-23 LAB
SARS-COV-2, XPLCVT: NOT DETECTED
SOURCE, COVRS: NORMAL

## 2022-01-26 ENCOUNTER — ANESTHESIA (OUTPATIENT)
Dept: ENDOSCOPY | Age: 77
End: 2022-01-26
Payer: MEDICARE

## 2022-01-26 ENCOUNTER — ANESTHESIA EVENT (OUTPATIENT)
Dept: ENDOSCOPY | Age: 77
End: 2022-01-26
Payer: MEDICARE

## 2022-01-26 ENCOUNTER — HOSPITAL ENCOUNTER (OUTPATIENT)
Age: 77
Setting detail: OUTPATIENT SURGERY
Discharge: HOME | End: 2022-01-26
Attending: INTERNAL MEDICINE | Admitting: INTERNAL MEDICINE
Payer: MEDICARE

## 2022-01-26 VITALS
TEMPERATURE: 98.2 F | SYSTOLIC BLOOD PRESSURE: 113 MMHG | WEIGHT: 160 LBS | HEART RATE: 56 BPM | HEIGHT: 70 IN | OXYGEN SATURATION: 90 % | RESPIRATION RATE: 19 BRPM | DIASTOLIC BLOOD PRESSURE: 81 MMHG | BODY MASS INDEX: 22.9 KG/M2

## 2022-01-26 PROCEDURE — 74011250636 HC RX REV CODE- 250/636: Performed by: NURSE ANESTHETIST, CERTIFIED REGISTERED

## 2022-01-26 PROCEDURE — 76040000007: Performed by: INTERNAL MEDICINE

## 2022-01-26 PROCEDURE — 88305 TISSUE EXAM BY PATHOLOGIST: CPT

## 2022-01-26 PROCEDURE — 76060000032 HC ANESTHESIA 0.5 TO 1 HR: Performed by: INTERNAL MEDICINE

## 2022-01-26 PROCEDURE — 77030021593 HC FCPS BIOP ENDOSC BSC -A: Performed by: INTERNAL MEDICINE

## 2022-01-26 RX ORDER — MIDAZOLAM HYDROCHLORIDE 1 MG/ML
.25-5 INJECTION, SOLUTION INTRAMUSCULAR; INTRAVENOUS
Status: DISCONTINUED | OUTPATIENT
Start: 2022-01-26 | End: 2022-01-26 | Stop reason: HOSPADM

## 2022-01-26 RX ORDER — FLUMAZENIL 0.1 MG/ML
0.2 INJECTION INTRAVENOUS
Status: DISCONTINUED | OUTPATIENT
Start: 2022-01-26 | End: 2022-01-26 | Stop reason: HOSPADM

## 2022-01-26 RX ORDER — SODIUM CHLORIDE 9 MG/ML
75 INJECTION, SOLUTION INTRAVENOUS CONTINUOUS
Status: DISCONTINUED | OUTPATIENT
Start: 2022-01-26 | End: 2022-01-26 | Stop reason: HOSPADM

## 2022-01-26 RX ORDER — SODIUM CHLORIDE 9 MG/ML
INJECTION, SOLUTION INTRAVENOUS
Status: DISCONTINUED | OUTPATIENT
Start: 2022-01-26 | End: 2022-01-26 | Stop reason: HOSPADM

## 2022-01-26 RX ORDER — FENTANYL CITRATE 50 UG/ML
12.5-2 INJECTION, SOLUTION INTRAMUSCULAR; INTRAVENOUS
Status: DISCONTINUED | OUTPATIENT
Start: 2022-01-26 | End: 2022-01-26 | Stop reason: HOSPADM

## 2022-01-26 RX ORDER — SODIUM CHLORIDE 0.9 % (FLUSH) 0.9 %
5-40 SYRINGE (ML) INJECTION AS NEEDED
Status: DISCONTINUED | OUTPATIENT
Start: 2022-01-26 | End: 2022-01-26 | Stop reason: HOSPADM

## 2022-01-26 RX ORDER — PROPOFOL 10 MG/ML
INJECTION, EMULSION INTRAVENOUS AS NEEDED
Status: DISCONTINUED | OUTPATIENT
Start: 2022-01-26 | End: 2022-01-26 | Stop reason: HOSPADM

## 2022-01-26 RX ORDER — ATROPINE SULFATE 0.1 MG/ML
0.5 INJECTION INTRAVENOUS
Status: DISCONTINUED | OUTPATIENT
Start: 2022-01-26 | End: 2022-01-26 | Stop reason: HOSPADM

## 2022-01-26 RX ORDER — EPINEPHRINE 0.1 MG/ML
1 INJECTION INTRACARDIAC; INTRAVENOUS
Status: DISCONTINUED | OUTPATIENT
Start: 2022-01-26 | End: 2022-01-26 | Stop reason: HOSPADM

## 2022-01-26 RX ORDER — DEXTROMETHORPHAN/PSEUDOEPHED 2.5-7.5/.8
1.2 DROPS ORAL
Status: DISCONTINUED | OUTPATIENT
Start: 2022-01-26 | End: 2022-01-26 | Stop reason: HOSPADM

## 2022-01-26 RX ORDER — SODIUM CHLORIDE 0.9 % (FLUSH) 0.9 %
5-40 SYRINGE (ML) INJECTION EVERY 8 HOURS
Status: DISCONTINUED | OUTPATIENT
Start: 2022-01-26 | End: 2022-01-26 | Stop reason: HOSPADM

## 2022-01-26 RX ORDER — NALOXONE HYDROCHLORIDE 0.4 MG/ML
0.4 INJECTION, SOLUTION INTRAMUSCULAR; INTRAVENOUS; SUBCUTANEOUS
Status: DISCONTINUED | OUTPATIENT
Start: 2022-01-26 | End: 2022-01-26 | Stop reason: HOSPADM

## 2022-01-26 RX ADMIN — PROPOFOL 40 MG: 10 INJECTION, EMULSION INTRAVENOUS at 10:48

## 2022-01-26 RX ADMIN — PROPOFOL 40 MG: 10 INJECTION, EMULSION INTRAVENOUS at 11:09

## 2022-01-26 RX ADMIN — PROPOFOL 20 MG: 10 INJECTION, EMULSION INTRAVENOUS at 10:56

## 2022-01-26 RX ADMIN — PROPOFOL 80 MG: 10 INJECTION, EMULSION INTRAVENOUS at 10:45

## 2022-01-26 RX ADMIN — PROPOFOL 30 MG: 10 INJECTION, EMULSION INTRAVENOUS at 10:59

## 2022-01-26 RX ADMIN — PROPOFOL 30 MG: 10 INJECTION, EMULSION INTRAVENOUS at 10:54

## 2022-01-26 RX ADMIN — SODIUM CHLORIDE: 900 INJECTION, SOLUTION INTRAVENOUS at 10:44

## 2022-01-26 RX ADMIN — PROPOFOL 30 MG: 10 INJECTION, EMULSION INTRAVENOUS at 10:51

## 2022-01-26 RX ADMIN — PROPOFOL 30 MG: 10 INJECTION, EMULSION INTRAVENOUS at 11:03

## 2022-01-26 NOTE — ANESTHESIA POSTPROCEDURE EVALUATION
Post-Anesthesia Evaluation and Assessment    Patient: Paul Ma MRN: [id number]  SSN: [id number]    YOB: 1945  Age: 78 y.o. Sex: female      I have evaluated the patient and they are stable and ready for discharge from the PACU. Cardiovascular Function/Vital Signs  Visit Vitals  /65   Pulse (!) 59   Temp 36.8 °C (98.2 °F)   Resp 14   Ht 5' 10\" (1.778 m)   Wt 72.6 kg (160 lb)   SpO2 100%   Breastfeeding No   BMI 22.96 kg/m²       Patient is status post MAC anesthesia for Procedure(s):  COLONOSCOPY   :-  ENDOSCOPIC POLYPECTOMY. Nausea/Vomiting: None    Postoperative hydration reviewed and adequate. Pain:  Pain Scale 1: Numeric (0 - 10) (01/26/22 1130)  Pain Intensity 1: 0 (01/26/22 1130)   Managed    Neurological Status: At baseline    Mental Status, Level of Consciousness: Alert and  oriented to person, place, and time    Pulmonary Status:   O2 Device: None (Room air) (01/26/22 1130)   Adequate oxygenation and airway patent    Complications related to anesthesia: None    Post-anesthesia assessment completed. No concerns    Signed By: Ruel South MD     January 26, 2022              Procedure(s):  COLONOSCOPY   :-  ENDOSCOPIC POLYPECTOMY. MAC    <[email or web address]>    INITIAL Post-op Vital signs:   Vitals Value Taken Time   /65 01/26/22 1130   Temp 36.8 °C (98.2 °F) 01/26/22 1117   Pulse 56 01/26/22 1134   Resp 18 01/26/22 1134   SpO2 100 % 01/26/22 1134   Vitals shown include unvalidated device data.

## 2022-01-26 NOTE — DISCHARGE INSTRUCTIONS
[address] [facility]  [id number]  1945    COLON DISCHARGE INSTRUCTIONS    DISCOMFORT:  Redness at IV site- apply warm compress to area; if redness or soreness persist- contact your physician  There may be a slight amount of blood passed from the rectum  Gaseous discomfort- walking, belching will help relieve any discomfort    DIET:   Regular diet. ACTIVITY:  You may resume your normal daily activities it is recommended that you spend the remainder of the day resting -  avoid any strenuous activity. You may not operate a vehicle for 12 hours  You may not engage in an occupation involving machinery or appliances for rest of today  You may not drink alcoholic beverages for at least 12 hours  Avoid making any critical decisions for at least 24 hour    CALL M.D. ANY SIGN OF:   Increasing pain, nausea, vomiting  Abdominal distension (swelling)  New increased bleeding (oral or rectal)  Fever (chills)  Pain in chest area  Bloody discharge from nose or mouth  Shortness of breath     Follow-up Instructions:   Call Dr. Artie Grossman for any questions or problems. Telephone # [phone]  Biopsy results will be available in  5 to 7 days    Impression:  -Two very small (2-3 mm) polyp found in the cecum and ascending colon (right colon), removed and sent for pathology  -Otherwise normal colon     Recommendations:   -Resume normal medication(s). Resume plavix in 2 days  -Await pathology.  -Regular diet.  -No further colorectal cancer screening recommended considering age.   -Follow up with primary care physician. Artie Grossman MD    Patient Education        Colon Polyps: Care Instructions  Your Care Instructions     Colon polyps are growths in the colon or the rectum. The cause of most colon polyps is not known, and most people who get them do not have any problems. But a certain kind can turn into cancer.  For this reason, regular testing for colon polyps is important for people as they get older. It is also important for anyone who has an increased risk for colon cancer. Polyps are usually found through routine colon cancer screening tests. Although most colon polyps are not cancerous, they are usually removed and then tested for cancer. Screening for colon cancer saves lives because the cancer can usually be cured if it is caught early. If you have a polyp that is the type that can turn into cancer, you may need more tests to examine your entire colon. The doctor will remove any other polyps that he or she finds, and you will be tested more often. Follow-up care is a key part of your treatment and safety. Be sure to make and go to all appointments, and call your doctor if you are having problems. It's also a good idea to know your test results and keep a list of the medicines you take. How can you care for yourself at home? Regular exams to look for colon polyps are the best way to prevent polyps from turning into colon cancer. These can include stool tests, sigmoidoscopy, colonoscopy, and CT colonography. Talk with your doctor about a testing schedule that is right for you. To prevent polyps  There is no home treatment that can prevent colon polyps. But these steps may help lower your risk for cancer. · Stay active. Being active can help you get to and stay at a healthy weight. Try to exercise on most days of the week. Walking is a good choice. · Eat well. Choose a variety of vegetables, fruits, legumes (such as peas and beans), fish, poultry, and whole grains. · Do not smoke. If you need help quitting, talk to your doctor about stop-smoking programs and medicines. These can increase your chances of quitting for good. · If you drink alcohol, limit how much you drink. Limit alcohol to 2 drinks a day for men and 1 drink a day for women. When should you call for help?    Call your doctor now or seek immediate medical care if:    · You have severe belly pain.     · Your stools are maroon or very bloody. Watch closely for changes in your health, and be sure to contact your doctor if:    · You have a fever.     · You have nausea or vomiting.     · You have a change in bowel habits (new constipation or diarrhea).     · Your symptoms get worse or are not improving as expected. Where can you learn more? Go to http://www.noel.com/  Enter C571 in the search box to learn more about \"Colon Polyps: Care Instructions. \"  Current as of: December 17, 2020               Content Version: 13.0  © 0453-9659 [employer]. Care instructions adapted under license by [employer] (which disclaims liability or warranty for this information). If you have questions about a medical condition or this instruction, always ask your healthcare professional. [address] any warranty or liability for your use of this information. Learning About Coronavirus [phone])  Coronavirus [phone]): Overview  What is coronavirus (COVID-19)? The coronavirus disease (COVID-19) is caused by a virus. It is an illness that was first found in [place], [place], in December 2019. It has since spread worldwide. The virus can cause fever, cough, and trouble breathing. In severe cases, it can cause pneumonia and make it hard to breathe without help. It can cause death. Coronaviruses are a large group of viruses. They cause the common cold. They also cause more serious illnesses like Middle East respiratory syndrome (MERS) and severe acute respiratory syndrome (SARS). COVID-19 is caused by a novel coronavirus. That means it's a new type that has not been seen in people before. This virus spreads person-to-person through droplets from coughing and sneezing. It can also spread when you are close to someone who is infected.  And it can spread when you touch something that has the virus on it, such as a doorknob or a tabletop. What can you do to protect yourself from coronavirus (COVID-19)? The best way to protect yourself from getting sick is to:  · Avoid areas where there is an outbreak. · Avoid contact with people who may be infected. · Wash your hands often with soap or alcohol-based hand sanitizers. · Avoid crowds and try to stay at least 6 feet away from other people. · Wash your hands often, especially after you cough or sneeze. Use soap and water, and scrub for at least 20 seconds. If soap and water aren't available, use an alcohol-based hand . · Avoid touching your mouth, nose, and eyes. What can you do to avoid spreading the virus to others? To help avoid spreading the virus to others:  · Cover your mouth with a tissue when you cough or sneeze. Then throw the tissue in the trash. · Use a disinfectant to clean things that you touch often. · Stay home if you are sick or have been exposed to the virus. Don't go to school, work, or public areas. And don't use public transportation. · If you are sick:  ? Leave your home only if you need to get medical care. But call the doctor's office first so they know you're coming. And wear a face mask, if you have one.  ? If you have a face mask, wear it whenever you're around other people. It can help stop the spread of the virus when you cough or sneeze. ? Clean and disinfect your home every day. Use household  and disinfectant wipes or sprays. Take special care to clean things that you grab with your hands. These include doorknobs, remote controls, phones, and handles on your refrigerator and microwave. And don't forget countertops, tabletops, bathrooms, and computer keyboards. When to call for help  Call 911 anytime you think you may need emergency care. For example, call if:  · You have severe trouble breathing. (You can't talk at all.)  · You have constant chest pain or pressure. · You are severely dizzy or lightheaded.   · You are confused or can't think clearly. · Your face and lips have a blue color. · You pass out (lose consciousness) or are very hard to wake up. Call your doctor now if you develop symptoms such as:  · Shortness of breath. · Fever. · Cough. If you need to get care, call ahead to the doctor's office for instructions before you go. Make sure you wear a face mask, if you have one, to prevent exposing other people to the virus. Where can you get the latest information? The following health organizations are tracking and studying this virus. Their websites contain the most up-to-date information. Apryl Colby also learn what to do if you think you may have been exposed to the virus. · U.S. Centers for Disease Control and Prevention (CDC): The CDC provides updated news about the disease and travel advice. The website also tells you how to prevent the spread of infection. www.cdc.gov  · World Health Organization [facility]): WHO offers information about the virus outbreaks. WHO also has travel advice. www.who.int  Current as of: April 1, 2020               Content Version: 12.4  © 9166-0680 [employer]. Care instructions adapted under license by your healthcare professional. If you have questions about a medical condition or this instruction, always ask your healthcare professional. [address] any warranty or liability for your use of this information.

## 2022-01-26 NOTE — PROCEDURES
[address] [facility]  [address]  [phone]               Colonoscopy Operative Report      Indications:  Average risk screening, reported normal colonoscopy more than 10 yrs back    :  Ben Ny MD    Staff: Endoscopy Technician-1: Amy Higgins  Endoscopy RN-1: Tioga Areas, RN     Referring Provider: Araceli Hilliard MD    Sedation:  MAC anesthesia    Procedure Details:  After informed consent was obtained with all risks and benefits of procedure explained and preoperative exam completed, the patient was taken to the endoscopy suite and placed in the left lateral decubitus position. Upon sequential sedation as per above, a digital rectal exam was performed  And was normal.  The Olympus videocolonoscope  was inserted in the rectum and carefully advanced to the cecum, which was identified by the ileocecal valve and appendiceal orifice. The quality of preparation was fair - adequate visualization obtained with extensive lavage. The colonoscope was slowly withdrawn with careful evaluation between folds. Retroflexion in the rectum was performed and was normal..     Findings:   Rectum: normal  Sigmoid: normal  Descending Colon: normal  Transverse Colon: normal  Ascending Colon/Cecum: 2  Sessile polyp(s), the largest 3 mm in size; Otherwise normal  Terminal Ileum: not intubated    Interventions:  2 complete polypectomy were performed using cold biopsy forceps and the polyps were  retrieved    Specimen Removed:   ID Type Source Tests Collected by Time Destination   1 : Right Colon Polyps Preservative Colon, Right  Sugar De Leon MD 1/26/2022 10[facility] Pathology       EBL:  Minimal    Complications:  None; patient tolerated the procedure well. Impression:  -Two very small (2-3 mm) polyp found in the cecum and ascending colon (right colon), removed and sent for pathology  -Otherwise normal colon     Recommendations:   -Resume normal medication(s).  Resume plavix in 2 days  -Await pathology.  -Regular diet.  -No further colorectal cancer screening recommended considering age.   -Follow up with primary care physician. Discharge Disposition:  Home in the company of a  when able to ambulate.     Ben Ny MD  1/26/2022  11:25 AM

## 2022-01-26 NOTE — PERIOP NOTES

## 2022-01-26 NOTE — ANESTHESIA PREPROCEDURE EVALUATION
Relevant Problems   NEUROLOGY   (+) Cerebrovascular accident (CVA) due to thrombosis (HCC)   (+) Depression   (+) Mild depression (HCC)   (+) Seizure (HCC)   (+) TIA (transient ischemic attack)       Anesthetic History   No history of anesthetic complications            Review of Systems / Medical History  Patient summary reviewed, nursing notes reviewed and pertinent labs reviewed    Pulmonary  Within defined limits                 Neuro/Psych     seizures  CVA  Psychiatric history     Cardiovascular  Within defined limits                     GI/Hepatic/Renal  Within defined limits              Endo/Other      Hypothyroidism       Other Findings              Physical Exam    Airway  Mallampati: II  TM Distance: > 6 cm  Neck ROM: normal range of motion   Mouth opening: Normal     Cardiovascular  Regular rate and rhythm,  S1 and S2 normal,  no murmur, click, rub, or gallop             Dental  No notable dental hx       Pulmonary  Breath sounds clear to auscultation               Abdominal  GI exam deferred       Other Findings            Anesthetic Plan    ASA: 3  Anesthesia type: MAC            Anesthetic plan and risks discussed with: Patient

## 2022-01-26 NOTE — H&P
[address] [facility]  [address] [place] [address]  [phone]        History and Physical     NAME: Pineda Rodriguez   :  1945   MRN:  [id number]         HPI:  Pineda Rodriguez is a 78 y.o. female here for screening colonoscopy. No family h/o colon cancer. Reports distant colonoscopy more than 10 yrs back. Past Surgical History:   Procedure Laterality Date    ENDOSCOPY, COLON, DIAGNOSTIC  3/8/2008    ([place])    HX HEENT      HX ORTHOPAEDIC      arthroscopic sx cristopher. shoulders after fall    HX TUBAL LIGATION      VASCULAR SURGERY PROCEDURE UNLIST      varicose vein stripping Right leg     Past Medical History:   Diagnosis Date    Anxiety     Depression     Hypovitaminosis D     Menopause     [facility]     Stroke [facility]     Stroke ([facility] 2017    Thromboembolus ([facility] [facility] 1975     RLE    Thyroid disease     Toxoplasmosis     Left eye     Social History     Tobacco Use    Smoking status: Never Smoker    Smokeless tobacco: Never Used   Substance Use Topics    Alcohol use: No     Alcohol/week: 0.0 standard drinks    Drug use: No     Types: OTC     No current facility-administered medications on file prior to encounter. Current Outpatient Medications on File Prior to Encounter   Medication Sig Dispense Refill    Lotemax 0.5 % drpg PLACE 1 DROP INTO BOTH EYES ONCE OR TWICE A DAY AS NEEDED      atorvastatin (LIPITOR) 20 mg tablet TAKE 1 TABLET BY MOUTH EVERY DAY 90 Tablet 1    divalproex DR (DEPAKOTE) 500 mg tablet Take 1 Tablet by mouth two (2) times a day. 180 Tablet 3    OTHER,NON-FORMULARY, Diagnosis: right upper extremity weakness,hand spasm,stiffness from previous stroke      Occupation therapy: please evaluate and treat. 1 Each 0    fish oil-omega-3 fatty acids 340-1,000 mg capsule Take 1 Capsule by mouth daily.       Calcium Carbonate-Vit D3-Min (CALTRATE 600+D PLUS MINERALS) 600 mg calcium- 400 unit tab Take 1 Tab by mouth daily.  mv-min/iron/folic/calcium/vitK (WOMEN'S MULTIVITAMIN PO) Take  by mouth. Allergies   Allergen Reactions    Rofecoxib Hives and Rash     Other reaction(s): Hives, Hives    Aleve [Naproxen Sodium] Nausea and Vomiting    Motrin [Ibuprofen] Nausea and Vomiting    Percocet [Oxycodone-Acetaminophen] Other (comments)     Other reaction(s): Hallucinations (finding)  hallucinations     Family History   Problem Relation Age of Onset    Stroke Mother 67        Cerebral hemorrhage    Cancer Sister 52        Breast    Breast Cancer Sister 52    Diabetes Father      No current facility-administered medications for this encounter. PHYSICAL EXAM:    /65   Pulse 60   Temp 97.8 °F (36.6 °C)   Resp 16   Ht 5' 10\" (1.778 m)   Wt 72.6 kg (160 lb)   SpO2 100%   Breastfeeding No   BMI 22.96 kg/m²      General: WD, WN. Alert, cooperative, no acute distress    HEENT: NC, Atraumatic. PERRLA, EOMI. Anicteric sclerae. Lungs:  CTA Bilaterally. No Wheezing/Rhonchi/Rales. Heart:  Regular  rhythm,  No murmur, No Rubs, No Gallops  Abdomen: Soft, Non distended, Non tender. +Bowel sounds, no HSM  Extremities: No c/c/e. Upper extremities have slight contractures in hands  Neurologic:  CN 2-12 gi, Alert and oriented X 3. Slight difficulty finding words and completing sentences. Psych:   Good insight. Not anxious nor agitated.        Assessment:   · Average risk screening, Last colonoscopy more than 10 yrs back    Plan:   · Endoscopic procedure: Colonoscopy  · Anesthesia plan: Monitored Anesthesia Care

## 2022-02-01 ENCOUNTER — TELEPHONE (OUTPATIENT)
Dept: INTERNAL MEDICINE CLINIC | Age: 77
End: 2022-02-01

## 2022-02-01 NOTE — TELEPHONE ENCOUNTER
Reason for call:  Please call to discuss a recommendation to a Urologist     Is this a new problem: yes     Date of last appointment:  10/13/2021     Can we respond via Addictivehart: no    Best call back number: Herbert Larose [phone]

## 2022-02-02 NOTE — TELEPHONE ENCOUNTER
Spoke to patient  Ray Fuentes who is on her [de-identified], he states that his wife was referred to [address] [place] [address] For incontinence and that the person who ws working with her is on medical leave, and he does not know when they she will be back.  He states that is is hard for her to hold her urine, and was wondering could she be referred to a Urologist or a gynecologist.

## 2022-02-02 NOTE — TELEPHONE ENCOUNTER
Advised patients  Halie Berumen that Dr. Sara Kee recommends any urogynecologist at [facility] urology. Her  states that he has the number and will give them a call. I will SWITCH the dose or number of times a day I take the medications listed below when I get home from the hospital:    Lantus Solostar Pen 100 units/mL subcutaneous solution  -- 20 unit(s) subcutaneous once a day (at bedtime)   -- Do not drink alcoholic beverages when taking this medication.  It is very important that you take or use this exactly as directed.  Do not skip doses or discontinue unless directed by your doctor.  Keep in refrigerator.  Do not freeze.    HumaLOG 100 units/mL subcutaneous solution  -- 8 unit(s) subcutaneous 3 times a day (before meals)   -- Do not drink alcoholic beverages when taking this medication.  It is very important that you take or use this exactly as directed.  Do not skip doses or discontinue unless directed by your doctor.  Keep in refrigerator.  Do not freeze.

## 2022-03-19 PROBLEM — G93.89: Status: ACTIVE | Noted: 2018-09-06

## 2022-03-19 PROBLEM — I63.9 CEREBROVASCULAR ACCIDENT (CVA) DUE TO THROMBOSIS (HCC): Status: ACTIVE | Noted: 2017-03-10

## 2022-03-19 PROBLEM — F05: Status: ACTIVE | Noted: 2018-09-06

## 2022-03-20 PROBLEM — F32.A MILD DEPRESSION: Status: ACTIVE | Noted: 2019-01-08

## 2022-04-06 NOTE — PROGRESS NOTES
Can I put in referral for ophthalmology?    This is an Initial Medicare Annual Wellness Exam (AWV) (Performed 12 months after IPPE or effective date of Medicare Part B enrollment, Once in a lifetime)    I have reviewed the patient's medical history in detail and updated the computerized patient record. 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 shoulders after fall    HX TUBAL LIGATION  1975    VASCULAR SURGERY PROCEDURE UNLIST  1975    varicose vein stripping Right leg     Current Outpatient Prescriptions   Medication Sig Dispense Refill    levothyroxine (SYNTHROID) 25 mcg tablet TAKE 1 TAB BY MOUTH DAILY (BEFORE BREAKFAST). 90 Tab 1    divalproex DR (DEPAKOTE) 250 mg tablet TAKE 3 TABLETS BY MOUTH EVERY 12 HOURS  2    atorvastatin (LIPITOR) 20 mg tablet Take 0.5 Tabs by mouth nightly. 30 Tab 5    sertraline (ZOLOFT) 100 mg tablet Take 150 mg by mouth daily.  clopidogrel (PLAVIX) 75 mg tablet Take 1 Tab by mouth daily. 30 Tab 1    fish oil-omega-3 fatty acids 340-1,000 mg capsule Take 1 Cap by mouth daily.  Calcium Carbonate-Vit D3-Min (CALTRATE 600+D PLUS MINERALS) 600 mg calcium- 400 unit tab Take 1 Tab by mouth daily.        Allergies   Allergen Reactions    Rofecoxib Hives and Rash     Other reaction(s): Hives, Hives    Aleve [Naproxen Sodium] Nausea and Vomiting    Motrin [Ibuprofen] Nausea and Vomiting    Percocet [Oxycodone-Acetaminophen] Other (comments)     Other reaction(s): Hallucinations (finding)  hallucinations     Family History   Problem Relation Age of Onset    Stroke Mother 67     Cerebral hemorrhage    Cancer Sister 52 Breast    Breast Cancer Sister 52    Diabetes Father      Social History   Substance Use Topics    Smoking status: Never Smoker    Smokeless tobacco: Never Used    Alcohol use No     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    S/P arthroscopy of shoulder Z98.890    Colon cancer screening Z12.11    History of screening mammography Z92.89    Pap smear for cervical cancer screening Z12.4    S/P left cataract extraction Z98.42    Vitamin D deficiency E55.9    CVA (cerebral infarction) I63.9    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    Pseudobulbar affect F48.2    Right arm weakness R29.898    Seizure ([facility] R56.9    Toxoplasma chorioretinitis, 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    TIA (transient ischemic attack) G45.9    Depression F32.9    Cerebrovascular accident (CVA) due to thrombosis ([facility] I63.9       Depression Risk Factor Screening:     PHQ over the last two weeks 6/13/2016   Little interest or pleasure in doing things Not at all   Feeling down, depressed or hopeless Not at all   Total Score PHQ 2 0   Trouble falling or staying asleep, or sleeping too much -   Feeling tired or having little energy -   Poor appetite or overeating -   Feeling bad about yourself - or that you are a failure or have let yourself or your family down -   Trouble concentrating on things such as school, work, reading or watching TV -   Moving or speaking so slowly that other people could have noticed; or the opposite being so fidgety that others notice -   Thoughts of being better off dead, or hurting yourself in some way -     Alcohol Risk Factor Screening: You do not drink alcohol or very rarely. Functional Ability and Level of Safety:     Hearing Loss  Hearing is good.     Activities of Daily Living  The home contains: no safety equipment  Patient needs help with:  transportation, preparing meals, housework, managing medications and managing money  As a result of recent stroke in February 2017  Fall Risk  Fall Risk Assessment, last 12 mths 9/11/2017   Able to walk? Yes   Fall in past 12 months? No       Abuse Screen  Patient is not abused per     Cognitive Screening   Evaluation of Cognitive Function:  Has your family/caregiver stated any concerns about your memory: yes  Abnormal    Patient Care Team   Patient Care Team:  Meagan Nguyen MD as PCP - General (Internal Medicine)  Umesh Kessler MD (Ophthalmology)    Assessment/Plan   Education and counseling provided:  Are appropriate based on today's review and evaluation  Influenza Vaccine    Diagnoses and all orders for this visit:    1.  Initial Medicare annual wellness visit     patient's  declines vaccines    Health Maintenance Due   Topic Date Due    ZOSTER VACCINE AGE 60>  08/07/2007    GLAUCOMA SCREENING Q2Y  10/07/2016

## 2022-06-14 ENCOUNTER — OFFICE VISIT (OUTPATIENT)
Dept: NEUROLOGY | Age: 77
End: 2022-06-14
Payer: MEDICARE

## 2022-06-14 VITALS
BODY MASS INDEX: 22.9 KG/M2 | HEART RATE: 74 BPM | OXYGEN SATURATION: 98 % | SYSTOLIC BLOOD PRESSURE: 118 MMHG | WEIGHT: 160 LBS | DIASTOLIC BLOOD PRESSURE: 84 MMHG | HEIGHT: 70 IN

## 2022-06-14 DIAGNOSIS — G40.209 PARTIAL SYMPTOMATIC EPILEPSY WITH COMPLEX PARTIAL SEIZURES, NOT INTRACTABLE, WITHOUT STATUS EPILEPTICUS (HCC): Primary | ICD-10-CM

## 2022-06-14 DIAGNOSIS — R25.2 SPASTICITY AS LATE EFFECT OF CEREBROVASCULAR ACCIDENT (CVA): ICD-10-CM

## 2022-06-14 DIAGNOSIS — Z79.899 LONG-TERM CURRENT USE OF ANTICONVULSANT: ICD-10-CM

## 2022-06-14 DIAGNOSIS — I69.398 SPASTICITY AS LATE EFFECT OF CEREBROVASCULAR ACCIDENT (CVA): ICD-10-CM

## 2022-06-14 PROCEDURE — G8427 DOCREV CUR MEDS BY ELIG CLIN: HCPCS | Performed by: PSYCHIATRY & NEUROLOGY

## 2022-06-14 PROCEDURE — G8420 CALC BMI NORM PARAMETERS: HCPCS | Performed by: PSYCHIATRY & NEUROLOGY

## 2022-06-14 PROCEDURE — 99214 OFFICE O/P EST MOD 30 MIN: CPT | Performed by: PSYCHIATRY & NEUROLOGY

## 2022-06-14 PROCEDURE — 1123F ACP DISCUSS/DSCN MKR DOCD: CPT | Performed by: PSYCHIATRY & NEUROLOGY

## 2022-06-14 PROCEDURE — G9899 SCRN MAM PERF RSLTS DOC: HCPCS | Performed by: PSYCHIATRY & NEUROLOGY

## 2022-06-14 PROCEDURE — G9717 DOC PT DX DEP/BP F/U NT REQ: HCPCS | Performed by: PSYCHIATRY & NEUROLOGY

## 2022-06-14 PROCEDURE — 1090F PRES/ABSN URINE INCON ASSESS: CPT | Performed by: PSYCHIATRY & NEUROLOGY

## 2022-06-14 PROCEDURE — G8400 PT W/DXA NO RESULTS DOC: HCPCS | Performed by: PSYCHIATRY & NEUROLOGY

## 2022-06-14 PROCEDURE — G8536 NO DOC ELDER MAL SCRN: HCPCS | Performed by: PSYCHIATRY & NEUROLOGY

## 2022-06-14 PROCEDURE — 3017F COLORECTAL CA SCREEN DOC REV: CPT | Performed by: PSYCHIATRY & NEUROLOGY

## 2022-06-14 PROCEDURE — 1101F PT FALLS ASSESS-DOCD LE1/YR: CPT | Performed by: PSYCHIATRY & NEUROLOGY

## 2022-06-14 RX ORDER — DIVALPROEX SODIUM 500 MG/1
500 TABLET, DELAYED RELEASE ORAL 2 TIMES DAILY
Qty: 180 TABLET | Refills: 3 | Status: SHIPPED | OUTPATIENT
Start: 2022-06-14

## 2022-06-14 NOTE — PROGRESS NOTES
Chief Complaint   Patient presents with    Follow-up     Epilepsy       HPI      56-year-old woman here to follow-up. 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 She has post stroke symptomatic epilepsy. Since I saw her last she has been doing very well. Her  is present. No seizures. She continues to be very motivated to improve her language but oftentimes will be struggling with the reality that she will not improve back to her baseline. Since I saw her last she continues to do her own speech therapy using iPad applications. She still uses a splint on the right hand as needed. She is in good spirits. She is on Depakote and Plavix and sertraline.     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     Family History   Problem Relation Age of Onset    Stroke Mother 67        Cerebral hemorrhage    Cancer Sister 52        Breast    Breast Cancer Sister 52    Diabetes Father      Social History     Socioeconomic History    Marital status:      Spouse name: Not on file    Number of children: Not on file    Years of education: Not on file    Highest education level: Not on file   Occupational History    Not on file   Tobacco Use    Smoking status: Never Smoker    Smokeless tobacco: Never Used   Vaping Use    Vaping Use: Never used   Substance and Sexual Activity    Alcohol use: No     Alcohol/week: 0.0 standard drinks    Drug use: No     Types: OTC    Sexual activity: Not Currently     Partners: Male   Other Topics Concern    Not on file   Social History Narrative    Not on file     Social Determinants of Health     Financial Resource Strain:     Difficulty of Paying Living Expenses: Not on file   Food Insecurity:     Worried About [facility] in the Last Year: Not on file    Edie of Food in the Last Year: Not on file   Transportation Needs:     Lack of Transportation (Medical): Not on file    Lack of Transportation (Non-Medical): Not on file   Physical Activity:     Days of Exercise per Week: Not on file    Minutes of Exercise per Session: Not on file   Stress:     Feeling of Stress : Not on file   Social Connections:     Frequency of Communication with Friends and Family: Not on file    Frequency of Social Gatherings with Friends and Family: Not on file    Attends [employer] Services: Not on file    Active Member of [address] [place] [address] or Organizations: Not on file    Attends Club or Organization Meetings: Not on file    Marital Status: Not on file   Intimate Partner Violence:     Fear of Current or Ex-Partner: Not on file    Emotionally Abused: Not on file    Physically Abused: Not on file    Sexually Abused: Not on file   Housing Stability:     Unable to Pay for Housing in the Last Year: Not on file    Number of [place] in the Last Year: Not on file    Unstable Housing in the Last Year: Not on file     Allergies   Allergen Reactions    Rofecoxib Hives and Rash     Other reaction(s): Hives, Hives    Aleve [Naproxen Sodium] Nausea and Vomiting    Motrin [Ibuprofen] Nausea and Vomiting    Percocet [Oxycodone-Acetaminophen] Other (comments)     Other reaction(s): Hallucinations (finding)  hallucinations         Current Outpatient Medications   Medication Sig    divalproex DR (DEPAKOTE) 500 mg tablet Take 1 Tablet by mouth two (2) times a day.     levothyroxine (SYNTHROID) 50 mcg tablet TAKE 1 TABLET BY MOUTH EVERY DAY BEFORE BREAKFAST    atorvastatin (LIPITOR) 20 mg tablet TAKE 1 TABLET BY MOUTH EVERY DAY    sertraline (ZOLOFT) 100 mg tablet TAKE 2 TABLETS BY MOUTH EVERY DAY    clopidogreL (PLAVIX) 75 mg tab TAKE 1 TABLET BY MOUTH EVERY DAY    Lotemax 0.5 % drpg PLACE 1 DROP INTO BOTH EYES ONCE OR TWICE A DAY AS NEEDED    OTHER,NON-FORMULARY, Diagnosis: right upper extremity weakness,hand spasm,stiffness from previous stroke      Occupation therapy: please evaluate and treat.  mv-min/iron/folic/calcium/vitK (WOMEN'S MULTIVITAMIN PO) Take  by mouth.  fish oil-omega-3 fatty acids 340-1,000 mg capsule Take 1 Capsule by mouth daily.  Calcium Carbonate-Vit D3-Min (CALTRATE 600+D PLUS MINERALS) 600 mg calcium- 400 unit tab Take 1 Tab by mouth daily. No current facility-administered medications for this visit. Neurologic Exam     Mental Status   WD/WN adult in NAD, normal grooming  VSS  A&O x 3 smiling today and very talkative    PERRL, nonicteric  Face is symmetric, tongue midline  Speech is nonfluent  No limb ataxia. No abnl movements. Right distal hand flaccid with increased tone, non-contracted  Moving all extemities spontaneously and symmetric  Normal gait             Visit Vitals  /84 (BP 1 Location: Left upper arm, BP Patient Position: Sitting)   Pulse 74   Ht 5' 10\" (1.778 m)   Wt 160 lb (72.6 kg)   SpO2 98%   BMI 22.96 kg/m²       Assessment and Plan   Diagnoses and all orders for this visit:    1. Partial symptomatic epilepsy with complex partial seizures, not intractable, without status epilepticus (HCC)  -     divalproex DR (DEPAKOTE) 500 mg tablet; Take 1 Tablet by mouth two (2) times a day. 2. 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 Long-term current use of anticonvulsant  -     divalproex DR (DEPAKOTE) 500 mg tablet; Take 1 Tablet by mouth two (2) times a day. 61-year-old woman who has right hemiparesis and aphasia secondary to stroke with subsequent epilepsy. Overall she is doing very well clinically. No breakthrough seizures. Continue to see OT and speech as appropriate. No medication changes today. She is doing very well. I will see her annually.    30 minutes of time was spent total reviewing the medical record to include a significant portion of the time face-to-face with her and her , completion of documentation. I reviewed and decided to continue the current medications. This clinical note was dictated with an electronic dictation software that can make unintentional errors. If there are any questions, please contact me directly for clarification.       [address] [place] [address], [facility] [address]  Diplomate ALEXANDRAN

## 2022-06-14 NOTE — PROGRESS NOTES
Chief Complaint   Patient presents with    Follow-up     Epilepsy     Visit Vitals  /84 (BP 1 Location: Left upper arm, BP Patient Position: Sitting)   Pulse 74   Ht 5' 10\" (1.778 m)   Wt 160 lb (72.6 kg)   SpO2 98%   BMI 22.96 kg/m²

## 2022-07-11 ENCOUNTER — TELEPHONE (OUTPATIENT)
Dept: INTERNAL MEDICINE CLINIC | Age: 77
End: 2022-07-11

## 2022-07-11 NOTE — TELEPHONE ENCOUNTER
----- Message from Nadine Villavicencio sent at 7/11/2022  9:39 AM EDT -----  Subject: Message to Provider    QUESTIONS  Information for Provider? Soy Bender has been diagnosed with Covid-19 on 7/4   and is still struggling with a persistent cough (mainly at night). Soy Bender   is asking is there anything else she can do to help with the cough. She   has been using cough drops which have helped but not at night. Please   reached ASAP   ---------------------------------------------------------------------------  --------------  Adair WIN  [phone]; OK to leave message on voicemail  ---------------------------------------------------------------------------  --------------  SCRIPT ANSWERS  Relationship to Patient? Other  Representative Name? Alesha Mckeon  Is the Representative on the appropriate HIPAA document in Epic?  Yes

## 2022-07-12 ENCOUNTER — TELEPHONE (OUTPATIENT)
Dept: INTERNAL MEDICINE CLINIC | Age: 77
End: 2022-07-12

## 2022-07-12 NOTE — TELEPHONE ENCOUNTER
Reason for call: Called pt to schedule virtual appt, pt spouse Jayden Patino answered. He would like her to be seen in the office. She was positive for COVID 7/4, I am unsure if still positive. Pt is saying she is doing much better, however, she is having troubles with both of her lungs. She said she is using Vicks on her chest and hands and it has been helping, but she is still having trouble. She said her cough has subsided tremendously, and is doing better. But, her spouse would like her to be seen in the office rather than virtually.     Is this a new problem: yes     Date of last appointment:  10/13/2021     Can we respond via Rapportive: no    Best call back number:

## 2022-07-13 NOTE — TELEPHONE ENCOUNTER
Pt does not need appt--just wanted to send a message informing Dr. Yloanda Thomason, pt is doing way better they do not need appt. Says they will retest on Saturday.

## 2022-07-13 NOTE — TELEPHONE ENCOUNTER
Pt does not need appt--just wanted to send a message informing Dr. Laura Rosa, pt is doing way better they do not need appt. Says they will retest on Saturday.

## 2022-07-18 ENCOUNTER — TRANSCRIBE ORDER (OUTPATIENT)
Dept: SCHEDULING | Age: 77
End: 2022-07-18

## 2022-07-18 DIAGNOSIS — Z12.31 SCREENING MAMMOGRAM FOR HIGH-RISK PATIENT: Primary | ICD-10-CM

## 2022-08-10 RX ORDER — ATORVASTATIN CALCIUM 20 MG/1
TABLET, FILM COATED ORAL
Qty: 90 TABLET | Refills: 1 | Status: SHIPPED | OUTPATIENT
Start: 2022-08-10

## 2022-08-10 RX ORDER — ATORVASTATIN CALCIUM 20 MG/1
20 TABLET, FILM COATED ORAL DAILY
Qty: 90 TABLET | Refills: 1 | OUTPATIENT
Start: 2022-08-10

## 2022-08-10 NOTE — TELEPHONE ENCOUNTER
Reason for call:  Spoke with pt . He was calling in regard a refill. Pt only have 4 days left.     Is this a new problem: yes     Date of last appointment:  10/13/2021     Can we respond via F?rsat Bu F?rsat: no    Best call back number:   Wong Carr ([facility]) [phone] (Mobile)       [id number]  [phone]

## 2022-08-12 ENCOUNTER — HOSPITAL ENCOUNTER (OUTPATIENT)
Dept: MAMMOGRAPHY | Age: 77
Discharge: HOME | End: 2022-08-12
Attending: INTERNAL MEDICINE
Payer: MEDICARE

## 2022-08-12 DIAGNOSIS — Z12.31 SCREENING MAMMOGRAM FOR HIGH-RISK PATIENT: ICD-10-CM

## 2022-08-12 PROCEDURE — 77067 SCR MAMMO BI INCL CAD: CPT

## 2022-08-18 ENCOUNTER — OFFICE VISIT (OUTPATIENT)
Dept: INTERNAL MEDICINE CLINIC | Age: 77
End: 2022-08-18
Payer: MEDICARE

## 2022-08-18 VITALS
SYSTOLIC BLOOD PRESSURE: 95 MMHG | RESPIRATION RATE: 16 BRPM | OXYGEN SATURATION: 96 % | TEMPERATURE: 96.8 F | WEIGHT: 150 LBS | HEIGHT: 70 IN | BODY MASS INDEX: 21.47 KG/M2 | DIASTOLIC BLOOD PRESSURE: 59 MMHG | HEART RATE: 65 BPM

## 2022-08-18 DIAGNOSIS — E78.2 MIXED HYPERLIPIDEMIA: ICD-10-CM

## 2022-08-18 DIAGNOSIS — E03.9 HYPOTHYROIDISM, ADULT: ICD-10-CM

## 2022-08-18 DIAGNOSIS — M24.541 CONTRACTURE, RIGHT HAND: ICD-10-CM

## 2022-08-18 DIAGNOSIS — Z13.31 SCREENING FOR DEPRESSION: ICD-10-CM

## 2022-08-18 DIAGNOSIS — Z79.899 ENCOUNTER FOR LONG-TERM (CURRENT) USE OF MEDICATIONS: ICD-10-CM

## 2022-08-18 DIAGNOSIS — Z00.00 MEDICARE ANNUAL WELLNESS VISIT, SUBSEQUENT: ICD-10-CM

## 2022-08-18 DIAGNOSIS — F32.A DEPRESSION, UNSPECIFIED DEPRESSION TYPE: Primary | ICD-10-CM

## 2022-08-18 DIAGNOSIS — R73.01 IFG (IMPAIRED FASTING GLUCOSE): ICD-10-CM

## 2022-08-18 DIAGNOSIS — N18.30 STAGE 3 CHRONIC KIDNEY DISEASE, UNSPECIFIED WHETHER STAGE 3A OR 3B CKD (HCC): ICD-10-CM

## 2022-08-18 DIAGNOSIS — I69.322 CVA, OLD, DYSARTHRIA: ICD-10-CM

## 2022-08-18 PROCEDURE — 3017F COLORECTAL CA SCREEN DOC REV: CPT | Performed by: INTERNAL MEDICINE

## 2022-08-18 PROCEDURE — G8420 CALC BMI NORM PARAMETERS: HCPCS | Performed by: INTERNAL MEDICINE

## 2022-08-18 PROCEDURE — G8427 DOCREV CUR MEDS BY ELIG CLIN: HCPCS | Performed by: INTERNAL MEDICINE

## 2022-08-18 PROCEDURE — 1090F PRES/ABSN URINE INCON ASSESS: CPT | Performed by: INTERNAL MEDICINE

## 2022-08-18 PROCEDURE — 99212 OFFICE O/P EST SF 10 MIN: CPT | Performed by: INTERNAL MEDICINE

## 2022-08-18 PROCEDURE — G0463 HOSPITAL OUTPT CLINIC VISIT: HCPCS | Performed by: INTERNAL MEDICINE

## 2022-08-18 PROCEDURE — G9899 SCRN MAM PERF RSLTS DOC: HCPCS | Performed by: INTERNAL MEDICINE

## 2022-08-18 PROCEDURE — 1101F PT FALLS ASSESS-DOCD LE1/YR: CPT | Performed by: INTERNAL MEDICINE

## 2022-08-18 PROCEDURE — G0439 PPPS, SUBSEQ VISIT: HCPCS | Performed by: INTERNAL MEDICINE

## 2022-08-18 PROCEDURE — G8400 PT W/DXA NO RESULTS DOC: HCPCS | Performed by: INTERNAL MEDICINE

## 2022-08-18 PROCEDURE — G8536 NO DOC ELDER MAL SCRN: HCPCS | Performed by: INTERNAL MEDICINE

## 2022-08-18 PROCEDURE — G9717 DOC PT DX DEP/BP F/U NT REQ: HCPCS | Performed by: INTERNAL MEDICINE

## 2022-08-18 NOTE — PROGRESS NOTES
Assessment/Plan:     1. Depression, unspecified depression type  -mood stable with use of her zoloft 100mg daily.     - DEPRESSION SCREEN ANNUAL  - CBC WITH AUTOMATED DIFF; Future  - METABOLIC PANEL, COMPREHENSIVE; Future    2. Stage 3 chronic kidney disease, unspecified whether stage 3a or 3b CKD ([facility]  -check renal function today. 3. Mixed hyperlipidemia  -using lipitor 20mg daily.  -check lipid panel today    - CBC WITH AUTOMATED DIFF; Future  - METABOLIC PANEL, COMPREHENSIVE; Future  - LIPID PANEL; Future    4. Hypothyroidism, adult  -appears clinically euthyroid  - helping patient with medications. Taking appropriately.     - TSH 3RD GENERATION; Future  - T4, FREE; Future    5. CVA, old, dysarthria  -working with speech periodically  -processing also on level of 1-2 grade. 6. Contracture, right hand  -much improved. Using her brace daily. 7. IFG (impaired fasting glucose)  -maintaining low sugar and carb diet. - METABOLIC PANEL, COMPREHENSIVE; Future  - HEMOGLOBIN A1C WITH EAG; Future    8. Encounter for long-term (current) use of medications      9. Medicare annual wellness visit, subsequent  -completed today.      10. Screening for depression    - DEPRESSION SCREEN ANNUAL     Orders Placed This Encounter    ANNUAL DEPRESSION SCREEN 8-15 MIN    CBC WITH AUTOMATED DIFF     Standing Status:   Future     Number of Occurrences:   1     Standing Expiration Date:   3/63/3124    METABOLIC PANEL, COMPREHENSIVE     Standing Status:   Future     Number of Occurrences:   1     Standing Expiration Date:   8/18/2023    LIPID PANEL     Standing Status:   Future     Number of Occurrences:   1     Standing Expiration Date:   8/18/2023    TSH 3RD GENERATION     Standing Status:   Future     Number of Occurrences:   1     Standing Expiration Date:   8/18/2023    T4, FREE     Standing Status:   Future     Number of Occurrences:   1     Standing Expiration Date:   8/18/2023    HEMOGLOBIN A1C WITH EAG Standing Status:   Future     Number of Occurrences:   1     Standing Expiration Date:   8/18/2023        Follow-up Disposition:     Follow up in 6 months               Subjective:      Palma Dolan is a 78 y.o. female who presents today for her Medicare Wellness Exam and follow up of her depression, hyperlipidemia, hypothyroid  old cva with residual dysarthria and right hand contracture. IFG    Since last visit :  -has last visiti with Dr. Crista Hernández for her history of seizures in June, 2022. No medication adjustments made.     -had colonoscopy done with Dr. Kevon Bowman on 1/26/2022. Tubular adenomas removed.     -had visit recently with Dr. Nick Santiago for urinary incontinence. -right hand contracture has significantly improved with use of brace and PT. She is using her brace daily. -has programs on her ipad that she works with daily. Skill level at this time is approximately first to second grade. Patient Care Team:  -Dr. Crista Hernández, neurology  -Dr. Andrey Gu. Coco- urogyn     Objective: Wt Readings from Last 3 Encounters:   08/18/22 150 lb (68 kg)   06/14/22 160 lb (72.6 kg)   01/26/22 160 lb (72.6 kg)     BP Readings from Last 3 Encounters:   08/18/22 (!) 95/59   06/14/22 118/84   01/26/22 113/81     Visit Vitals  BP (!) 95/59   Pulse 65   Temp 96.8 °F (36 °C)   Resp 16   Ht 5' 10\" (1.778 m)   Wt 150 lb (68 kg)   SpO2 96%   BMI 21.52 kg/m²     General appearance: alert, cooperative, no distress, appears stated age  Head: Normocephalic, without obvious abnormality, atraumatic  Neck: supple, symmetrical, trachea midline, no adenopathy, thyroid: not enlarged, symmetric, no tenderness/mass/nodules, no carotid bruit, and no JVD  Lungs: clear to auscultation bilaterally  Heart: regular rate and rhythm, S1, S2 normal, no murmur, click, rub or gallop  Abdomen: soft, non-tender.  Bowel sounds normal. No masses,  no organomegaly  Extremities: extremities normal, atraumatic, no cyanosis or edema  Pulses: 2+ and symmetric              Disclaimer:  Return if symptoms worsen or fail to improve. Advised patient to call back or return to office if symptoms worsen/change/persist.     Discussed expected course/resolution/complications of diagnosis in detail with patient. Medication risks/benefits/costs/interactions/alternatives discussed with patient. Patient was given an after visit summary which includes diagnoses, current medications, & vitals. Patient expressed understanding with the diagnosis and plan. This is the Subsequent Medicare Annual Wellness Exam, performed 12 months or more after the Initial AWV or the last Subsequent AWV    I have reviewed the patient's medical history in detail and updated the computerized patient record. Assessment/Plan   Education and counseling provided:  Are appropriate based on today's review and evaluation    1. Depression, unspecified depression type  -     DEPRESSION SCREEN ANNUAL  2. Stage 3 chronic kidney disease, unspecified whether stage 3a or 3b CKD ([facility]  3. Mixed hyperlipidemia  4. Hypothyroidism, adult  5. CVA, old, dysarthria  6. Contracture, right hand  7. IFG (impaired fasting glucose)  8. Encounter for long-term (current) use of medications  9. Medicare annual wellness visit, subsequent  10.  Screening for depression  -     DEPRESSION SCREEN ANNUAL       Depression Risk Factor Screening     3 most recent PHQ Screens 8/18/2022   Little interest or pleasure in doing things Not at all   Feeling down, depressed, irritable, or hopeless Not at all   Total Score PHQ 2 0   Trouble falling or staying asleep, or sleeping too much -   Feeling tired or having little energy -   Poor appetite, weight loss, or overeating -   Feeling bad about yourself - or that you are a failure or have let yourself or your family down -   Trouble concentrating on things such as school, work, reading, or watching TV -   Moving or speaking so slowly that other people could have noticed; or the opposite being so fidgety that others notice -   Thoughts of being better off dead, or hurting yourself in some way -       Alcohol & Drug Abuse Risk Screen    Do you average more than 1 drink per night or more than 7 drinks a week:  No    On any one occasion in the past three months have you have had more than 3 drinks containing alcohol:  No          Functional Ability and Level of Safety    Hearing: Hearing is good. Activities of Daily Living: The home contains: handrails  Patient needs help with:  transportation, shopping, preparing meals, managing medications, and managing money      Ambulation: with no difficulty     Fall Risk:  Fall Risk Assessment, last 12 mths 8/18/2022   Able to walk? Yes   Fall in past 12 months? 0   Do you feel unsteady?  0   Are you worried about falling 0      Abuse Screen:  Patient is not abused       Cognitive Screening    Has your family/caregiver stated any concerns about your memory: no- has cognitive deficits with history of CVA         Health Maintenance Due     Health Maintenance Due   Topic Date Due    Shingrix Vaccine Age 49> (2 of 2) 12/06/2019    Lipid Screen  11/30/2021    COVID-19 Vaccine (4 - Booster for Pfizer series) 02/26/2022       Patient Care Team   Patient Care Team:  Kavon Shine MD as PCP - General (Internal Medicine Physician)  Kavon Shine MD as PCP - [facility] Empaneled Provider  Lexus Billingsley MD (Ophthalmology)  Aldo Callahan DO (Neurology)  Yair Wallace MD (Urology)    History     Patient Active Problem List   Diagnosis Code    Menopause Z78.0    History of varicose vein stripping Z98.890    S/P tubal ligation Z98.51    Toxoplasmosis B58.9    S/P arthroscopy of shoulder Z98.890    Colon cancer screening Z12.11    History of screening mammography Z92.89    Pap smear for cervical cancer screening Z12.4    S/P left cataract extraction Z98.42    Vitamin D deficiency E55.9    Cerebral infarction ([address] I63.9    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    Pseudobulbar affect F48.2    Right arm weakness R29.898    Seizure (HCC) R56.9    Toxoplasma chorioretinitis, 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 A    Cerebrovascular accident (CVA) due to thrombosis (HCC) I63.9    Sudden severe confusion due to brain dysfunction caused by illness G93.89, F05    Mild depression F32. 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 shoulders after fall    HX TUBAL LIGATION  1975    VASCULAR SURGERY PROCEDURE UNLIST  1975    varicose vein stripping Right leg     Current Outpatient Medications   Medication Sig Dispense Refill    atorvastatin (LIPITOR) 20 mg tablet TAKE 1 TABLET BY MOUTH EVERY DAY 90 Tablet 1    sertraline (ZOLOFT) 100 mg tablet TAKE 2 TABLETS BY MOUTH EVERY  Tablet 0    clopidogreL (PLAVIX) 75 mg tab TAKE 1 TABLET BY MOUTH EVERY DAY 90 Tablet 0    divalproex DR (DEPAKOTE) 500 mg tablet Take 1 Tablet by mouth two (2) times a day. 180 Tablet 3    levothyroxine (SYNTHROID) 50 mcg tablet TAKE 1 TABLET BY MOUTH EVERY DAY BEFORE BREAKFAST 90 Tablet 0    Lotemax 0.5 % drpg PLACE 1 DROP INTO BOTH EYES ONCE OR TWICE A DAY AS NEEDED      mv-min/iron/folic/calcium/vitK (WOMEN'S MULTIVITAMIN PO) Take  by mouth. Calcium Carbonate-Vit D3-Min 600 mg calcium- 400 unit tab Take 1 Tab by mouth daily.       OTHER,NON-FORMULARY, Diagnosis: right upper extremity weakness,hand spasm,stiffness from previous stroke      Occupation therapy: please evaluate and treat.  1 Each 0     Allergies   Allergen Reactions    Rofecoxib Hives and Rash     Other reaction(s): Hives, Hives    Aleve [Naproxen Sodium] Nausea and Vomiting    Motrin [Ibuprofen] Nausea and Vomiting    Percocet [Oxycodone-Acetaminophen] Other (comments)     Other reaction(s): Hallucinations (finding)  hallucinations       Family History   Problem Relation Age of Onset    Stroke Mother 67        Cerebral hemorrhage    Cancer Sister 52        Breast    Breast Cancer Sister 52    Diabetes Father      Social History     Tobacco Use    Smoking status: Never    Smokeless tobacco: Never   Substance Use Topics    Alcohol use: No     Alcohol/week: 0.0 standard drinks         Henny Munoz MD

## 2022-08-18 NOTE — PROGRESS NOTES
Verified name and birth date for privacy precautions. Chart reviewed in preparation for today's visit.      Chief Complaint   Patient presents with    Thyroid Problem          Health Maintenance Due   Topic    Shingrix Vaccine Age 49> (2 of 2)    Lipid Screen     COVID-19 Vaccine (4 - Booster for Pfizer series)    Medicare Yearly Exam          Wt Readings from Last 3 Encounters:   08/18/22 150 lb (68 kg)   06/14/22 160 lb (72.6 kg)   01/26/22 160 lb (72.6 kg)     Temp Readings from Last 3 Encounters:   08/18/22 96.8 °F (36 °C)   01/26/22 98.2 °F (36.8 °C)   10/13/21 97.2 °F (36.2 °C) (Temporal)     BP Readings from Last 3 Encounters:   08/18/22 (!) 95/59   06/14/22 118/84   01/26/22 113/81     Pulse Readings from Last 3 Encounters:   08/18/22 65   06/14/22 74   01/26/22 (!) 56         Learning Assessment:  :     Learning Assessment 1/7/2019 7/6/2018 8/4/2015 4/7/2014   PRIMARY LEARNER Patient Patient Patient Patient   HIGHEST LEVEL OF EDUCATION - PRIMARY LEARNER  - - > 4 YEARS OF COLLEGE > 4 YEARS OF COLLEGE   BARRIERS PRIMARY LEARNER COGNITIVE COGNITIVE COGNITIVE NONE   CO-LEARNER CAREGIVER Yes Yes Yes No   CO-LEARNER NAME   red billingsley -   [address] - - > 4 [address] - - ENGLISH -    NEED - - No No   LEARNER PREFERENCE PRIMARY DEMONSTRATION DEMONSTRATION LISTENING OTHER (COMMENT)   LEARNER PREFERENCE CO-LEARNER - - OTHER (COMMENT) -   LEARNING SPECIAL TOPICS - - no no   ANSWERED BY patient patient patient patient   RELATIONSHIP SELF SELF SELF SELF       Depression Screening:  :     3 most recent PHQ Screens 8/18/2022   Little interest or pleasure in doing things Not at all   Feeling down, depressed, irritable, or hopeless Not at all   Total Score PHQ 2 0   Trouble falling or staying asleep, or sleeping too much -   Feeling tired or having little energy -   Poor appetite, weight loss, or overeating -   Feeling bad about yourself - or that you are a failure or have let yourself or your family down -   Trouble concentrating on things such as school, work, reading, or watching TV -   Moving or speaking so slowly that other people could have noticed; or the opposite being so fidgety that others notice -   Thoughts of being better off dead, or hurting yourself in some way -       Fall Risk Assessment:  :     Fall Risk Assessment, last 12 mths 8/18/2022   Able to walk? Yes   Fall in past 12 months? 0   Do you feel unsteady? 0   Are you worried about falling 0       Abuse Screening:  :     Abuse Screening Questionnaire 8/18/2022 1/7/2019 7/6/2018 9/23/2015 8/4/2015 4/7/2014   Do you ever feel afraid of your partner? N N N (No Data) (No Data) N   Are you in a relationship with someone who physically or mentally threatens you? N N N (No Data) (No Data) N   Is it safe for you to go home?  Y Y Y (No Data) (No Data) Y

## 2022-08-18 NOTE — PATIENT INSTRUCTIONS
Medicare Wellness Visit, Female     The best way to live healthy is to have a lifestyle where you eat a well-balanced diet, exercise regularly, limit alcohol use, and quit all forms of tobacco/nicotine, if applicable. Regular preventive services are another way to keep healthy. Preventive services (vaccines, screening tests, monitoring & exams) can help personalize your care plan, which helps you manage your own care. Screening tests can find health problems at the earliest stages, when they are easiest to treat. [facility] follows the current, evidence-based guidelines published by the [facility] ([facility]) when recommending preventive services for our patients. Because we follow these guidelines, sometimes recommendations change over time as research supports it. (For example, mammograms used to be recommended annually. Even though Medicare will still pay for an annual mammogram, the newer guidelines recommend a mammogram every two years for women of average risk). Of course, you and your doctor may decide to screen more often for some diseases, based on your risk and your co-morbidities (chronic disease you are already diagnosed with). Preventive services for you include:  - Medicare offers their members a free annual wellness visit, which is time for you and your primary care provider to discuss and plan for your preventive service needs. Take advantage of this benefit every year!  -All adults over the age of 72 should receive the recommended pneumonia vaccines. Current USPSTF guidelines recommend a series of two vaccines for the best pneumonia protection.   -All adults should have a flu vaccine yearly and a tetanus vaccine every 10 years.   -All adults age 48 and older should receive the shingles vaccines (series of two vaccines).       -All adults age 38-68 who are overweight should have a diabetes screening test once every three years.   -All adults born between [birth year] and 1965 should be screened once for Hepatitis C.  -Other screening tests and preventive services for persons with diabetes include: an eye exam to screen for diabetic retinopathy, a kidney function test, a foot exam, and stricter control over your cholesterol.   -Cardiovascular screening for adults with routine risk involves an electrocardiogram (ECG) at intervals determined by your doctor.   -Colorectal cancer screenings should be done for adults age 54-65 with no increased risk factors for colorectal cancer. There are a number of acceptable methods of screening for this type of cancer. Each test has its own benefits and drawbacks. Discuss with your doctor what is most appropriate for you during your annual wellness visit. The different tests include: colonoscopy (considered the best screening method), a fecal occult blood test, a fecal DNA test, and sigmoidoscopy.    -A bone mass density test is recommended when a woman turns 65 to screen for osteoporosis. This test is only recommended one time, as a screening. Some providers will use this same test as a disease monitoring tool if you already have osteoporosis. -Breast cancer screenings are recommended every other year for women of normal risk, age 54-69.  -Cervical cancer screenings for women over age 72 are only recommended with certain risk factors.

## 2022-09-19 ENCOUNTER — TELEPHONE (OUTPATIENT)
Dept: INTERNAL MEDICINE CLINIC | Age: 77
End: 2022-09-19

## 2022-09-19 DIAGNOSIS — M24.541 CONTRACTURE, RIGHT HAND: ICD-10-CM

## 2022-09-19 DIAGNOSIS — I69.322 CVA, OLD, DYSARTHRIA: Primary | ICD-10-CM

## 2022-09-19 NOTE — TELEPHONE ENCOUNTER
Reason for call:  Pt needs a referral to resume her occupational and speech therapy at [facility].   Lorrie Nielsen [phone]    Is this a new problem: yes     Date of last appointment:  8/18/2022     Can we respond via Zazubat: no    Best call back number:   Wong Carr () [phone] (Mobile)

## 2022-09-29 ENCOUNTER — TELEPHONE (OUTPATIENT)
Dept: INTERNAL MEDICINE CLINIC | Age: 77
End: 2022-09-29

## 2022-09-29 NOTE — TELEPHONE ENCOUNTER
Reason for call:   Please call to discuss lab results and her kidney function. Could this be medication?      Is this a new problem: yes     Date of last appointment:  8/18/2022     Can we respond via Volleyhart: no    Best call back number:  Moses Esquivel her  [phone]

## 2022-09-29 NOTE — TELEPHONE ENCOUNTER
Attempted to contact patient's  without success.  Left voicemail message requesting a call back to the office

## 2022-09-30 ENCOUNTER — TELEPHONE (OUTPATIENT)
Dept: INTERNAL MEDICINE CLINIC | Age: 77
End: 2022-09-30

## 2022-09-30 NOTE — TELEPHONE ENCOUNTER
Reason for call:  orders were faxed for a progressive hand attachment for the left hand and some replacement equipment and most recent clinical office note.  Dannie Joel [phone]    Is this a new problem: yes     Date of last appointment:  8/18/2022     Can we respond via LiquidFrameworkst: no    Best call back number:    darnell Rausch [phone]

## 2023-02-14 ENCOUNTER — OFFICE VISIT (OUTPATIENT)
Dept: INTERNAL MEDICINE CLINIC | Age: 78
End: 2023-02-14
Payer: MEDICARE

## 2023-02-14 VITALS
SYSTOLIC BLOOD PRESSURE: 99 MMHG | HEIGHT: 70 IN | TEMPERATURE: 97.6 F | BODY MASS INDEX: 22.19 KG/M2 | WEIGHT: 155 LBS | DIASTOLIC BLOOD PRESSURE: 64 MMHG | HEART RATE: 90 BPM | RESPIRATION RATE: 16 BRPM | OXYGEN SATURATION: 96 %

## 2023-02-14 DIAGNOSIS — N18.30 STAGE 3 CHRONIC KIDNEY DISEASE, UNSPECIFIED WHETHER STAGE 3A OR 3B CKD (HCC): ICD-10-CM

## 2023-02-14 DIAGNOSIS — E78.2 MIXED HYPERLIPIDEMIA: ICD-10-CM

## 2023-02-14 DIAGNOSIS — F32.A DEPRESSION, UNSPECIFIED DEPRESSION TYPE: Primary | ICD-10-CM

## 2023-02-14 DIAGNOSIS — G40.209 PARTIAL SYMPTOMATIC EPILEPSY WITH COMPLEX PARTIAL SEIZURES, NOT INTRACTABLE, WITHOUT STATUS EPILEPTICUS (HCC): ICD-10-CM

## 2023-02-14 DIAGNOSIS — R73.01 IFG (IMPAIRED FASTING GLUCOSE): ICD-10-CM

## 2023-02-14 DIAGNOSIS — Z79.899 ENCOUNTER FOR LONG-TERM (CURRENT) USE OF MEDICATIONS: ICD-10-CM

## 2023-02-14 DIAGNOSIS — E03.9 HYPOTHYROIDISM, ADULT: ICD-10-CM

## 2023-02-14 PROBLEM — F32.0 MAJOR DEPRESSIVE DISORDER, SINGLE EPISODE, MILD (HCC): Status: ACTIVE | Noted: 2023-02-14

## 2023-02-14 PROCEDURE — G0463 HOSPITAL OUTPT CLINIC VISIT: HCPCS | Performed by: INTERNAL MEDICINE

## 2023-02-14 NOTE — PROGRESS NOTES
Assessment/Plan:     1. Depression, unspecified depression type  -stable with use of zoloft 200mg daily.    - CBC WITH AUTOMATED DIFF; Future  - METABOLIC PANEL, COMPREHENSIVE; Future  - METABOLIC PANEL, COMPREHENSIVE  - CBC WITH AUTOMATED DIFF      2. Partial symptomatic epilepsy with complex partial seizures, not intractable, without status epilepticus [facility])  -establishing with new neurologist, Dr. Keara Villalobos in July, 2023. Previous neurologist leaving practice.   -taking depakote 500mg bid    3. Stage 3 chronic kidney disease, unspecified whether stage 3a or 3b CKD ([facility] [address]  -labs today      4. Hypothyroidism, adult  -appears clinically euthyroid. - TSH 3RD GENERATION; Future  - T4, FREE; Future  - T4, FREE  - TSH 3RD GENERATION    5. IFG (impaired fasting glucose)  -maintaining low sugar and carbs in diet. - METABOLIC PANEL, COMPREHENSIVE; Future  - HEMOGLOBIN A1C WITH EAG; Future  - HEMOGLOBIN A1C WITH EAG  - METABOLIC PANEL, COMPREHENSIVE    6. Mixed hyperlipidemia  -taking atorvastatin 20mg daily. 7. Encounter for long-term (current) use of medications    8. History of CVA  -anticoagulated with plavix 75mg daily.      Orders Placed This Encounter    CBC WITH AUTOMATED DIFF     Standing Status:   Future     Number of Occurrences:   1     Standing Expiration Date:   6/05/9859    METABOLIC PANEL, COMPREHENSIVE     Standing Status:   Future     Number of Occurrences:   1     Standing Expiration Date:   2/14/2024    TSH 3RD GENERATION     Standing Status:   Future     Number of Occurrences:   1     Standing Expiration Date:   2/14/2024    T4, FREE     Standing Status:   Future     Number of Occurrences:   1     Standing Expiration Date:   2/14/2024    HEMOGLOBIN A1C WITH EAG     Standing Status:   Future     Number of Occurrences:   1     Standing Expiration Date:   2/14/2024             Follow-up Disposition:       Follow up in 6 months             Subjective:      Hannah Begum is a 78 y.o. female who presents today for follow up of her history of CVA with dyarthria and hand contracture, IFG, depression, impaired renal function and hypothyroid. Here today with her , Nicolas Guidry.     Since last visit :  -physical therapy has made her a new brace for the contracture of her right hand. She has exercises for her hand that she does daily. Had speech therapy as well. The contracture of her hand has made good progress with her PT and exercise.     -no new concerns.    -dysarthria still present.   -she is diligent in working on her word exercises and relearning vocabulary. Patient Care Team:  -Dr. Madelyn Ness, neurology  -Dr. Jesus Pryor- urogy         Objective: Wt Readings from Last 3 Encounters:   08/18/22 150 lb (68 kg)   06/14/22 160 lb (72.6 kg)   01/26/22 160 lb (72.6 kg)     BP Readings from Last 3 Encounters:   08/18/22 (!) 95/59   06/14/22 118/84   01/26/22 113/81     Visit Vitals  BP 99/64   Pulse 90   Temp 97.6 °F (36.4 °C) (Temporal)   Resp 16   Ht 5' 10\" (1.778 m)   Wt 155 lb (70.3 kg)   SpO2 96%   BMI 22.24 kg/m²     General appearance: alert, cooperative, no distress, appears stated age  Head: Normocephalic, without obvious abnormality, atraumatic  Neck: supple, symmetrical, trachea midline, no adenopathy, thyroid: not enlarged, symmetric, no tenderness/mass/nodules, no carotid bruit, and no JVD  Lungs: clear to auscultation bilaterally  Heart: regular rate and rhythm, S1, S2 normal, no murmur, click, rub or gallop  Abdomen: soft, non-tender. Bowel sounds normal. No masses,  no organomegaly  Extremities: extremities normal, atraumatic, no cyanosis or edema  Pulses: 2+ and symmetric              Disclaimer:  Return if symptoms worsen or fail to improve. Advised patient to call back or return to office if symptoms worsen/change/persist.     Discussed expected course/resolution/complications of diagnosis in detail with patient.    Medication risks/benefits/costs/interactions/alternatives discussed with patient. Patient was given an after visit summary which includes diagnoses, current medications, & vitals. Patient expressed understanding with the diagnosis and plan.

## 2023-02-14 NOTE — PROGRESS NOTES
Verified name and birth date for privacy precautions. Chart reviewed in preparation for today's visit.      Chief Complaint   Patient presents with    Thyroid Problem          Health Maintenance Due   Topic    Shingles Vaccine (2 of 2)         Wt Readings from Last 3 Encounters:   02/14/23 155 lb (70.3 kg)   08/18/22 150 lb (68 kg)   06/14/22 160 lb (72.6 kg)     Temp Readings from Last 3 Encounters:   02/14/23 97.6 °F (36.4 °C) (Temporal)   08/18/22 96.8 °F (36 °C)   01/26/22 98.2 °F (36.8 °C)     BP Readings from Last 3 Encounters:   02/14/23 99/64   08/18/22 (!) 95/59   06/14/22 118/84     Pulse Readings from Last 3 Encounters:   02/14/23 90   08/18/22 65   06/14/22 74         Learning Assessment:  :     Learning Assessment 1/7/2019 7/6/2018 8/4/2015 4/7/2014   PRIMARY LEARNER Patient Patient Patient Patient   HIGHEST LEVEL OF EDUCATION - PRIMARY LEARNER  - - > 4 YEARS OF COLLEGE > 4 YEARS OF COLLEGE   BARRIERS PRIMARY LEARNER COGNITIVE COGNITIVE COGNITIVE NONE   CO-LEARNER CAREGIVER Yes Yes Yes No   CO-LEARNER NAME   red Hinton - - > [address] [place] [address] - - No No   LEARNER PREFERENCE PRIMARY DEMONSTRATION DEMONSTRATION LISTENING OTHER (COMMENT)   LEARNER PREFERENCE CO-LEARNER - - OTHER (COMMENT) -   LEARNING SPECIAL TOPICS - - no no   ANSWERED BY patient patient patient patient   RELATIONSHIP SELF SELF SELF SELF       Depression Screening:  :     3 most recent PHQ Screens 2/14/2023   Little interest or pleasure in doing things Not at all   Feeling down, depressed, irritable, or hopeless Not at all   Total Score PHQ 2 0   Trouble falling or staying asleep, or sleeping too much -   Feeling tired or having little energy -   Poor appetite, weight loss, or overeating -   Feeling bad about yourself - or that you are a failure or have let yourself or your family down -   Trouble concentrating on things such as school, work, reading, or watching TV -   Moving or speaking so slowly that other people could have noticed; or the opposite being so fidgety that others notice -   Thoughts of being better off dead, or hurting yourself in some way -       Fall Risk Assessment:  :     Fall Risk Assessment, last 12 mths 2/14/2023   Able to walk? Yes   Fall in past 12 months? 0   Do you feel unsteady? 0   Are you worried about falling 0       Abuse Screening:  :     Abuse Screening Questionnaire 2/14/2023 8/18/2022 1/7/2019 7/6/2018 9/23/2015 8/4/2015 4/7/2014   Do you ever feel afraid of your partner? N N N N (No Data) (No Data) N   Are you in a relationship with someone who physically or mentally threatens you? N N N N (No Data) (No Data) N   Is it safe for you to go home?  Y Y Y Y (No Data) (No Data) Y

## 2023-02-15 LAB
ALBUMIN SERPL-MCNC: 4 G/DL (ref 3.5–5)
ALBUMIN/GLOB SERPL: 1 (ref 1.1–2.2)
ALP SERPL-CCNC: 71 U/L (ref 45–117)
ALT SERPL-CCNC: 16 U/L (ref 12–78)
ANION GAP SERPL CALC-SCNC: 6 MMOL/L (ref 5–15)
AST SERPL-CCNC: 13 U/L (ref 15–37)
BASOPHILS # BLD: 0 K/UL (ref 0–0.1)
BASOPHILS NFR BLD: 1 % (ref 0–1)
BILIRUB SERPL-MCNC: 0.3 MG/DL (ref 0.2–1)
BUN SERPL-MCNC: 24 MG/DL (ref 6–20)
BUN/CREAT SERPL: 21 (ref 12–20)
CALCIUM SERPL-MCNC: 9.8 MG/DL (ref 8.5–10.1)
CHLORIDE SERPL-SCNC: 108 MMOL/L (ref 97–108)
CO2 SERPL-SCNC: 28 MMOL/L (ref 21–32)
CREAT SERPL-MCNC: 1.14 MG/DL (ref 0.55–1.02)
DIFFERENTIAL METHOD BLD: ABNORMAL
EOSINOPHIL # BLD: 0 K/UL (ref 0–0.4)
EOSINOPHIL NFR BLD: 0 % (ref 0–7)
ERYTHROCYTE [DISTWIDTH] IN BLOOD BY AUTOMATED COUNT: 14.6 % (ref 11.5–14.5)
EST. AVERAGE GLUCOSE BLD GHB EST-MCNC: 117 MG/DL
GLOBULIN SER CALC-MCNC: 4 G/DL (ref 2–4)
GLUCOSE SERPL-MCNC: 91 MG/DL (ref 65–100)
HBA1C MFR BLD: 5.7 % (ref 4–5.6)
HCT VFR BLD AUTO: 41 % (ref 35–47)
HGB BLD-MCNC: 13 G/DL (ref 11.5–16)
IMM GRANULOCYTES # BLD AUTO: 0 K/UL (ref 0–0.04)
IMM GRANULOCYTES NFR BLD AUTO: 0 % (ref 0–0.5)
LYMPHOCYTES # BLD: 2.2 K/UL (ref 0.8–3.5)
LYMPHOCYTES NFR BLD: 38 % (ref 12–49)
MCH RBC QN AUTO: 28.1 PG (ref 26–34)
MCHC RBC AUTO-ENTMCNC: 31.7 G/DL (ref 30–36.5)
MCV RBC AUTO: 88.6 FL (ref 80–99)
MONOCYTES # BLD: 0.6 K/UL (ref 0–1)
MONOCYTES NFR BLD: 11 % (ref 5–13)
NEUTS SEG # BLD: 2.9 K/UL (ref 1.8–8)
NEUTS SEG NFR BLD: 50 % (ref 32–75)
NRBC # BLD: 0 K/UL (ref 0–0.01)
NRBC BLD-RTO: 0 PER 100 WBC
PLATELET # BLD AUTO: 125 K/UL (ref 150–400)
POTASSIUM SERPL-SCNC: 4.4 MMOL/L (ref 3.5–5.1)
PROT SERPL-MCNC: 8 G/DL (ref 6.4–8.2)
RBC # BLD AUTO: 4.63 M/UL (ref 3.8–5.2)
SODIUM SERPL-SCNC: 142 MMOL/L (ref 136–145)
T4 FREE SERPL-MCNC: 0.9 NG/DL (ref 0.8–1.5)
TSH SERPL DL<=0.05 MIU/L-ACNC: 3.12 UIU/ML (ref 0.36–3.74)
WBC # BLD AUTO: 5.9 K/UL (ref 3.6–11)

## 2023-02-19 ENCOUNTER — PATIENT MESSAGE (OUTPATIENT)
Dept: INTERNAL MEDICINE CLINIC | Age: 78
End: 2023-02-19

## 2023-02-20 RX ORDER — LEVOTHYROXINE SODIUM 50 UG/1
TABLET ORAL
Qty: 90 TABLET | Refills: 1 | Status: SHIPPED | OUTPATIENT
Start: 2023-02-20

## 2023-02-22 ENCOUNTER — TELEPHONE (OUTPATIENT)
Dept: NEUROLOGY | Age: 78
End: 2023-02-22

## 2023-02-23 ENCOUNTER — TELEPHONE (OUTPATIENT)
Dept: NEUROLOGY | Age: 78
End: 2023-02-23

## 2023-02-23 NOTE — TELEPHONE ENCOUNTER
Patient is returning nurses call to get another option for the prescription DEPAKOTE.     Please contact

## 2023-02-23 NOTE — TELEPHONE ENCOUNTER
Called patient's . He asked if another medication can be given in place of divalproex. Reports his co-pay is too high, stating it is over $100. I attempted to search for a cheaper price on GoodRx but it was around the same price. Patient reports medication works well for her seizures but she can not afford the price.

## 2023-02-24 RX ORDER — CARBAMAZEPINE 200 MG/1
200 TABLET ORAL 3 TIMES DAILY
Qty: 60 TABLET | Refills: 1 | Status: SHIPPED | OUTPATIENT
Start: 2023-02-24

## 2023-02-24 NOTE — TELEPHONE ENCOUNTER
Called pharmacy. Pharmacist stated he pays $105. Stating it is high as of right now due to his deductibles.

## 2023-02-24 NOTE — TELEPHONE ENCOUNTER
Called patient's . Informed him that the doctor stated, \"We can try carbamazepine 200 mg 3 times daily and she can let us know if cost is still an issue. \" I have also found a GoodRx coupon for the patient if they need it.

## 2023-03-23 RX ORDER — CLOPIDOGREL BISULFATE 75 MG/1
TABLET ORAL
Qty: 90 TABLET | Refills: 1 | Status: SHIPPED | OUTPATIENT
Start: 2023-03-23

## 2023-05-08 RX ORDER — ATORVASTATIN CALCIUM 20 MG/1
TABLET, FILM COATED ORAL
Qty: 90 TABLET | Refills: 1 | Status: SHIPPED | OUTPATIENT
Start: 2023-05-08

## 2023-07-24 ENCOUNTER — TRANSCRIBE ORDERS (OUTPATIENT)
Dept: SCHEDULING | Age: 78
End: 2023-07-24

## 2023-07-24 DIAGNOSIS — Z12.31 VISIT FOR SCREENING MAMMOGRAM: Primary | ICD-10-CM

## 2023-07-25 ENCOUNTER — OFFICE VISIT (OUTPATIENT)
Age: 78
End: 2023-07-25

## 2023-07-25 VITALS
WEIGHT: 159 LBS | HEIGHT: 70 IN | HEART RATE: 72 BPM | SYSTOLIC BLOOD PRESSURE: 134 MMHG | BODY MASS INDEX: 22.76 KG/M2 | OXYGEN SATURATION: 96 % | DIASTOLIC BLOOD PRESSURE: 88 MMHG

## 2023-07-25 DIAGNOSIS — G40.209 LOCALIZATION-RELATED (FOCAL) (PARTIAL) SYMPTOMATIC EPILEPSY AND EPILEPTIC SYNDROMES WITH COMPLEX PARTIAL SEIZURES, NOT INTRACTABLE, WITHOUT STATUS EPILEPTICUS (HCC): Primary | ICD-10-CM

## 2023-07-25 DIAGNOSIS — I69.359 CVA, OLD, HEMIPARESIS (HCC): ICD-10-CM

## 2023-07-25 ASSESSMENT — PATIENT HEALTH QUESTIONNAIRE - PHQ9
2. FEELING DOWN, DEPRESSED OR HOPELESS: 0
SUM OF ALL RESPONSES TO PHQ QUESTIONS 1-9: 0
1. LITTLE INTEREST OR PLEASURE IN DOING THINGS: 0
SUM OF ALL RESPONSES TO PHQ9 QUESTIONS 1 & 2: 0
SUM OF ALL RESPONSES TO PHQ QUESTIONS 1-9: 0

## 2023-07-25 NOTE — PROGRESS NOTES
Chief Complaint   Patient presents with    Follow-up     Epilepsy     Vitals:    07/25/23 1405   BP: 134/88   Pulse: 72   SpO2: 96%

## 2023-07-25 NOTE — PROGRESS NOTES
Chief Complaint   Patient presents with    Follow-up     Epilepsy       HISTORY OF PRESENT ILLNESS  Genna Reynolds is a 78 y.o. female who was last seen by Dr. Aly Lozada in June 2022 and is a new patient for me. 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 For stroke was in 2014 and it was suspected to be due to thrombosis of a blood vessel, she received thrombolysis but then ended up having small bleeding complication. She recovered quite well from it and was on seizure prophylaxis. She was taken off of prophylactic medication and ended up having a seizure after which she was put on Depakote. She had another stroke in 2016 that again impacted the same left side of her brain and she had more expressive aphasia and right upper extremity weakness. She has improved but still has difficulty with word finding and struggles with fluency. She has not had any further seizures. 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 NEED OFFICE VISIT FOR FURTHER REFILLS    carBAMazepine (TEGRETOL) 200 MG tablet Take 1 tablet by mouth 3 times daily    clopidogrel (PLAVIX) 75 MG tablet Take 1 tablet by mouth daily    divalproex (DEPAKOTE) 500 MG DR tablet Take 1 tablet by mouth 2 times daily    levothyroxine (SYNTHROID) 50 MCG tablet Take 1 tablet by mouth every morning (before breakfast)    loteprednol (LOTEMAX) 0.5 % ophthalmic gel PLACE 1 DROP

## 2023-08-08 ENCOUNTER — OFFICE VISIT (OUTPATIENT)
Age: 78
End: 2023-08-08
Payer: MEDICARE

## 2023-08-08 VITALS
HEIGHT: 70 IN | WEIGHT: 159 LBS | SYSTOLIC BLOOD PRESSURE: 122 MMHG | BODY MASS INDEX: 22.76 KG/M2 | RESPIRATION RATE: 16 BRPM | DIASTOLIC BLOOD PRESSURE: 78 MMHG | TEMPERATURE: 97.9 F | HEART RATE: 76 BPM | OXYGEN SATURATION: 96 %

## 2023-08-08 DIAGNOSIS — F32.0 MAJOR DEPRESSIVE DISORDER, SINGLE EPISODE, MILD (HCC): ICD-10-CM

## 2023-08-08 DIAGNOSIS — Z79.899 OTHER LONG TERM (CURRENT) DRUG THERAPY: ICD-10-CM

## 2023-08-08 DIAGNOSIS — E78.2 MIXED HYPERLIPIDEMIA: ICD-10-CM

## 2023-08-08 DIAGNOSIS — E03.9 HYPOTHYROIDISM, UNSPECIFIED TYPE: ICD-10-CM

## 2023-08-08 DIAGNOSIS — F32.A DEPRESSION, UNSPECIFIED DEPRESSION TYPE: Primary | ICD-10-CM

## 2023-08-08 DIAGNOSIS — R73.01 IMPAIRED FASTING GLUCOSE: ICD-10-CM

## 2023-08-08 DIAGNOSIS — I69.322 DYSARTHRIA FOLLOWING CEREBRAL INFARCTION: ICD-10-CM

## 2023-08-08 DIAGNOSIS — N18.30 STAGE 3 CHRONIC KIDNEY DISEASE, UNSPECIFIED WHETHER STAGE 3A OR 3B CKD (HCC): ICD-10-CM

## 2023-08-08 DIAGNOSIS — M24.541 CONTRACTURE, RIGHT HAND: ICD-10-CM

## 2023-08-08 LAB
BASOPHILS # BLD: 0 K/UL (ref 0–0.1)
BASOPHILS NFR BLD: 0 % (ref 0–1)
COMMENT:: NORMAL
DIFFERENTIAL METHOD BLD: ABNORMAL
EOSINOPHIL # BLD: 0 K/UL (ref 0–0.4)
EOSINOPHIL NFR BLD: 0 % (ref 0–7)
ERYTHROCYTE [DISTWIDTH] IN BLOOD BY AUTOMATED COUNT: 13.7 % (ref 11.5–14.5)
HCT VFR BLD AUTO: 37.6 % (ref 35–47)
HGB BLD-MCNC: 12.4 G/DL (ref 11.5–16)
IMM GRANULOCYTES # BLD AUTO: 0 K/UL (ref 0–0.04)
IMM GRANULOCYTES NFR BLD AUTO: 0 % (ref 0–0.5)
LYMPHOCYTES # BLD: 1.8 K/UL (ref 0.8–3.5)
LYMPHOCYTES NFR BLD: 35 % (ref 12–49)
MCH RBC QN AUTO: 28.8 PG (ref 26–34)
MCHC RBC AUTO-ENTMCNC: 33 G/DL (ref 30–36.5)
MCV RBC AUTO: 87.2 FL (ref 80–99)
MONOCYTES # BLD: 0.6 K/UL (ref 0–1)
MONOCYTES NFR BLD: 12 % (ref 5–13)
NEUTS SEG # BLD: 2.7 K/UL (ref 1.8–8)
NEUTS SEG NFR BLD: 52 % (ref 32–75)
NRBC # BLD: 0 K/UL (ref 0–0.01)
NRBC BLD-RTO: 0 PER 100 WBC
PLATELET # BLD AUTO: 110 K/UL (ref 150–400)
RBC # BLD AUTO: 4.31 M/UL (ref 3.8–5.2)
SPECIMEN HOLD: NORMAL
WBC # BLD AUTO: 5.2 K/UL (ref 3.6–11)

## 2023-08-08 PROCEDURE — 99214 OFFICE O/P EST MOD 30 MIN: CPT | Performed by: INTERNAL MEDICINE

## 2023-08-08 PROCEDURE — 1123F ACP DISCUSS/DSCN MKR DOCD: CPT | Performed by: INTERNAL MEDICINE

## 2023-08-08 PROCEDURE — 3017F COLORECTAL CA SCREEN DOC REV: CPT | Performed by: INTERNAL MEDICINE

## 2023-08-08 PROCEDURE — 1036F TOBACCO NON-USER: CPT | Performed by: INTERNAL MEDICINE

## 2023-08-08 PROCEDURE — G8400 PT W/DXA NO RESULTS DOC: HCPCS | Performed by: INTERNAL MEDICINE

## 2023-08-08 PROCEDURE — G8427 DOCREV CUR MEDS BY ELIG CLIN: HCPCS | Performed by: INTERNAL MEDICINE

## 2023-08-08 PROCEDURE — G8420 CALC BMI NORM PARAMETERS: HCPCS | Performed by: INTERNAL MEDICINE

## 2023-08-08 PROCEDURE — 1090F PRES/ABSN URINE INCON ASSESS: CPT | Performed by: INTERNAL MEDICINE

## 2023-08-08 SDOH — ECONOMIC STABILITY: FOOD INSECURITY: WITHIN THE PAST 12 MONTHS, YOU WORRIED THAT YOUR FOOD WOULD RUN OUT BEFORE YOU GOT MONEY TO BUY MORE.: NEVER TRUE

## 2023-08-08 SDOH — ECONOMIC STABILITY: INCOME INSECURITY: HOW HARD IS IT FOR YOU TO PAY FOR THE VERY BASICS LIKE FOOD, HOUSING, MEDICAL CARE, AND HEATING?: NOT HARD AT ALL

## 2023-08-08 SDOH — ECONOMIC STABILITY: HOUSING INSECURITY
IN THE LAST 12 MONTHS, WAS THERE A TIME WHEN YOU DID NOT HAVE A STEADY PLACE TO SLEEP OR SLEPT IN A SHELTER (INCLUDING NOW)?: NO

## 2023-08-08 SDOH — ECONOMIC STABILITY: FOOD INSECURITY: WITHIN THE PAST 12 MONTHS, THE FOOD YOU BOUGHT JUST DIDN'T LAST AND YOU DIDN'T HAVE MONEY TO GET MORE.: NEVER TRUE

## 2023-08-09 LAB
ALBUMIN SERPL-MCNC: 3.8 G/DL (ref 3.5–5)
ALBUMIN/GLOB SERPL: 1.1 (ref 1.1–2.2)
ALP SERPL-CCNC: 70 U/L (ref 45–117)
ALT SERPL-CCNC: 15 U/L (ref 12–78)
ANION GAP SERPL CALC-SCNC: 7 MMOL/L (ref 5–15)
AST SERPL-CCNC: 8 U/L (ref 15–37)
BILIRUB SERPL-MCNC: 0.3 MG/DL (ref 0.2–1)
BUN SERPL-MCNC: 21 MG/DL (ref 6–20)
BUN/CREAT SERPL: 19 (ref 12–20)
CALCIUM SERPL-MCNC: 9 MG/DL (ref 8.5–10.1)
CHLORIDE SERPL-SCNC: 110 MMOL/L (ref 97–108)
CHOLEST SERPL-MCNC: 120 MG/DL
CO2 SERPL-SCNC: 26 MMOL/L (ref 21–32)
CREAT SERPL-MCNC: 1.1 MG/DL (ref 0.55–1.02)
EST. AVERAGE GLUCOSE BLD GHB EST-MCNC: 117 MG/DL
GLOBULIN SER CALC-MCNC: 3.4 G/DL (ref 2–4)
GLUCOSE SERPL-MCNC: 125 MG/DL (ref 65–100)
HBA1C MFR BLD: 5.7 % (ref 4–5.6)
HDLC SERPL-MCNC: 61 MG/DL
HDLC SERPL: 2 (ref 0–5)
LDLC SERPL CALC-MCNC: 36.8 MG/DL (ref 0–100)
POTASSIUM SERPL-SCNC: 4.1 MMOL/L (ref 3.5–5.1)
PROT SERPL-MCNC: 7.2 G/DL (ref 6.4–8.2)
SODIUM SERPL-SCNC: 143 MMOL/L (ref 136–145)
T4 FREE SERPL-MCNC: 0.8 NG/DL (ref 0.8–1.5)
TRIGL SERPL-MCNC: 111 MG/DL
TSH SERPL DL<=0.05 MIU/L-ACNC: 3.15 UIU/ML (ref 0.36–3.74)
VLDLC SERPL CALC-MCNC: 22.2 MG/DL

## 2023-08-16 RX ORDER — LEVOTHYROXINE SODIUM 0.05 MG/1
TABLET ORAL
Qty: 90 TABLET | Refills: 1 | Status: SHIPPED | OUTPATIENT
Start: 2023-08-16

## 2023-08-21 ENCOUNTER — TELEPHONE (OUTPATIENT)
Age: 78
End: 2023-08-21

## 2023-08-21 ENCOUNTER — HOSPITAL ENCOUNTER (OUTPATIENT)
Facility: HOSPITAL | Age: 78
Discharge: HOME | End: 2023-08-24
Attending: INTERNAL MEDICINE
Payer: MEDICARE

## 2023-08-21 DIAGNOSIS — Z12.31 VISIT FOR SCREENING MAMMOGRAM: ICD-10-CM

## 2023-08-21 PROBLEM — G93.89: Status: RESOLVED | Noted: 2018-09-06 | Resolved: 2023-08-21

## 2023-08-21 PROBLEM — F05: Status: RESOLVED | Noted: 2018-09-06 | Resolved: 2023-08-21

## 2023-08-21 PROCEDURE — 77067 SCR MAMMO BI INCL CAD: CPT

## 2023-08-21 NOTE — TELEPHONE ENCOUNTER
Reason for call:  Spoke with pt's   He requested a refill    divalproex (DEPAKOTE) 500 MG DR tablet      send to   [facility]/pharmacy #[id number]- [address], [address] Danna Pardo [phone]    Is this a new problem: Yes    Date of last appointment:  8/8/2023     Can we respond via Fluthert: No    Best call back number: Stef Shankar   [phone]

## 2023-08-23 DIAGNOSIS — F32.A DEPRESSION, UNSPECIFIED: ICD-10-CM

## 2023-08-23 RX ORDER — CLOPIDOGREL BISULFATE 75 MG/1
TABLET ORAL
Qty: 90 TABLET | Refills: 1 | Status: SHIPPED | OUTPATIENT
Start: 2023-08-23

## 2023-08-23 RX ORDER — SERTRALINE HYDROCHLORIDE 100 MG/1
TABLET, FILM COATED ORAL
Qty: 180 TABLET | Refills: 1 | Status: SHIPPED | OUTPATIENT
Start: 2023-08-23

## 2023-08-23 RX ORDER — DIVALPROEX SODIUM 500 MG/1
TABLET, DELAYED RELEASE ORAL
Qty: 180 TABLET | Refills: 3 | Status: SHIPPED | OUTPATIENT
Start: 2023-08-23

## 2023-08-23 RX ORDER — ATORVASTATIN CALCIUM 20 MG/1
TABLET, FILM COATED ORAL
Qty: 90 TABLET | Refills: 1 | Status: SHIPPED | OUTPATIENT
Start: 2023-08-23

## 2023-09-18 ENCOUNTER — TELEPHONE (OUTPATIENT)
Age: 78
End: 2023-09-18

## 2023-09-18 DIAGNOSIS — I69.322 DYSARTHRIA FOLLOWING CEREBRAL INFARCTION: Primary | ICD-10-CM

## 2023-09-18 DIAGNOSIS — M24.541 CONTRACTURE, RIGHT HAND: ICD-10-CM

## 2023-09-18 NOTE — TELEPHONE ENCOUNTER
Reason for call:  [facility]  [facility] pt's [facility]. Pt id verified. Mr. Fariha Sy requesting a referral be put in for pt to have Speech/OT at [address] [place] [address]. Mr. Fariha Sy would like to  referral on Wednesday, September 20.  Mr. Fariha Sy would also like to  a copy of pt's last labs with referral.     Is this a new problem: Yes    Date of last appointment:  8/8/2023     Can we respond via GenomOncology: No    Best call back number: [phone]

## 2023-10-23 ENCOUNTER — TELEPHONE (OUTPATIENT)
Age: 78
End: 2023-10-23

## 2023-10-23 NOTE — TELEPHONE ENCOUNTER
Bonifacio Suggs to patient's  Nikita Chino, (Miguelina Flores). Dr Rose Marie Valle recommends [facility].  [phone].

## 2023-10-23 NOTE — TELEPHONE ENCOUNTER
Reason for call:  [facility] has [facility].  Please call with a recommendation  of to leave message  Is this a new problem: Yes    Date of last appointment:  8/8/2023     Can we respond via MedNewst: no    Best call back number:   Stef Shankar () [phone] (Home) SELINA (acute kidney injury) Type 2 diabetes mellitus without complication, without long-term current use of insulin

## 2024-02-09 RX ORDER — LEVOTHYROXINE SODIUM 0.05 MG/1
TABLET ORAL
Qty: 90 TABLET | Refills: 0 | Status: SHIPPED | OUTPATIENT
Start: 2024-02-09

## 2024-02-27 ENCOUNTER — OFFICE VISIT (OUTPATIENT)
Age: 79
End: 2024-02-27
Payer: MEDICARE

## 2024-02-27 VITALS
HEART RATE: 81 BPM | OXYGEN SATURATION: 99 % | DIASTOLIC BLOOD PRESSURE: 60 MMHG | RESPIRATION RATE: 16 BRPM | WEIGHT: 158.6 LBS | BODY MASS INDEX: 22.71 KG/M2 | SYSTOLIC BLOOD PRESSURE: 110 MMHG | TEMPERATURE: 97.5 F | HEIGHT: 70 IN

## 2024-02-27 DIAGNOSIS — R41.89 COGNITIVE IMPAIRMENT: ICD-10-CM

## 2024-02-27 DIAGNOSIS — I69.359 CVA, OLD, HEMIPARESIS (HCC): ICD-10-CM

## 2024-02-27 DIAGNOSIS — Z00.00 MEDICARE ANNUAL WELLNESS VISIT, SUBSEQUENT: Primary | ICD-10-CM

## 2024-02-27 DIAGNOSIS — E78.2 MIXED HYPERLIPIDEMIA: ICD-10-CM

## 2024-02-27 DIAGNOSIS — N18.30 STAGE 3 CHRONIC KIDNEY DISEASE, UNSPECIFIED WHETHER STAGE 3A OR 3B CKD (HCC): ICD-10-CM

## 2024-02-27 DIAGNOSIS — G40.209 LOCALIZATION-RELATED (FOCAL) (PARTIAL) SYMPTOMATIC EPILEPSY AND EPILEPTIC SYNDROMES WITH COMPLEX PARTIAL SEIZURES, NOT INTRACTABLE, WITHOUT STATUS EPILEPTICUS (HCC): ICD-10-CM

## 2024-02-27 DIAGNOSIS — F32.0 MAJOR DEPRESSIVE DISORDER, SINGLE EPISODE, MILD (HCC): ICD-10-CM

## 2024-02-27 DIAGNOSIS — E03.9 HYPOTHYROIDISM, UNSPECIFIED TYPE: ICD-10-CM

## 2024-02-27 DIAGNOSIS — M24.541 CONTRACTURE, RIGHT HAND: ICD-10-CM

## 2024-02-27 DIAGNOSIS — I69.322 DYSARTHRIA FOLLOWING CEREBRAL INFARCTION: ICD-10-CM

## 2024-02-27 DIAGNOSIS — R73.01 IMPAIRED FASTING GLUCOSE: ICD-10-CM

## 2024-02-27 PROBLEM — D69.6 THROMBOCYTOPENIA, UNSPECIFIED (HCC): Status: ACTIVE | Noted: 2024-02-27

## 2024-02-27 PROBLEM — D69.6 THROMBOCYTOPENIA, UNSPECIFIED (HCC): Status: RESOLVED | Noted: 2024-02-27 | Resolved: 2024-02-27

## 2024-02-27 PROCEDURE — G0439 PPPS, SUBSEQ VISIT: HCPCS | Performed by: INTERNAL MEDICINE

## 2024-02-27 PROCEDURE — 99213 OFFICE O/P EST LOW 20 MIN: CPT | Performed by: INTERNAL MEDICINE

## 2024-02-27 PROCEDURE — G8484 FLU IMMUNIZE NO ADMIN: HCPCS | Performed by: INTERNAL MEDICINE

## 2024-02-27 PROCEDURE — G8400 PT W/DXA NO RESULTS DOC: HCPCS | Performed by: INTERNAL MEDICINE

## 2024-02-27 PROCEDURE — G8427 DOCREV CUR MEDS BY ELIG CLIN: HCPCS | Performed by: INTERNAL MEDICINE

## 2024-02-27 PROCEDURE — 1036F TOBACCO NON-USER: CPT | Performed by: INTERNAL MEDICINE

## 2024-02-27 PROCEDURE — 1123F ACP DISCUSS/DSCN MKR DOCD: CPT | Performed by: INTERNAL MEDICINE

## 2024-02-27 PROCEDURE — 1090F PRES/ABSN URINE INCON ASSESS: CPT | Performed by: INTERNAL MEDICINE

## 2024-02-27 PROCEDURE — G8420 CALC BMI NORM PARAMETERS: HCPCS | Performed by: INTERNAL MEDICINE

## 2024-02-27 SDOH — ECONOMIC STABILITY: FOOD INSECURITY: WITHIN THE PAST 12 MONTHS, YOU WORRIED THAT YOUR FOOD WOULD RUN OUT BEFORE YOU GOT MONEY TO BUY MORE.: NEVER TRUE

## 2024-02-27 SDOH — ECONOMIC STABILITY: FOOD INSECURITY: WITHIN THE PAST 12 MONTHS, THE FOOD YOU BOUGHT JUST DIDN'T LAST AND YOU DIDN'T HAVE MONEY TO GET MORE.: NEVER TRUE

## 2024-02-27 ASSESSMENT — LIFESTYLE VARIABLES
HOW MANY STANDARD DRINKS CONTAINING ALCOHOL DO YOU HAVE ON A TYPICAL DAY: PATIENT DOES NOT DRINK
HOW OFTEN DO YOU HAVE A DRINK CONTAINING ALCOHOL: NEVER

## 2024-02-27 ASSESSMENT — ANXIETY QUESTIONNAIRES
4. TROUBLE RELAXING: 0
7. FEELING AFRAID AS IF SOMETHING AWFUL MIGHT HAPPEN: 0
IF YOU CHECKED OFF ANY PROBLEMS ON THIS QUESTIONNAIRE, HOW DIFFICULT HAVE THESE PROBLEMS MADE IT FOR YOU TO DO YOUR WORK, TAKE CARE OF THINGS AT HOME, OR GET ALONG WITH OTHER PEOPLE: NOT DIFFICULT AT ALL
2. NOT BEING ABLE TO STOP OR CONTROL WORRYING: 3
3. WORRYING TOO MUCH ABOUT DIFFERENT THINGS: 0
5. BEING SO RESTLESS THAT IT IS HARD TO SIT STILL: 0
1. FEELING NERVOUS, ANXIOUS, OR ON EDGE: 0
GAD7 TOTAL SCORE: 3
6. BECOMING EASILY ANNOYED OR IRRITABLE: 0

## 2024-02-27 NOTE — PROGRESS NOTES
Chief Complaint   Patient presents with    Medicare AWV     eviewed record in preparation for visit and have obtained necessary documentation.    Identified pt with two pt identifiers(name and ).      Health Maintenance Due   Topic    DTaP/Tdap/Td vaccine (1 - Tdap)    DEXA (modify frequency per FRAX score)     Respiratory Syncytial Virus (RSV) Pregnant or age 60 yrs+ (1 - 1-dose 60+ series)    Shingles vaccine (2 of 2)    Annual Wellness Visit (Medicare)          Chief Complaint   Patient presents with    Medicare AWV        Wt Readings from Last 3 Encounters:   24 71.9 kg (158 lb 9.6 oz)   23 72.1 kg (159 lb)   23 72.1 kg (159 lb)     Temp Readings from Last 3 Encounters:   24 97.5 °F (36.4 °C) (Temporal)   23 97.9 °F (36.6 °C) (Temporal)     BP Readings from Last 3 Encounters:   24 110/60   23 122/78   23 134/88     Pulse Readings from Last 3 Encounters:   24 81   23 76   23 72           Learning Assessment:  :    Failed to redirect to the Timeline version of the [employer] SmartLink.    Depression Screening:  :         No data to display                Fall Risk Assessment:  :    Failed to redirect to the Timeline version of the [employer] SmartLink.    Abuse Screening:  :         No data to display                Coordination of Care Questionnaire:  :    1) Have you been to an emergency room, urgent care clinic since your last visit? no   Hospitalized since your last visit? no             2) Have you seen or consulted any other health care providers outside of [facility] since your last visit? no  (Include any pap smears or colon screenings in this section.)    3) Do you have an Advance Directive on file? no    4) Are you interested in receiving information on Advance Directives? No

## 2024-02-27 NOTE — PROGRESS NOTES
Medicare Annual Wellness Visit    Genna A Evelin is here for Medicare AWV    Assessment & Plan   Medicare annual wellness visit, subsequent  -has up to date ACP on file.  -[facility] decision maker reviewed with patient and updated.      2. Dysarthria following cerebral infarction  -recommended speech therapy.  Has had in past, intermittently.   -has deficit with processing of words if they are heard and not seen written.     -     External Referral To Occupational Therapy    3. CVA, old, hemiparesis (HCC)  -contracture of right hand.  Has had intermittent PT in past.  -does do stretches and uses brace made by PT in the past to help with stretching.   -recommended restarting PT.    -     External Referral To Physical Therapy  -     External Referral To Occupational Therapy    4. Major depressive disorder, single episode, mild (HCC)  -mood stable with use of zoloft 200mg daily. No adjustments needed at this time.     5. Localization-related (focal) (partial) symptomatic epilepsy and epileptic syndromes with complex partial seizures, not intractable, without status epilepticus (HCC)  -following with neurology, Dr. Bhatt.  He ordered valproic levels at last visit that hasn't been completed yet. Will order today.  -has follow up in July, 2024.    -     Valproic Acid Level, Total; Future    6. Stage 3 chronic kidney disease, unspecified whether stage 3a or 3b CKD (HCC)    7. Mixed hyperlipidemia  -taking atorvastatin 20mg daily.   -maintaining low fat diet.     -     Comprehensive Metabolic Panel; Future    8. Hypothyroidism, unspecified type  -taking levothyroxine 50mcg daily.   -check Thyroid function today    -     TSH; Future  -     T4, Free; Future    9. Impaired fasting glucose  -weight stable.     -     Hemoglobin A1C; Future    10. Contracture, right hand  -     External Referral To Physical Therapy    11. Cognitive impairment  -able to read simple 2-4 word sentences.  Approximately 2-3 grade

## 2024-02-28 DIAGNOSIS — G40.209 LOCALIZATION-RELATED (FOCAL) (PARTIAL) SYMPTOMATIC EPILEPSY AND EPILEPTIC SYNDROMES WITH COMPLEX PARTIAL SEIZURES, NOT INTRACTABLE, WITHOUT STATUS EPILEPTICUS (HCC): ICD-10-CM

## 2024-02-28 DIAGNOSIS — R73.01 IMPAIRED FASTING GLUCOSE: ICD-10-CM

## 2024-02-28 DIAGNOSIS — E03.9 HYPOTHYROIDISM, UNSPECIFIED TYPE: ICD-10-CM

## 2024-02-28 DIAGNOSIS — E78.2 MIXED HYPERLIPIDEMIA: ICD-10-CM

## 2024-02-28 LAB
ALBUMIN SERPL-MCNC: 3.8 G/DL (ref 3.5–5)
ALBUMIN/GLOB SERPL: 1 (ref 1.1–2.2)
ALP SERPL-CCNC: 71 U/L (ref 45–117)
ALT SERPL-CCNC: 11 U/L (ref 12–78)
ANION GAP SERPL CALC-SCNC: 8 MMOL/L (ref 5–15)
AST SERPL-CCNC: 14 U/L (ref 15–37)
BILIRUB SERPL-MCNC: 0.4 MG/DL (ref 0.2–1)
BUN SERPL-MCNC: 20 MG/DL (ref 6–20)
BUN/CREAT SERPL: 17 (ref 12–20)
CALCIUM SERPL-MCNC: 9.6 MG/DL (ref 8.5–10.1)
CHLORIDE SERPL-SCNC: 111 MMOL/L (ref 97–108)
CO2 SERPL-SCNC: 26 MMOL/L (ref 21–32)
CREAT SERPL-MCNC: 1.21 MG/DL (ref 0.55–1.02)
EST. AVERAGE GLUCOSE BLD GHB EST-MCNC: 120 MG/DL
GLOBULIN SER CALC-MCNC: 3.7 G/DL (ref 2–4)
GLUCOSE SERPL-MCNC: 142 MG/DL (ref 65–100)
HBA1C MFR BLD: 5.8 % (ref 4–5.6)
POTASSIUM SERPL-SCNC: 4.4 MMOL/L (ref 3.5–5.1)
PROT SERPL-MCNC: 7.5 G/DL (ref 6.4–8.2)
SODIUM SERPL-SCNC: 145 MMOL/L (ref 136–145)
T4 FREE SERPL-MCNC: 0.8 NG/DL (ref 0.8–1.5)
TSH SERPL DL<=0.05 MIU/L-ACNC: 3.97 UIU/ML (ref 0.36–3.74)
VALPROATE SERPL-MCNC: 136 UG/ML (ref 50–100)

## 2024-03-12 RX ORDER — CLOPIDOGREL BISULFATE 75 MG/1
TABLET ORAL
Qty: 90 TABLET | Refills: 1 | Status: SHIPPED | OUTPATIENT
Start: 2024-03-12

## 2024-03-20 ENCOUNTER — TELEPHONE (OUTPATIENT)
Age: 79
End: 2024-03-20

## 2024-03-20 DIAGNOSIS — R41.89 COGNITIVE IMPAIRMENT: ICD-10-CM

## 2024-03-20 DIAGNOSIS — M24.541 CONTRACTURE, RIGHT HAND: ICD-10-CM

## 2024-03-20 DIAGNOSIS — I69.322 DYSARTHRIA FOLLOWING CEREBRAL INFARCTION: Primary | ICD-10-CM

## 2024-03-20 NOTE — TELEPHONE ENCOUNTER
Reason for call:  Spoke with pt's ,   Per  the order # [id number] is incorrect. Pt do not need physical therapy.  Pt need a new order for OT and speech therapy.   would like to pick order at the office.  Please call when order  is ready    Is this a new problem: Yes    Date of last appointment:  2/27/2024     Can we respond via Unveilhart: No    Best call back number: Genna Waters   [phone]    Reviewed with Dr. Yung who states assure patient site is fine. Pt to call if there is redness, drainage, swelling. Also assure patient mid RCA occluded, but collaterals are filling from left. Will continue to manage medically. Will FU with patient at 6/27 OV.

## 2024-03-29 DIAGNOSIS — F32.A DEPRESSION, UNSPECIFIED: ICD-10-CM

## 2024-03-29 RX ORDER — SERTRALINE HYDROCHLORIDE 100 MG/1
TABLET, FILM COATED ORAL
Qty: 180 TABLET | Refills: 1 | Status: SHIPPED | OUTPATIENT
Start: 2024-03-29

## 2024-04-25 RX ORDER — ATORVASTATIN CALCIUM 20 MG/1
TABLET, FILM COATED ORAL
Qty: 90 TABLET | Refills: 1 | Status: SHIPPED | OUTPATIENT
Start: 2024-04-25

## 2024-04-25 NOTE — TELEPHONE ENCOUNTER
Rx sent to pharmacy as previously filled and verified by Verbal Order Read Back with provider.    NV 08/27/2024

## 2024-05-08 RX ORDER — LEVOTHYROXINE SODIUM 0.05 MG/1
TABLET ORAL
Qty: 90 TABLET | Refills: 0 | Status: SHIPPED | OUTPATIENT
Start: 2024-05-08

## 2024-05-16 ENCOUNTER — TELEPHONE (OUTPATIENT)
Age: 79
End: 2024-05-16

## 2024-07-26 ENCOUNTER — OFFICE VISIT (OUTPATIENT)
Age: 79
End: 2024-07-26

## 2024-07-26 VITALS
HEIGHT: 70 IN | BODY MASS INDEX: 21.9 KG/M2 | OXYGEN SATURATION: 98 % | WEIGHT: 153 LBS | SYSTOLIC BLOOD PRESSURE: 136 MMHG | DIASTOLIC BLOOD PRESSURE: 84 MMHG | HEART RATE: 80 BPM

## 2024-07-26 DIAGNOSIS — I69.359 CVA, OLD, HEMIPARESIS (HCC): Primary | ICD-10-CM

## 2024-07-26 DIAGNOSIS — G40.209 LOCALIZATION-RELATED (FOCAL) (PARTIAL) SYMPTOMATIC EPILEPSY AND EPILEPTIC SYNDROMES WITH COMPLEX PARTIAL SEIZURES, NOT INTRACTABLE, WITHOUT STATUS EPILEPTICUS (HCC): ICD-10-CM

## 2024-07-26 RX ORDER — DIVALPROEX SODIUM 125 MG/1
125 TABLET, DELAYED RELEASE ORAL 2 TIMES DAILY
Qty: 60 TABLET | Refills: 5 | Status: SHIPPED | OUTPATIENT
Start: 2024-07-26

## 2024-07-26 RX ORDER — DIVALPROEX SODIUM 250 MG/1
250 TABLET, DELAYED RELEASE ORAL 2 TIMES DAILY
Qty: 60 TABLET | Refills: 5 | Status: SHIPPED | OUTPATIENT
Start: 2024-07-26

## 2024-07-26 NOTE — PROGRESS NOTES
Chief Complaint   Patient presents with    Follow-up     Epilepsy       HISTORY OF PRESENT ILLNESS  Genna ShenDanielRaad remember.  She was last seen a year ago.    Overall, she has remained stable.  There have been no further seizures.  She has been through physical therapy/home PT and continues to do home exercises.  She now uses iPad to exercise her brain and sometimes to communicate.  Moderate degree of expressive aphasia persists.  She had routine labs and Depakote level done in February of this year which showed elevated Depakote level at 138    RECAP  She has a history of 2 strokes with residual right hemiparesis and aphasia.  For stroke was in 2014 and it was suspected to be due to thrombosis of a blood vessel, she received thrombolysis but then ended up having small bleeding complication.  She recovered quite well from it and was on seizure prophylaxis.  She was taken off of prophylactic medication and ended up having a seizure after which she was put on Depakote.  She had another stroke in 2016 that again impacted the same left side of her brain and she had more expressive aphasia and right upper extremity weakness.  She has improved but still has difficulty with word finding and struggles with fluency.    Takes Plavix and sertraline    Current Outpatient Medications   Medication Sig    divalproex (DEPAKOTE) 250 MG DR tablet Take 1 tablet by mouth 2 times daily Take in addition to 125 mg tab for total dose 375 mg BID    divalproex (DEPAKOTE) 125 MG DR tablet Take 1 tablet by mouth 2 times daily Take in addition to 250 mg tab for total dose of 375 mg BID    levothyroxine (SYNTHROID) 50 MCG tablet TAKE 1 TABLET BY MOUTH EVERY DAY BEFORE BREAKFAST    atorvastatin (LIPITOR) 20 MG tablet TAKE 1 TABLET BY MOUTH EVERY DAY.NEED OFFICE VISIT FOR FURTHER REFILLS    sertraline (ZOLOFT) 100 MG tablet TAKE 2 TABLETS BY MOUTH EVERY DAY    clopidogrel (PLAVIX) 75 MG tablet TAKE 1 TABLET BY MOUTH EVERY DAY    loteprednol

## 2024-07-26 NOTE — PROGRESS NOTES
Chief Complaint   Patient presents with    Follow-up     Epilepsy     Vitals:    07/26/24 1417   BP: 136/84   Pulse: 80   SpO2: 98%

## 2024-08-05 RX ORDER — LEVOTHYROXINE SODIUM 0.05 MG/1
TABLET ORAL
Qty: 90 TABLET | Refills: 0 | Status: SHIPPED | OUTPATIENT
Start: 2024-08-05

## 2024-08-05 NOTE — TELEPHONE ENCOUNTER
Rx sent to pharmacy as previously filled and verified by Verbal Order Read Back with provider.    09/03/2024

## 2024-08-07 ENCOUNTER — TELEPHONE (OUTPATIENT)
Age: 79
End: 2024-08-07

## 2024-08-07 ENCOUNTER — OFFICE VISIT (OUTPATIENT)
Age: 79
End: 2024-08-07
Payer: MEDICARE

## 2024-08-07 ENCOUNTER — HOSPITAL ENCOUNTER (OUTPATIENT)
Age: 79
Discharge: HOME | End: 2024-08-10
Payer: MEDICARE

## 2024-08-07 VITALS
WEIGHT: 151.2 LBS | BODY MASS INDEX: 21.64 KG/M2 | RESPIRATION RATE: 16 BRPM | OXYGEN SATURATION: 98 % | TEMPERATURE: 97.1 F | SYSTOLIC BLOOD PRESSURE: 104 MMHG | HEART RATE: 75 BPM | DIASTOLIC BLOOD PRESSURE: 70 MMHG | HEIGHT: 70 IN

## 2024-08-07 DIAGNOSIS — R31.9 HEMATURIA, UNSPECIFIED TYPE: ICD-10-CM

## 2024-08-07 DIAGNOSIS — R31.9 HEMATURIA, UNSPECIFIED TYPE: Primary | ICD-10-CM

## 2024-08-07 LAB
ALBUMIN SERPL-MCNC: 4 G/DL (ref 3.5–5)
ALBUMIN/GLOB SERPL: 1.1 (ref 1.1–2.2)
ALP SERPL-CCNC: 55 U/L (ref 45–117)
ALT SERPL-CCNC: 12 U/L (ref 12–78)
ANION GAP SERPL CALC-SCNC: 4 MMOL/L (ref 5–15)
AST SERPL-CCNC: 13 U/L (ref 15–37)
BASOPHILS # BLD: 0 K/UL (ref 0–0.1)
BASOPHILS NFR BLD: 0 % (ref 0–1)
BILIRUB SERPL-MCNC: 0.4 MG/DL (ref 0.2–1)
BILIRUBIN, URINE, POC: ABNORMAL
BLOOD URINE, POC: ABNORMAL
BUN SERPL-MCNC: 22 MG/DL (ref 6–20)
BUN/CREAT SERPL: 18 (ref 12–20)
CALCIUM SERPL-MCNC: 9.6 MG/DL (ref 8.5–10.1)
CHLORIDE SERPL-SCNC: 109 MMOL/L (ref 97–108)
CO2 SERPL-SCNC: 27 MMOL/L (ref 21–32)
CREAT BLD-MCNC: 1.3 MG/DL (ref 0.6–1.3)
CREAT SERPL-MCNC: 1.19 MG/DL (ref 0.55–1.02)
DIFFERENTIAL METHOD BLD: ABNORMAL
EOSINOPHIL # BLD: 0 K/UL (ref 0–0.4)
EOSINOPHIL NFR BLD: 0 % (ref 0–7)
ERYTHROCYTE [DISTWIDTH] IN BLOOD BY AUTOMATED COUNT: 14.7 % (ref 11.5–14.5)
GLOBULIN SER CALC-MCNC: 3.5 G/DL (ref 2–4)
GLUCOSE SERPL-MCNC: 94 MG/DL (ref 65–100)
GLUCOSE URINE, POC: NEGATIVE
HCT VFR BLD AUTO: 38 % (ref 35–47)
HGB BLD-MCNC: 12.2 G/DL (ref 11.5–16)
IMM GRANULOCYTES # BLD AUTO: 0 K/UL (ref 0–0.04)
IMM GRANULOCYTES NFR BLD AUTO: 0 % (ref 0–0.5)
KETONES, URINE, POC: ABNORMAL
LEUKOCYTE ESTERASE, URINE, POC: ABNORMAL
LYMPHOCYTES # BLD: 2.6 K/UL (ref 0.8–3.5)
LYMPHOCYTES NFR BLD: 42 % (ref 12–49)
MCH RBC QN AUTO: 28.4 PG (ref 26–34)
MCHC RBC AUTO-ENTMCNC: 32.1 G/DL (ref 30–36.5)
MCV RBC AUTO: 88.6 FL (ref 80–99)
MONOCYTES # BLD: 0.6 K/UL (ref 0–1)
MONOCYTES NFR BLD: 9 % (ref 5–13)
NEUTS SEG # BLD: 3 K/UL (ref 1.8–8)
NEUTS SEG NFR BLD: 48 % (ref 32–75)
NITRITE, URINE, POC: NEGATIVE
NRBC # BLD: 0 K/UL (ref 0–0.01)
NRBC BLD-RTO: 0 PER 100 WBC
PH, URINE, POC: 8.5 (ref 4.6–8)
PLATELET # BLD AUTO: 111 K/UL (ref 150–400)
POTASSIUM SERPL-SCNC: 4.2 MMOL/L (ref 3.5–5.1)
PROT SERPL-MCNC: 7.5 G/DL (ref 6.4–8.2)
PROTEIN,URINE, POC: ABNORMAL
RBC # BLD AUTO: 4.29 M/UL (ref 3.8–5.2)
SODIUM SERPL-SCNC: 140 MMOL/L (ref 136–145)
SPECIFIC GRAVITY, URINE, POC: 1.01 (ref 1–1.03)
URINALYSIS CLARITY, POC: ABNORMAL
URINALYSIS COLOR, POC: ABNORMAL
UROBILINOGEN, POC: ABNORMAL
WBC # BLD AUTO: 6.2 K/UL (ref 3.6–11)

## 2024-08-07 PROCEDURE — 6360000004 HC RX CONTRAST MEDICATION: Performed by: RADIOLOGY

## 2024-08-07 PROCEDURE — PBSHW AMB POC URINALYSIS DIP STICK AUTO W/ MICRO: Performed by: NURSE PRACTITIONER

## 2024-08-07 PROCEDURE — 1123F ACP DISCUSS/DSCN MKR DOCD: CPT | Performed by: NURSE PRACTITIONER

## 2024-08-07 PROCEDURE — G8400 PT W/DXA NO RESULTS DOC: HCPCS | Performed by: NURSE PRACTITIONER

## 2024-08-07 PROCEDURE — 99213 OFFICE O/P EST LOW 20 MIN: CPT | Performed by: NURSE PRACTITIONER

## 2024-08-07 PROCEDURE — G8427 DOCREV CUR MEDS BY ELIG CLIN: HCPCS | Performed by: NURSE PRACTITIONER

## 2024-08-07 PROCEDURE — 82565 ASSAY OF CREATININE: CPT

## 2024-08-07 PROCEDURE — 81001 URINALYSIS AUTO W/SCOPE: CPT | Performed by: NURSE PRACTITIONER

## 2024-08-07 PROCEDURE — G8420 CALC BMI NORM PARAMETERS: HCPCS | Performed by: NURSE PRACTITIONER

## 2024-08-07 PROCEDURE — 74177 CT ABD & PELVIS W/CONTRAST: CPT

## 2024-08-07 PROCEDURE — 1036F TOBACCO NON-USER: CPT | Performed by: NURSE PRACTITIONER

## 2024-08-07 PROCEDURE — 1090F PRES/ABSN URINE INCON ASSESS: CPT | Performed by: NURSE PRACTITIONER

## 2024-08-07 RX ORDER — NITROFURANTOIN 25; 75 MG/1; MG/1
100 CAPSULE ORAL 2 TIMES DAILY
Qty: 14 CAPSULE | Refills: 0 | Status: SHIPPED | OUTPATIENT
Start: 2024-08-07 | End: 2024-08-14

## 2024-08-07 RX ADMIN — IOPAMIDOL 100 ML: 755 INJECTION, SOLUTION INTRAVENOUS at 13:17

## 2024-08-07 ASSESSMENT — PATIENT HEALTH QUESTIONNAIRE - PHQ9: DEPRESSION UNABLE TO ASSESS: PT REFUSES

## 2024-08-07 NOTE — PROGRESS NOTES
Genna Waters (:  1945) is a 78 y.o. female,here for evaluation of the following chief complaint(s):  Hematuria (Started Monday )    /70   Pulse 75   Temp 97.1 °F (36.2 °C) (Temporal)   Resp 16   Ht 1.778 m (5' 10\")   Wt 68.6 kg (151 lb 3.2 oz)   SpO2 98%   BMI 21.69 kg/m²       SUBJECTIVE/OBJECTIVE:    HPI:Patient reports blood in urine starting 2 days ago. She notes dysuria. She states there was blood in stool yesterday. Patient is poor historian given history of CVA. No fever. Patient is on Plavix.    Review of Systems   Genitourinary:  Positive for hematuria.       Physical Exam  Constitutional:       Appearance: Normal appearance.   HENT:      Head: Normocephalic.   Abdominal:      General: Abdomen is protuberant.      Tenderness: There is no abdominal tenderness.   Skin:     General: Skin is warm and dry.   Neurological:      Mental Status: She is alert and oriented to person, place, and time. Mental status is at baseline.   Psychiatric:         Mood and Affect: Affect is inappropriate.         Behavior: Behavior is cooperative.       Results for orders placed or performed in visit on 24   AMB POC URINALYSIS DIP STICK AUTO W/ MICRO   Result Value Ref Range    Color (UA POC) Dark Francia     Clarity (UA POC) Cloudy     Glucose, Urine, POC Negative     Bilirubin, Urine, POC 3+     Ketones, Urine, POC 1+     Specific Gravity, Urine, POC 1.015 1.001 - 1.035    Blood (UA POC) 3+     pH, Urine, POC 8.5 (A) 4.6 - 8.0    Protein, Urine, POC 3+     Urobilinogen, POC 0.2-1 mg/dL     Nitrite, Urine, POC Negative     Leukocyte Esterase, Urine, POC 3+            ASSESSMENT/PLAN:  1. Hematuria, unspecified type  -     CBC with Auto Differential; Future  -     Urinalysis with Microscopic; Future  -     Comprehensive Metabolic Panel; Future  -     Culture, Urine; Future  -     AMB POC URINALYSIS DIP STICK AUTO W/ MICRO  -     CT ABDOMEN PELVIS W IV CONTRAST Additional Contrast? Radiologist

## 2024-08-07 NOTE — TELEPHONE ENCOUNTER
Reason for call:  Lupe with [facility] call back returning Alexander Escudero phone call.     Is this a new problem: Yes    Date of last appointment:  8/7/2024     Can we respond via [employer]: No    Best call back number:     Lupe w/ [facility] [phone] ext [phone]

## 2024-08-08 ENCOUNTER — TELEPHONE (OUTPATIENT)
Age: 79
End: 2024-08-08

## 2024-08-08 LAB
BACTERIA SPEC CULT: NORMAL
CC UR VC: NORMAL
SERVICE CMNT-IMP: NORMAL

## 2024-08-08 NOTE — TELEPHONE ENCOUNTER
Reason for call:  please call pt spouse with results of xrays from 08/07/24     Is this a new problem: Yes    Date of last appointment:  8/7/2024     Can we respond via [employer]: No    Best call back number:     Stef Shankar () [phone] (Home)

## 2024-08-12 ENCOUNTER — TRANSCRIBE ORDERS (OUTPATIENT)
Facility: HOSPITAL | Age: 79
End: 2024-08-12

## 2024-08-12 ENCOUNTER — TELEPHONE (OUTPATIENT)
Age: 79
End: 2024-08-12

## 2024-08-12 DIAGNOSIS — Z12.31 VISIT FOR SCREENING MAMMOGRAM: Primary | ICD-10-CM

## 2024-08-12 NOTE — TELEPHONE ENCOUNTER
Patient's  states pt has been having blood in her urine and Urology clinic is requesting to speak with Dr Bhatt before proceeding with needed procedure. He is requesting a call back to discuss. Please contact

## 2024-08-13 ENCOUNTER — TELEPHONE (OUTPATIENT)
Age: 79
End: 2024-08-13

## 2024-08-13 NOTE — TELEPHONE ENCOUNTER
8/13/2024 spoke with patient's , Stef.  Patient's primary care giver  -patient has been on levothyroxine it appears since her first stroke.  Stef notified.  He states understanding.    -she is scheduled to have a procedure done at [facility] next week with Dr. Millan for gross hematuria.

## 2024-08-13 NOTE — TELEPHONE ENCOUNTER
Stated that they sent a medical request clearance Monday, 8/12/24 and would like to schedule her for surgery as soon as possible.     Jaimie would like to know how long does Dr. Bhatt want pt off her blood thinner? Also requesting to leave detail message if she does not answer phone call.    Please contact [phone].

## 2024-08-13 NOTE — TELEPHONE ENCOUNTER
Reason for call:  wants to know why the pt is on thyroid medication?  Before her stroke she had no thyroid issues  please call and explain    Is this a new problem: Yes    Date of last appointment:  8/7/2024     Can we respond via [employer]: No    Best call back number:     Stef Shankar () [phone] (Home)      No pertinent past medical history     <<----- Click to add NO pertinent Past Medical History

## 2024-08-14 ENCOUNTER — TELEPHONE (OUTPATIENT)
Age: 79
End: 2024-08-14

## 2024-08-14 NOTE — TELEPHONE ENCOUNTER
Called to check on patient. She is having a urologic procedure on Monday. Spouse is unsure of the procedure.

## 2024-08-14 NOTE — TELEPHONE ENCOUNTER
Patient's  requesting call back. Patient is having a procedure on Mon. because she has blood in her urine.  needs to know if she needs to stop blood thinner med. now.    [facility] call & advise  Stef Shankar  [phone]

## 2024-08-15 NOTE — TELEPHONE ENCOUNTER
Jaimie called back, advised form was faxed over on 08/13/2024 and again on 08/14/2024. She stated she only received one copy of the form from 08/13/2024 and \"everything is ok now, just disregard that message.\"

## 2024-08-15 NOTE — TELEPHONE ENCOUNTER
Patient's  came in the office, was provided paperwork for clearance that has also been faxed to [facility] at [phone]. JL

## 2024-08-15 NOTE — TELEPHONE ENCOUNTER
Patient's  came in the office, was provided paperwork for clearance that has also been faxed to [facility] at [phone]. JL

## 2024-08-15 NOTE — TELEPHONE ENCOUNTER
Jaimie with [facility] called in for an update regarding clearance. Jaimie sounded upset that no from our office reached out to her regarding clearance and stated that is unacceptable.      Please contact [phone].

## 2024-08-15 NOTE — TELEPHONE ENCOUNTER
Please inform that patient has had 2 strokes in the past and she is on clopidogrel for prophylaxis.  If it must be held for surgical procedure, they may hold it for the least duration possible i.e. 4 to 5 days and resume as soon as possible after surgery.     Austin Bhatt MD

## 2024-08-22 ENCOUNTER — HOSPITAL ENCOUNTER (OUTPATIENT)
Facility: HOSPITAL | Age: 79
Discharge: HOME | End: 2024-08-22
Attending: INTERNAL MEDICINE
Payer: MEDICARE

## 2024-08-22 VITALS — HEIGHT: 70 IN | WEIGHT: 150 LBS | BODY MASS INDEX: 21.47 KG/M2

## 2024-08-22 DIAGNOSIS — Z12.31 VISIT FOR SCREENING MAMMOGRAM: ICD-10-CM

## 2024-08-22 PROCEDURE — 77067 SCR MAMMO BI INCL CAD: CPT

## 2024-09-02 NOTE — PROGRESS NOTES
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   -depression - zoloft 200mg daily  -seizure - following with Dr. Bhatt.   -CRI - stage 3  -hyperlipidemia - on atorvastatin 20mg daily  -hypothyroid - on levothyroxine 50mcg daily   -IFG  -hematuria - had evaluation with urology. - cystoscopy    Patient Care Team:  -Dr. Bhatt, neurology  -Dr. Tucker, GI  -Dr. SJ Blackburn- uro-gyn    Genna Waters is a 78 y.o. female Established patient who presents today for evaluation of the following -           Assessment & Plan  1. History of CVA with dysarthria and right hand contracture and cognitive impairment.  She has completed speech therapy, physical therapy, and cognitive therapy with [facility]. She continues to do the exercises at home to help with her speech and cognitive function. Currently, her cognitive function is approximately at a second or third grade level. For her right hand contracture, she is doing exercises a couple of times a day to help minimize the tightness.    2. Seizure disorder.  She is following up with Dr. Bhatt. Per her , her dose of valproic acid was recently decreased as it was noted that her levels were elevated. He reduced the dose and she was to have follow-up labs. They would like to have it drawn today with her other blood work.    3. Depression.  It is very well controlled with sertraline 200 mg daily. She will continue the current dose.    4. Chronic renal insufficiency.  Her last BUN, creatinine, and EGFR were done in August 2024. Creatinine is stable between 1.1 and 1.2. Her EGFR in the last year has been stable between 46 and 52.    5. Hyperlipidemia.  She is on atorvastatin 20 mg daily. A fasting lipid profile will be needed at her next office visit.    6. Hypothyroid.  She is clinically euthyroid on levothyroxine 50 mcg daily. A TSH will be done today and adjusted as needed.    7. Impaired fasting glucose.  An A1c will be checked

## 2024-09-03 ENCOUNTER — OFFICE VISIT (OUTPATIENT)
Age: 79
End: 2024-09-03
Payer: MEDICARE

## 2024-09-03 VITALS
HEART RATE: 64 BPM | DIASTOLIC BLOOD PRESSURE: 58 MMHG | RESPIRATION RATE: 16 BRPM | SYSTOLIC BLOOD PRESSURE: 107 MMHG | TEMPERATURE: 96.9 F | OXYGEN SATURATION: 98 % | HEIGHT: 70 IN | WEIGHT: 151 LBS | BODY MASS INDEX: 21.62 KG/M2

## 2024-09-03 DIAGNOSIS — R56.9 SEIZURE (HCC): ICD-10-CM

## 2024-09-03 DIAGNOSIS — E78.2 MIXED HYPERLIPIDEMIA: ICD-10-CM

## 2024-09-03 DIAGNOSIS — F32.89 OTHER DEPRESSION: ICD-10-CM

## 2024-09-03 DIAGNOSIS — R73.01 IMPAIRED FASTING GLUCOSE: ICD-10-CM

## 2024-09-03 DIAGNOSIS — E03.9 HYPOTHYROIDISM, ADULT: Primary | ICD-10-CM

## 2024-09-03 DIAGNOSIS — N18.30 STAGE 3 CHRONIC KIDNEY DISEASE, UNSPECIFIED WHETHER STAGE 3A OR 3B CKD (HCC): ICD-10-CM

## 2024-09-03 DIAGNOSIS — M24.541 CONTRACTURE, RIGHT HAND: ICD-10-CM

## 2024-09-03 DIAGNOSIS — R41.89 COGNITIVE IMPAIRMENT: ICD-10-CM

## 2024-09-03 DIAGNOSIS — R31.9 HEMATURIA, UNSPECIFIED TYPE: ICD-10-CM

## 2024-09-03 DIAGNOSIS — I69.322 DYSARTHRIA FOLLOWING CEREBRAL INFARCTION: ICD-10-CM

## 2024-09-03 DIAGNOSIS — I69.359 CVA, OLD, HEMIPARESIS (HCC): ICD-10-CM

## 2024-09-03 DIAGNOSIS — E03.9 HYPOTHYROIDISM, ADULT: ICD-10-CM

## 2024-09-03 LAB
EST. AVERAGE GLUCOSE BLD GHB EST-MCNC: 103 MG/DL
HBA1C MFR BLD: 5.2 % (ref 4–5.6)
T4 FREE SERPL-MCNC: 0.8 NG/DL (ref 0.8–1.5)
TSH SERPL DL<=0.05 MIU/L-ACNC: 3.23 UIU/ML (ref 0.36–3.74)
VALPROATE SERPL-MCNC: 115 UG/ML (ref 50–100)

## 2024-09-03 PROCEDURE — 1090F PRES/ABSN URINE INCON ASSESS: CPT | Performed by: INTERNAL MEDICINE

## 2024-09-03 PROCEDURE — 99215 OFFICE O/P EST HI 40 MIN: CPT | Performed by: INTERNAL MEDICINE

## 2024-09-03 PROCEDURE — G8400 PT W/DXA NO RESULTS DOC: HCPCS | Performed by: INTERNAL MEDICINE

## 2024-09-03 PROCEDURE — 1123F ACP DISCUSS/DSCN MKR DOCD: CPT | Performed by: INTERNAL MEDICINE

## 2024-09-03 PROCEDURE — G8420 CALC BMI NORM PARAMETERS: HCPCS | Performed by: INTERNAL MEDICINE

## 2024-09-03 PROCEDURE — G8427 DOCREV CUR MEDS BY ELIG CLIN: HCPCS | Performed by: INTERNAL MEDICINE

## 2024-09-03 PROCEDURE — 1036F TOBACCO NON-USER: CPT | Performed by: INTERNAL MEDICINE

## 2024-09-03 SDOH — ECONOMIC STABILITY: FOOD INSECURITY: WITHIN THE PAST 12 MONTHS, YOU WORRIED THAT YOUR FOOD WOULD RUN OUT BEFORE YOU GOT MONEY TO BUY MORE.: NEVER TRUE

## 2024-09-03 SDOH — ECONOMIC STABILITY: FOOD INSECURITY: WITHIN THE PAST 12 MONTHS, THE FOOD YOU BOUGHT JUST DIDN'T LAST AND YOU DIDN'T HAVE MONEY TO GET MORE.: NEVER TRUE

## 2024-09-03 SDOH — ECONOMIC STABILITY: INCOME INSECURITY: HOW HARD IS IT FOR YOU TO PAY FOR THE VERY BASICS LIKE FOOD, HOUSING, MEDICAL CARE, AND HEATING?: NOT HARD AT ALL

## 2024-09-03 ASSESSMENT — PATIENT HEALTH QUESTIONNAIRE - PHQ9
2. FEELING DOWN, DEPRESSED OR HOPELESS: NOT AT ALL
1. LITTLE INTEREST OR PLEASURE IN DOING THINGS: NOT AT ALL
7. TROUBLE CONCENTRATING ON THINGS, SUCH AS READING THE NEWSPAPER OR WATCHING TELEVISION: NOT AT ALL
SUM OF ALL RESPONSES TO PHQ QUESTIONS 1-9: 0
10. IF YOU CHECKED OFF ANY PROBLEMS, HOW DIFFICULT HAVE THESE PROBLEMS MADE IT FOR YOU TO DO YOUR WORK, TAKE CARE OF THINGS AT HOME, OR GET ALONG WITH OTHER PEOPLE: NOT DIFFICULT AT ALL
4. FEELING TIRED OR HAVING LITTLE ENERGY: NOT AT ALL
5. POOR APPETITE OR OVEREATING: NOT AT ALL
9. THOUGHTS THAT YOU WOULD BE BETTER OFF DEAD, OR OF HURTING YOURSELF: NOT AT ALL
8. MOVING OR SPEAKING SO SLOWLY THAT OTHER PEOPLE COULD HAVE NOTICED. OR THE OPPOSITE, BEING SO FIGETY OR RESTLESS THAT YOU HAVE BEEN MOVING AROUND A LOT MORE THAN USUAL: NOT AT ALL
SUM OF ALL RESPONSES TO PHQ9 QUESTIONS 1 & 2: 0
3. TROUBLE FALLING OR STAYING ASLEEP: NOT AT ALL
SUM OF ALL RESPONSES TO PHQ QUESTIONS 1-9: 0
6. FEELING BAD ABOUT YOURSELF - OR THAT YOU ARE A FAILURE OR HAVE LET YOURSELF OR YOUR FAMILY DOWN: NOT AT ALL

## 2024-09-12 RX ORDER — CLOPIDOGREL BISULFATE 75 MG/1
TABLET ORAL
Qty: 90 TABLET | Refills: 1 | Status: SHIPPED | OUTPATIENT
Start: 2024-09-12

## 2024-09-18 ENCOUNTER — TELEPHONE (OUTPATIENT)
Age: 79
End: 2024-09-18

## 2024-09-29 DIAGNOSIS — F32.A DEPRESSION, UNSPECIFIED: ICD-10-CM

## 2024-09-29 RX ORDER — SERTRALINE HYDROCHLORIDE 100 MG/1
TABLET, FILM COATED ORAL
Qty: 180 TABLET | Refills: 1 | Status: SHIPPED | OUTPATIENT
Start: 2024-09-29

## 2024-10-21 RX ORDER — ATORVASTATIN CALCIUM 20 MG/1
TABLET, FILM COATED ORAL
Qty: 90 TABLET | Refills: 0 | Status: SHIPPED | OUTPATIENT
Start: 2024-10-21

## 2024-10-21 NOTE — TELEPHONE ENCOUNTER
Rx sent to pharmacy as previously filled and verified by Verbal Order Read Back with provider.    ED 09/03/2024

## 2024-11-01 RX ORDER — LEVOTHYROXINE SODIUM 50 UG/1
TABLET ORAL
Qty: 90 TABLET | Refills: 0 | Status: SHIPPED | OUTPATIENT
Start: 2024-11-01

## 2025-01-15 DIAGNOSIS — I69.359 CVA, OLD, HEMIPARESIS (HCC): ICD-10-CM

## 2025-01-15 DIAGNOSIS — G40.209 LOCALIZATION-RELATED (FOCAL) (PARTIAL) SYMPTOMATIC EPILEPSY AND EPILEPTIC SYNDROMES WITH COMPLEX PARTIAL SEIZURES, NOT INTRACTABLE, WITHOUT STATUS EPILEPTICUS (HCC): ICD-10-CM

## 2025-01-15 RX ORDER — DIVALPROEX SODIUM 125 MG/1
TABLET, DELAYED RELEASE ORAL
Qty: 180 TABLET | Refills: 1 | Status: SHIPPED | OUTPATIENT
Start: 2025-01-15

## 2025-01-15 RX ORDER — DIVALPROEX SODIUM 250 MG/1
TABLET, DELAYED RELEASE ORAL
Qty: 180 TABLET | Refills: 1 | Status: SHIPPED | OUTPATIENT
Start: 2025-01-15

## 2025-01-18 DIAGNOSIS — F32.A DEPRESSION, UNSPECIFIED: ICD-10-CM

## 2025-01-23 RX ORDER — SERTRALINE HYDROCHLORIDE 100 MG/1
TABLET, FILM COATED ORAL
Qty: 180 TABLET | Refills: 0 | Status: SHIPPED | OUTPATIENT
Start: 2025-01-23

## 2025-01-23 RX ORDER — ATORVASTATIN CALCIUM 20 MG/1
TABLET, FILM COATED ORAL
Qty: 90 TABLET | Refills: 0 | Status: SHIPPED | OUTPATIENT
Start: 2025-01-23

## 2025-01-23 NOTE — TELEPHONE ENCOUNTER
Rx sent to pharmacy as previously filled and verified by Verbal Order Read Back with provider.    NV 03/03/2025

## 2025-01-30 RX ORDER — LEVOTHYROXINE SODIUM 50 UG/1
TABLET ORAL
Qty: 90 TABLET | Refills: 0 | Status: SHIPPED | OUTPATIENT
Start: 2025-01-30

## 2025-03-02 NOTE — PROGRESS NOTES
other individuals in attendance with the patient were advised that Artificial Intelligence will be utilized during this visit to record and process the conversation to generate a clinical note. The patient (or guardian, if applicable) and other individuals in attendance at the appointment consented to the use of AI, including the recording.

## 2025-03-03 ENCOUNTER — OFFICE VISIT (OUTPATIENT)
Age: 80
End: 2025-03-03
Payer: MEDICARE

## 2025-03-03 VITALS
OXYGEN SATURATION: 99 % | TEMPERATURE: 97.8 F | WEIGHT: 158.8 LBS | RESPIRATION RATE: 16 BRPM | HEART RATE: 70 BPM | BODY MASS INDEX: 22.73 KG/M2 | HEIGHT: 70 IN | SYSTOLIC BLOOD PRESSURE: 120 MMHG | DIASTOLIC BLOOD PRESSURE: 70 MMHG

## 2025-03-03 DIAGNOSIS — G89.29 CHRONIC PAIN OF LEFT KNEE: ICD-10-CM

## 2025-03-03 DIAGNOSIS — E03.9 HYPOTHYROIDISM, ADULT: ICD-10-CM

## 2025-03-03 DIAGNOSIS — M25.562 CHRONIC PAIN OF LEFT KNEE: ICD-10-CM

## 2025-03-03 DIAGNOSIS — E78.2 MIXED HYPERLIPIDEMIA: ICD-10-CM

## 2025-03-03 DIAGNOSIS — Z79.899 ENCOUNTER FOR LONG-TERM (CURRENT) USE OF MEDICATIONS: ICD-10-CM

## 2025-03-03 DIAGNOSIS — N18.30 STAGE 3 CHRONIC KIDNEY DISEASE, UNSPECIFIED WHETHER STAGE 3A OR 3B CKD (HCC): ICD-10-CM

## 2025-03-03 DIAGNOSIS — I69.359 CVA, OLD, HEMIPARESIS (HCC): ICD-10-CM

## 2025-03-03 DIAGNOSIS — R73.01 IMPAIRED FASTING GLUCOSE: ICD-10-CM

## 2025-03-03 DIAGNOSIS — R56.9 SEIZURE (HCC): ICD-10-CM

## 2025-03-03 DIAGNOSIS — F32.A DEPRESSION, UNSPECIFIED DEPRESSION TYPE: Primary | ICD-10-CM

## 2025-03-03 PROCEDURE — 99215 OFFICE O/P EST HI 40 MIN: CPT | Performed by: INTERNAL MEDICINE

## 2025-03-03 SDOH — ECONOMIC STABILITY: FOOD INSECURITY: WITHIN THE PAST 12 MONTHS, YOU WORRIED THAT YOUR FOOD WOULD RUN OUT BEFORE YOU GOT MONEY TO BUY MORE.: NEVER TRUE

## 2025-03-03 SDOH — ECONOMIC STABILITY: FOOD INSECURITY: WITHIN THE PAST 12 MONTHS, THE FOOD YOU BOUGHT JUST DIDN'T LAST AND YOU DIDN'T HAVE MONEY TO GET MORE.: NEVER TRUE

## 2025-03-03 ASSESSMENT — PATIENT HEALTH QUESTIONNAIRE - PHQ9: DEPRESSION UNABLE TO ASSESS: PT REFUSES

## 2025-03-04 LAB
ALBUMIN SERPL-MCNC: 3.8 G/DL (ref 3.5–5)
ALBUMIN/GLOB SERPL: 1 (ref 1.1–2.2)
ALP SERPL-CCNC: 64 U/L (ref 45–117)
ALT SERPL-CCNC: 11 U/L (ref 12–78)
ANION GAP SERPL CALC-SCNC: 3 MMOL/L (ref 2–12)
AST SERPL-CCNC: 11 U/L (ref 15–37)
BASOPHILS # BLD: 0.03 K/UL (ref 0–0.1)
BASOPHILS NFR BLD: 0.6 % (ref 0–1)
BILIRUB SERPL-MCNC: 0.4 MG/DL (ref 0.2–1)
BUN SERPL-MCNC: 19 MG/DL (ref 6–20)
BUN/CREAT SERPL: 17 (ref 12–20)
CALCIUM SERPL-MCNC: 9.6 MG/DL (ref 8.5–10.1)
CHLORIDE SERPL-SCNC: 111 MMOL/L (ref 97–108)
CHOLEST SERPL-MCNC: 146 MG/DL
CO2 SERPL-SCNC: 28 MMOL/L (ref 21–32)
CREAT SERPL-MCNC: 1.1 MG/DL (ref 0.55–1.02)
DIFFERENTIAL METHOD BLD: ABNORMAL
EOSINOPHIL # BLD: 0.02 K/UL (ref 0–0.4)
EOSINOPHIL NFR BLD: 0.4 % (ref 0–7)
ERYTHROCYTE [DISTWIDTH] IN BLOOD BY AUTOMATED COUNT: 15.9 % (ref 11.5–14.5)
EST. AVERAGE GLUCOSE BLD GHB EST-MCNC: 120 MG/DL
GLOBULIN SER CALC-MCNC: 3.8 G/DL (ref 2–4)
GLUCOSE SERPL-MCNC: 77 MG/DL (ref 65–100)
HBA1C MFR BLD: 5.8 % (ref 4–5.6)
HCT VFR BLD AUTO: 37.5 % (ref 35–47)
HDLC SERPL-MCNC: 76 MG/DL
HDLC SERPL: 1.9 (ref 0–5)
HGB BLD-MCNC: 12 G/DL (ref 11.5–16)
IMM GRANULOCYTES # BLD AUTO: 0.02 K/UL (ref 0–0.04)
IMM GRANULOCYTES NFR BLD AUTO: 0.4 % (ref 0–0.5)
LDLC SERPL CALC-MCNC: 56.8 MG/DL (ref 0–100)
LYMPHOCYTES # BLD: 1.81 K/UL (ref 0.8–3.5)
LYMPHOCYTES NFR BLD: 34.5 % (ref 12–49)
MCH RBC QN AUTO: 27.5 PG (ref 26–34)
MCHC RBC AUTO-ENTMCNC: 32 G/DL (ref 30–36.5)
MCV RBC AUTO: 86 FL (ref 80–99)
MONOCYTES # BLD: 0.7 K/UL (ref 0–1)
MONOCYTES NFR BLD: 13.3 % (ref 5–13)
NEUTS SEG # BLD: 2.67 K/UL (ref 1.8–8)
NEUTS SEG NFR BLD: 50.8 % (ref 32–75)
NRBC # BLD: 0 K/UL (ref 0–0.01)
NRBC BLD-RTO: 0 PER 100 WBC
PLATELET # BLD AUTO: 135 K/UL (ref 150–400)
POTASSIUM SERPL-SCNC: 4 MMOL/L (ref 3.5–5.1)
PROT SERPL-MCNC: 7.6 G/DL (ref 6.4–8.2)
RBC # BLD AUTO: 4.36 M/UL (ref 3.8–5.2)
SODIUM SERPL-SCNC: 142 MMOL/L (ref 136–145)
T4 FREE SERPL-MCNC: 0.8 NG/DL (ref 0.8–1.5)
TRIGL SERPL-MCNC: 66 MG/DL
TSH SERPL DL<=0.05 MIU/L-ACNC: 4.25 UIU/ML (ref 0.36–3.74)
VLDLC SERPL CALC-MCNC: 13.2 MG/DL
WBC # BLD AUTO: 5.3 K/UL (ref 3.6–11)

## 2025-03-09 ENCOUNTER — RESULTS FOLLOW-UP (OUTPATIENT)
Age: 80
End: 2025-03-09

## 2025-03-26 RX ORDER — CLOPIDOGREL BISULFATE 75 MG/1
75 TABLET ORAL DAILY
Qty: 90 TABLET | Refills: 1 | Status: SHIPPED | OUTPATIENT
Start: 2025-03-26

## 2025-03-26 NOTE — TELEPHONE ENCOUNTER
Rx sent to pharmacy as previously filled and verified by Verbal Order Read Back with provider.    NV 09/25/2025

## 2025-04-30 RX ORDER — ATORVASTATIN CALCIUM 20 MG/1
TABLET, FILM COATED ORAL
Qty: 90 TABLET | Refills: 0 | Status: SHIPPED | OUTPATIENT
Start: 2025-04-30 | End: 2025-04-30 | Stop reason: SDUPTHER

## 2025-04-30 RX ORDER — ATORVASTATIN CALCIUM 20 MG/1
20 TABLET, FILM COATED ORAL DAILY
Qty: 90 TABLET | Refills: 1 | Status: SHIPPED | OUTPATIENT
Start: 2025-04-30

## 2025-05-05 RX ORDER — LEVOTHYROXINE SODIUM 50 UG/1
50 TABLET ORAL
Qty: 90 TABLET | Refills: 0 | Status: SHIPPED | OUTPATIENT
Start: 2025-05-05 | End: 2025-05-05 | Stop reason: SDUPTHER

## 2025-05-05 RX ORDER — LEVOTHYROXINE SODIUM 50 UG/1
50 TABLET ORAL
Qty: 90 TABLET | Refills: 1 | Status: SHIPPED | OUTPATIENT
Start: 2025-05-05

## 2025-05-05 NOTE — TELEPHONE ENCOUNTER
Last office visit 3/3/25  Next Appt  With Internal Medicine (Davida Elias MD)  09/25/2025 at 11:20 AM        Last fill on synthroid  1/30/25 #90 with no additional refills

## 2025-06-27 DIAGNOSIS — F32.A DEPRESSION, UNSPECIFIED: ICD-10-CM

## 2025-06-27 RX ORDER — SERTRALINE HYDROCHLORIDE 100 MG/1
200 TABLET, FILM COATED ORAL DAILY
Qty: 180 TABLET | Refills: 1 | Status: SHIPPED | OUTPATIENT
Start: 2025-06-27

## 2025-07-11 DIAGNOSIS — G40.209 LOCALIZATION-RELATED (FOCAL) (PARTIAL) SYMPTOMATIC EPILEPSY AND EPILEPTIC SYNDROMES WITH COMPLEX PARTIAL SEIZURES, NOT INTRACTABLE, WITHOUT STATUS EPILEPTICUS (HCC): ICD-10-CM

## 2025-07-11 DIAGNOSIS — I69.359 CVA, OLD, HEMIPARESIS (HCC): ICD-10-CM

## 2025-07-14 RX ORDER — DIVALPROEX SODIUM 125 MG/1
TABLET, DELAYED RELEASE ORAL
Qty: 180 TABLET | Refills: 1 | Status: SHIPPED | OUTPATIENT
Start: 2025-07-14

## 2025-07-14 RX ORDER — DIVALPROEX SODIUM 250 MG/1
TABLET, DELAYED RELEASE ORAL
Qty: 180 TABLET | Refills: 1 | Status: SHIPPED | OUTPATIENT
Start: 2025-07-14

## 2025-07-29 ENCOUNTER — OFFICE VISIT (OUTPATIENT)
Age: 80
End: 2025-07-29
Payer: MEDICARE

## 2025-07-29 VITALS
BODY MASS INDEX: 21.47 KG/M2 | DIASTOLIC BLOOD PRESSURE: 86 MMHG | WEIGHT: 150 LBS | HEART RATE: 76 BPM | SYSTOLIC BLOOD PRESSURE: 128 MMHG | OXYGEN SATURATION: 96 % | HEIGHT: 70 IN

## 2025-07-29 DIAGNOSIS — I69.359 CVA, OLD, HEMIPARESIS (HCC): Primary | ICD-10-CM

## 2025-07-29 DIAGNOSIS — G40.209 LOCALIZATION-RELATED (FOCAL) (PARTIAL) SYMPTOMATIC EPILEPSY AND EPILEPTIC SYNDROMES WITH COMPLEX PARTIAL SEIZURES, NOT INTRACTABLE, WITHOUT STATUS EPILEPTICUS (HCC): ICD-10-CM

## 2025-07-29 PROBLEM — Z86.73 HISTORY OF STROKE: Status: ACTIVE | Noted: 2017-03-10

## 2025-07-29 PROCEDURE — G8400 PT W/DXA NO RESULTS DOC: HCPCS | Performed by: PSYCHIATRY & NEUROLOGY

## 2025-07-29 PROCEDURE — 99214 OFFICE O/P EST MOD 30 MIN: CPT | Performed by: PSYCHIATRY & NEUROLOGY

## 2025-07-29 PROCEDURE — 1090F PRES/ABSN URINE INCON ASSESS: CPT | Performed by: PSYCHIATRY & NEUROLOGY

## 2025-07-29 PROCEDURE — 1123F ACP DISCUSS/DSCN MKR DOCD: CPT | Performed by: PSYCHIATRY & NEUROLOGY

## 2025-07-29 PROCEDURE — 1036F TOBACCO NON-USER: CPT | Performed by: PSYCHIATRY & NEUROLOGY

## 2025-07-29 PROCEDURE — G8420 CALC BMI NORM PARAMETERS: HCPCS | Performed by: PSYCHIATRY & NEUROLOGY

## 2025-07-29 PROCEDURE — 1159F MED LIST DOCD IN RCRD: CPT | Performed by: PSYCHIATRY & NEUROLOGY

## 2025-07-29 PROCEDURE — G8427 DOCREV CUR MEDS BY ELIG CLIN: HCPCS | Performed by: PSYCHIATRY & NEUROLOGY

## 2025-07-29 RX ORDER — SERTRALINE HYDROCHLORIDE 100 MG/1
200 TABLET, FILM COATED ORAL DAILY
Qty: 180 TABLET | Refills: 1
Start: 2025-07-29

## 2025-07-29 NOTE — PROGRESS NOTES
Chief Complaint   Patient presents with    Neurologic Problem     Epilepsy, CVA, here with , Stef.       HISTORY OF PRESENT ILLNESS  Genna ShenDanielRaad remember.  She was last seen a year ago.    Overall, she has remained stable.  There have been no further seizures.  Her last Depakote level was supratherapeutic at 115 and the dose was reduced to 375 mg twice daily.  She is overall feeling better and less tired/dizzy on this dose.  Continues to take sertraline 100 mg twice daily for depression/anxiety but she does not feel she needs this medication at this dose anymore.    She has been through physical therapy/home PT and continues to do home exercises.  She now uses iPad to exercise her brain and sometimes to communicate.  Moderate degree of expressive aphasia persists.      RECAP  She has a history of 2 strokes with residual right hemiparesis and aphasia.  For stroke was in 2014 and it was suspected to be due to thrombosis of a blood vessel, she received thrombolysis but then ended up having small bleeding complication.  She recovered quite well from it and was on seizure prophylaxis.  She was taken off of prophylactic medication and ended up having a seizure after which she was put on Depakote.  She had another stroke in 2016 that again impacted the same left side of her brain and she had more expressive aphasia and right upper extremity weakness.  She has improved but still has difficulty with word finding and struggles with fluency.    Takes Plavix and sertraline    Current Outpatient Medications   Medication Sig    sertraline (ZOLOFT) 100 MG tablet Take 2 tablets by mouth daily    divalproex (DEPAKOTE) 125 MG DR tablet TAKE 1 TABLET 2 TIMES DAILY TAKE IN ADDITION  MG TAB FOR TOTAL DOSE  MG TWICE A DAY    divalproex (DEPAKOTE) 250 MG DR tablet TAKE 1 TABLET 2 TIMES DAILY TAKE IN ADDITION  MG TAB FOR TOTAL DOSE 375 MG TWICE A DAY    levothyroxine (SYNTHROID) 50 MCG tablet Take 1

## 2025-07-29 NOTE — PROGRESS NOTES
Chief Complaint   Patient presents with    Neurologic Problem     Epilepsy, CVA, here with , Stef.     Vitals:    07/29/25 1413   BP: 128/86   Pulse: 76   SpO2: 96%

## 2025-07-30 LAB — VALPROATE SERPL-MCNC: 99 UG/ML (ref 50–100)

## 2025-07-31 ENCOUNTER — TRANSCRIBE ORDERS (OUTPATIENT)
Facility: HOSPITAL | Age: 80
End: 2025-07-31

## 2025-07-31 DIAGNOSIS — Z12.31 ENCOUNTER FOR MAMMOGRAM TO ESTABLISH BASELINE MAMMOGRAM: Primary | ICD-10-CM

## 2025-08-22 ENCOUNTER — HOSPITAL ENCOUNTER (OUTPATIENT)
Facility: HOSPITAL | Age: 80
Discharge: HOME | End: 2025-08-25
Attending: INTERNAL MEDICINE
Payer: MEDICARE

## 2025-08-22 VITALS — HEIGHT: 70 IN | BODY MASS INDEX: 21.47 KG/M2 | WEIGHT: 150 LBS

## 2025-08-22 DIAGNOSIS — Z12.31 ENCOUNTER FOR MAMMOGRAM TO ESTABLISH BASELINE MAMMOGRAM: ICD-10-CM

## 2025-08-22 PROCEDURE — 77063 BREAST TOMOSYNTHESIS BI: CPT

## (undated) DEVICE — FORCEPS BX L240CM JAW DIA2.8MM L CAP W/ NDL MIC MESH TOOTH